# Patient Record
Sex: MALE | Race: WHITE | NOT HISPANIC OR LATINO | Employment: OTHER | URBAN - METROPOLITAN AREA
[De-identification: names, ages, dates, MRNs, and addresses within clinical notes are randomized per-mention and may not be internally consistent; named-entity substitution may affect disease eponyms.]

---

## 2017-05-08 ENCOUNTER — TRANSCRIBE ORDERS (OUTPATIENT)
Dept: ADMINISTRATIVE | Facility: HOSPITAL | Age: 72
End: 2017-05-08

## 2017-05-08 ENCOUNTER — APPOINTMENT (OUTPATIENT)
Dept: LAB | Facility: HOSPITAL | Age: 72
End: 2017-05-08
Payer: MEDICARE

## 2017-05-08 DIAGNOSIS — R73.09 OTHER ABNORMAL GLUCOSE: ICD-10-CM

## 2017-05-08 LAB
EST. AVERAGE GLUCOSE BLD GHB EST-MCNC: 126 MG/DL
HBA1C MFR BLD: 6 % (ref 4.2–6.3)

## 2017-05-08 PROCEDURE — 83036 HEMOGLOBIN GLYCOSYLATED A1C: CPT

## 2017-05-08 PROCEDURE — 36415 COLL VENOUS BLD VENIPUNCTURE: CPT

## 2017-05-09 ENCOUNTER — GENERIC CONVERSION - ENCOUNTER (OUTPATIENT)
Dept: OTHER | Facility: OTHER | Age: 72
End: 2017-05-09

## 2017-05-09 LAB
LEFT EYE DIABETIC RETINOPATHY: NORMAL
RIGHT EYE DIABETIC RETINOPATHY: NORMAL

## 2017-05-19 ENCOUNTER — ALLSCRIPTS OFFICE VISIT (OUTPATIENT)
Dept: OTHER | Facility: OTHER | Age: 72
End: 2017-05-19

## 2017-06-16 ENCOUNTER — GENERIC CONVERSION - ENCOUNTER (OUTPATIENT)
Dept: OTHER | Facility: OTHER | Age: 72
End: 2017-06-16

## 2017-07-03 ENCOUNTER — ALLSCRIPTS OFFICE VISIT (OUTPATIENT)
Dept: OTHER | Facility: OTHER | Age: 72
End: 2017-07-03

## 2017-08-12 DIAGNOSIS — I10 ESSENTIAL (PRIMARY) HYPERTENSION: ICD-10-CM

## 2017-08-12 DIAGNOSIS — E78.5 HYPERLIPIDEMIA: ICD-10-CM

## 2017-08-12 DIAGNOSIS — E55.9 VITAMIN D DEFICIENCY: ICD-10-CM

## 2017-08-12 DIAGNOSIS — R73.09 OTHER ABNORMAL GLUCOSE: ICD-10-CM

## 2017-08-12 DIAGNOSIS — Z12.5 ENCOUNTER FOR SCREENING FOR MALIGNANT NEOPLASM OF PROSTATE: ICD-10-CM

## 2017-11-20 ENCOUNTER — TRANSCRIBE ORDERS (OUTPATIENT)
Dept: ADMINISTRATIVE | Facility: HOSPITAL | Age: 72
End: 2017-11-20

## 2017-11-20 ENCOUNTER — APPOINTMENT (OUTPATIENT)
Dept: LAB | Facility: HOSPITAL | Age: 72
End: 2017-11-20
Payer: MEDICARE

## 2017-11-20 DIAGNOSIS — E78.5 HYPERLIPIDEMIA: ICD-10-CM

## 2017-11-20 DIAGNOSIS — R73.09 OTHER ABNORMAL GLUCOSE: ICD-10-CM

## 2017-11-20 DIAGNOSIS — Z12.5 ENCOUNTER FOR SCREENING FOR MALIGNANT NEOPLASM OF PROSTATE: ICD-10-CM

## 2017-11-20 DIAGNOSIS — I10 ESSENTIAL (PRIMARY) HYPERTENSION: ICD-10-CM

## 2017-11-20 DIAGNOSIS — E55.9 VITAMIN D DEFICIENCY: ICD-10-CM

## 2017-11-20 LAB
25(OH)D3 SERPL-MCNC: 33 NG/ML (ref 30–100)
ALBUMIN SERPL BCP-MCNC: 3.9 G/DL (ref 3.5–5)
ALP SERPL-CCNC: 59 U/L (ref 46–116)
ALT SERPL W P-5'-P-CCNC: 43 U/L (ref 12–78)
ANION GAP SERPL CALCULATED.3IONS-SCNC: 10 MMOL/L (ref 4–13)
AST SERPL W P-5'-P-CCNC: 22 U/L (ref 5–45)
BASOPHILS # BLD AUTO: 0.02 THOUSANDS/ΜL (ref 0–0.1)
BASOPHILS NFR BLD AUTO: 0 % (ref 0–1)
BILIRUB SERPL-MCNC: 0.6 MG/DL (ref 0.2–1)
BILIRUB UR QL STRIP: NEGATIVE
BUN SERPL-MCNC: 20 MG/DL (ref 5–25)
CALCIUM SERPL-MCNC: 9.3 MG/DL (ref 8.3–10.1)
CHLORIDE SERPL-SCNC: 103 MMOL/L (ref 100–108)
CHOLEST SERPL-MCNC: 164 MG/DL (ref 50–200)
CLARITY UR: CLEAR
CO2 SERPL-SCNC: 27 MMOL/L (ref 21–32)
COLOR UR: YELLOW
CREAT SERPL-MCNC: 1.35 MG/DL (ref 0.6–1.3)
CREAT UR-MCNC: 152 MG/DL
EOSINOPHIL # BLD AUTO: 0.15 THOUSAND/ΜL (ref 0–0.61)
EOSINOPHIL NFR BLD AUTO: 2 % (ref 0–6)
ERYTHROCYTE [DISTWIDTH] IN BLOOD BY AUTOMATED COUNT: 12.9 % (ref 11.6–15.1)
EST. AVERAGE GLUCOSE BLD GHB EST-MCNC: 120 MG/DL
GFR SERPL CREATININE-BSD FRML MDRD: 52 ML/MIN/1.73SQ M
GLUCOSE P FAST SERPL-MCNC: 130 MG/DL (ref 65–99)
GLUCOSE UR STRIP-MCNC: NEGATIVE MG/DL
HBA1C MFR BLD: 5.8 % (ref 4.2–6.3)
HCT VFR BLD AUTO: 40.7 % (ref 36.5–49.3)
HDLC SERPL-MCNC: 36 MG/DL (ref 40–60)
HGB BLD-MCNC: 13.8 G/DL (ref 12–17)
HGB UR QL STRIP.AUTO: NEGATIVE
KETONES UR STRIP-MCNC: NEGATIVE MG/DL
LDLC SERPL CALC-MCNC: 89 MG/DL (ref 0–100)
LEUKOCYTE ESTERASE UR QL STRIP: NEGATIVE
LYMPHOCYTES # BLD AUTO: 1.98 THOUSANDS/ΜL (ref 0.6–4.47)
LYMPHOCYTES NFR BLD AUTO: 29 % (ref 14–44)
MCH RBC QN AUTO: 31.6 PG (ref 26.8–34.3)
MCHC RBC AUTO-ENTMCNC: 33.9 G/DL (ref 31.4–37.4)
MCV RBC AUTO: 93 FL (ref 82–98)
MICROALBUMIN UR-MCNC: 6.3 MG/L (ref 0–20)
MICROALBUMIN/CREAT 24H UR: 4 MG/G CREATININE (ref 0–30)
MONOCYTES # BLD AUTO: 0.89 THOUSAND/ΜL (ref 0.17–1.22)
MONOCYTES NFR BLD AUTO: 13 % (ref 4–12)
NEUTROPHILS # BLD AUTO: 3.83 THOUSANDS/ΜL (ref 1.85–7.62)
NEUTS SEG NFR BLD AUTO: 56 % (ref 43–75)
NITRITE UR QL STRIP: NEGATIVE
PH UR STRIP.AUTO: 5.5 [PH] (ref 4.5–8)
PLATELET # BLD AUTO: 278 THOUSANDS/UL (ref 149–390)
PMV BLD AUTO: 9.8 FL (ref 8.9–12.7)
POTASSIUM SERPL-SCNC: 5.2 MMOL/L (ref 3.5–5.3)
PROT SERPL-MCNC: 7.5 G/DL (ref 6.4–8.2)
PROT UR STRIP-MCNC: NEGATIVE MG/DL
PSA SERPL-MCNC: 0.6 NG/ML (ref 0–4)
RBC # BLD AUTO: 4.37 MILLION/UL (ref 3.88–5.62)
SODIUM SERPL-SCNC: 140 MMOL/L (ref 136–145)
SP GR UR STRIP.AUTO: 1.02 (ref 1–1.03)
TRIGL SERPL-MCNC: 196 MG/DL
TSH SERPL DL<=0.05 MIU/L-ACNC: 1.43 UIU/ML (ref 0.36–3.74)
UROBILINOGEN UR QL STRIP.AUTO: 0.2 E.U./DL
WBC # BLD AUTO: 6.87 THOUSAND/UL (ref 4.31–10.16)

## 2017-11-20 PROCEDURE — 82306 VITAMIN D 25 HYDROXY: CPT

## 2017-11-20 PROCEDURE — 85025 COMPLETE CBC W/AUTO DIFF WBC: CPT

## 2017-11-20 PROCEDURE — 81003 URINALYSIS AUTO W/O SCOPE: CPT

## 2017-11-20 PROCEDURE — G0103 PSA SCREENING: HCPCS

## 2017-11-20 PROCEDURE — 83036 HEMOGLOBIN GLYCOSYLATED A1C: CPT

## 2017-11-20 PROCEDURE — 80061 LIPID PANEL: CPT

## 2017-11-20 PROCEDURE — 36415 COLL VENOUS BLD VENIPUNCTURE: CPT

## 2017-11-20 PROCEDURE — 84443 ASSAY THYROID STIM HORMONE: CPT

## 2017-11-20 PROCEDURE — 82043 UR ALBUMIN QUANTITATIVE: CPT

## 2017-11-20 PROCEDURE — 82570 ASSAY OF URINE CREATININE: CPT

## 2017-11-20 PROCEDURE — 80053 COMPREHEN METABOLIC PANEL: CPT

## 2017-11-27 ENCOUNTER — ALLSCRIPTS OFFICE VISIT (OUTPATIENT)
Dept: OTHER | Facility: OTHER | Age: 72
End: 2017-11-27

## 2017-11-27 DIAGNOSIS — I77.811 ABDOMINAL AORTIC ECTASIA (HCC): ICD-10-CM

## 2017-11-28 NOTE — PROGRESS NOTES
Assessment    1  Encounter for preventive health examination (V70 0) (Z00 00)   2  Elevated hemoglobin A1c (790 29) (R73 09)   3  Creatinine elevation (790 99) (R79 89)   4  Ectatic abdominal aorta (447 72) (I77 811)   5  Esophageal reflux (530 81) (K21 9)   6  Hyperlipidemia (272 4) (E78 5)   7  Hypertension (401 9) (I10)   8  Vitamin D deficiency (268 9) (E55 9)    Plan  Creatinine elevation    · (1) COMPREHENSIVE METABOLIC PANEL; Status:Active; Requested for:01Apr2018;   Ectatic abdominal aorta    · US ABDOMINAL AORTA; Status:Hold For - Scheduling; Requested for:27Nov2017;   Elevated hemoglobin A1c    · (1) HEMOGLOBIN A1C; Status:Active; Requested for:01Apr2018; Health Maintenance    · Call [phone] if: You have any warning signs of skin cancer ; Status:Complete;  Done: 14XPB9781   · Seek Immediate Medical Attention if: You experience a new kind of chest pain (angina) orpressure ; Status:Complete;   Done: 63MUE1043   · Eat a low fat and low cholesterol diet ; Status:Complete;   Done: 16TCS1446   · The plan of care for falls is detailed in the plan and/or discussion section of today's note  ;Status:Complete;   Done: 12FXP4770   · There are many exercise options for seniors ; Status:Complete;   Done: 74FTA9349   · There ways to avoid falling ; Status:Complete;   Done: 57ABF9377   · These are things you can do to prevent falls in and around the home ; Status:Complete;  Done: 71NJU7448   · We recommend that you follow the Mediterranean diet ; Status:Complete;   Done:27Nov2017  Insomnia    · TraZODone HCl - 100 MG Oral Tablet;  Take one-half tablet by  mouth to 1 tabletby mouth  as needed for sleep  Elevated creatinine This is stable around 1 3-1 2 Patient is on lisinopril and we will continue the same   Elevated A1 This has improved to 5 8 from 6 0 Always seems to be around the same We will check that again in 6 months  Ectatic aorta It has been 1 year since we have checked this so we will do another ultrasound of his aorta  Trigger finger/tendinitis of his thumb Patient will follow-up with Dr Luisa Mansfield  Soft vaginal reflux Patient is using Zantac/ranitidine and basically is working  For breakthrough as he uses Prilosec   Peripheral neuropathy This is still tolerable without medications  Patient still wants to hold on gabapentin or amitriptyline  Hyperlipidemia Cholesterol values excellent with simvastatin only  Hypertension Blood pressure is good with lisinopril HCTZ    Check in 6 months with A1c microalbumin PSA vitamin D and screening blood work AWV that day  Next colonoscopy will be July 2018 Patient's last EGD was June 2013 Recheck sooner if needed   Chief Complaint  pt  presents in the office today due to a 6 month recheck for his insomina, lipids, and HTN  due - 07/2018due 0 05/2018      History of Present Illness  Patient is here for 6 month recheck  last visit he saw Dr Yari Pepe was splinted his hand for his trigger finger thumb  Has not really helped so he is going backconstant patient is a little bit better and he has cut down on his T from 10 cups a day to about 3 or 4is here today to go over all his blood worknotices that the he ranitidine is helping somewhat with his reflux but not as good is a an episode all   Sometimes he needs to substitute in Prilosec      Review of Systems   Constitutional No fever chills no fatigue no weight loss no weight gain  Mental status No anxiety or depression and no sleep disturbances no changes in personality or emotional problems no suicidal or homicidal ideations  Eyes No eye pain no red eyes no visual disturbances no discharge no eye itch  ENT No earache no hearing loss nasal discharge no sore throat no hoarseness no postnasal drip   Cardio No chest pain no palpitations no leg edema no claudication no dyspnea on exertion no nocturnal dyspnea  Respiratory No shortness of breath or wheeze no cough no orthopnea no dyspnea on exertion no hemoptysis no sputum production  GI No abdominal pain no nausea no vomiting no diarrhea or constipation no bloody stools no change in bowel habits no change in weight   no dysuria hematuria no pyuria no incontinence no pelvic pain  Musculoskeletal No myalgias arthralgias no joint swelling or stiffness no limb pain or swelling  Skin No rashes or lesions no itchiness and no wounds  Neuro No headache dizziness lightheadedness syncope numbness paresthesias or confusion  Heme No swollen glands no easy bruising  Surgical history; surgical history was reviewed and updated today  Family history; family history was reviewed and updated today  Social history; social history was reviewed and updated today  The medication list was reviewed and updated today      Active Problems  1  Actinic keratosis (702 0) (L57 0)   2  Advance directive in chart (V49 89) (Z78 9)   3  Allergic rhinitis (477 9) (J30 9)   4  Colonoscopy (Fiberoptic) Screening   5  Creatinine elevation (790 99) (R79 89)   6  De Quervain's tenosynovitis, right (727 04) (M65 4)   7  Diverticulosis (562 10) (K57 90)   8  Ectatic abdominal aorta (447 72) (I77 811)   9  Elastosis of skin (701 8) (L98 8)   10  Elevated hemoglobin A1c (790 29) (R73 09)   11  Esophageal reflux (530 81) (K21 9)   12  Hiatal hernia (553 3) (K44 9)   13  Hyperlipidemia (272 4) (E78 5)   14  Hypertension (401 9) (I10)   15  Insomnia (780 52) (G47 00)   16  Long term use of drug (V58 69) (Z79 899)   17  Special screening examination for neoplasm of prostate (V76 44) (Z12 5)   18  Tinnitus, unspecified laterality (388 30) (H93 19)   19  Trigger middle finger of right hand (727 03) (M65 331)   20  Vitamin D deficiency (268 9) (E55 9)    Past Medical History  1  History of Abnormal glucose (790 29) (R73 09)   2  History of Anemia (285 9) (D64 9)   3  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)   4  History of Chondromalacia of patella, 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)   5   History of Ganglion (727 43) (M67 40)   6  History of Glaucoma Screening   7  History of Glucose Intolerance (271 3)   8  History of abdominal pain (V13 89) (Z87 898)   9  History of diverticulitis of colon (V12 79) (Z87 19)   10  History of gastroenteritis (V12 79) (Z87 19)   11  History of Hyperesthesia (782 0) (R20 3)   12  History of Joint pain, knee (719 46) (M25 569)   13  History of Rectal hemorrhage (569 3) (K62 5)    Surgical History  1  History of Adenoidectomy   2  History of Incisional Breast Biopsy   3  History of Neck Surgery   4  History of Tonsillectomy    Family History  Mother    1  Family history of Mother  At Age 80  Father    2  Family history of Father  At Age 80    Social History     · Advance directive in chart (V49 89) (Z78 9)   · Always uses seat belt   · Being A Social Drinker   · Copy of advanced directive obtained (V49 89) (Z78 9)   · Drinks caffeinated tea   · Former smoker (V15 82) (T94 452)   · No drug use    Current Meds   1  Aspirin 81 MG TABS; TAKE 1 TABLET DAILY; Therapy: 34YCD3581 to (Evaluate:04Nsx4328); Last Rx:22Fks1953 Ordered   2  Randy Contour Next Test In [employer]; TEST or diabetes, 250, twice a day; Therapy: 02HDO5941 to (Last BS:53JXK2539)  Requested for: 99IBF1209 Ordered   3  Centrum TABS; TAKE 1 TABLET DAILY; Therapy: (Rendall Face) to Recorded   4  Equate Allergy-Sinus Headache TABS; TAKE 6 TABLET Daily; Therapy: (Rendall Face) to Recorded   5  HydroCHLOROthiazide 25 MG Oral Tablet; Take one-half tablet by  mouth daily; Therapy: 22ZFS8388 to (Evaluate:2018)  Requested for: 98LOB1833; Last Rx:28Ven0828 Ordered   6  Lisinopril 40 MG Oral Tablet; Take 1 tablet by mouth  daily; Therapy: 88NWM6180 to (Evaluate:2018)  Requested for: 41OLG0365; Last Rx:87Jry1000 Ordered   7  Microlet Lancets Miscellaneous; Use to test blood sugar for diabetes, 250, twice a day; Therapy: 61YNC2537 to (Last YO:31MJO6666)  Requested for: 64YIE6272 Ordered   8   RaNITidine HCl - 300 MG Oral Tablet; one tablet twice a day; Therapy: 01EBD2435 to (Last Rx:30Jun2017)  Requested for: 30Jun2017 Ordered   9  Simvastatin 5 MG Oral Tablet; TAKE 1 TABLET BY MOUTH AT  BEDTIME; Therapy: 21Haq0497 to (Evaluate:01Jan2018)  Requested for: 81FNS4436; Last Rx:01Frz8347 Ordered   10  TraZODone HCl - 100 MG Oral Tablet; Take one-half tablet by  mouth to 1 tablet by mouth   as needed for sleep; Therapy: 63Ukj6043 to (Evaluate:14Dec2017)  Requested for: 72YAW7836; Last  Rx:02Gco1909 Ordered   11  Vitamin C TABS; TAKE 1 TABLET DAILY; Therapy: (Omar Woodard) to Recorded   12  Vitamin E TABS; TAKE 1 TABLET DAILY; Therapy: (Recorded:26Mar2013) to Recorded    Allergies  1  Iodine SOLN  2  No Known Environmental Allergies   3  No Known Food Allergies    Vitals  Vital Signs    Recorded: 88UPZ9588 08:58AM   Heart Rate 80   Respiration 14   Systolic 708   Diastolic 72   Height 5 ft 5 in   Weight 181 lb    BMI Calculated 30 12   BSA Calculated 1 9       Physical Exam   Gen   No acute distress well-appearing well-nourished appears stated age  Mental status Good judgment and insight oriented to time person and place, recent and remote memory intact mood and affect normal cooperative and patient is reasonable  HEENT PERRLA 3 mm, EOMI without nystagmus, TMs clear, turbinates open pink no exudate, pharynx benign, tongue midline  Neck  supple no masses trachea midline positive click normal carotid upstrokes with no bruits  Cor Regular rhythm without ectopy or murmur, no S3-S4, normal palpation that is no heave lift or thrill  Vascular No edema, good pedal pulses  Lungs CTA bilaterally in no respiratory distress no wheezes rhonchi or rales, normal to palpation no tactile fremitus  Abdomen Soft, no palpable masses, no hepatosplenomegaly, normal bowel sounds, nontender  Lymphatics No palpable nodes in the neck, supraclavicular area, axilla, or groin   Musculoskeletal No clubbing cyanosis or edema muscle tone normal  Skin no rashes or abnormal appearing lesions  Neuro Normal ambulation, cranial nerves 2-12 grossly intact, higher functioning with reasoning intact  Results/Data  (1) LIPID PANEL, FASTING 20Nov2017 09:08AM Viry Beckham    Order Number: [id number]     Test Name Result Flag Reference   CHOLESTEROL 164 mg/dL     HDL,DIRECT 36 mg/dL L 40-60   Specimen collection should occur prior to Metamizole administration due to the potential for falsley depressed results  LDL CHOLESTEROL CALCULATED 89 mg/dL  0-100     Triglyceride:       Normal <150 mg/dl  Borderline High 150-199 mg/dl  High 200-499 mg/dl  Very High >499 mg/dl   Cholesterol:      Desirable <200 mg/dl   Borderline High 200-239 mg/dl   High >239 mg/dl   HDL Cholesterol:      High>59 mg/dL   Low <41 mg/dL   This screening LDL is a calculated result  It does not have the accuracy of the Direct Measured LDL in the monitoring of patients with hyperlipidemia and/or statin therapy  Direct Measure LDL (OKL086) must be ordered separately in these patients  TRIGLYCERIDES 196 mg/dL H <=150   Specimen collection should occur prior to N-Acetylcysteine or Metamizole administration due to the potential for falsely depressed results  (1) CBC/PLT/DIFF 46ZTR8429 09:08AM Viry Beckham    Order Number: [id number]     Test Name Result Flag Reference   WBC COUNT 6 87 Thousand/uL  4 31-10 16   RBC COUNT 4 37 Million/uL  3 88-5 62   HEMOGLOBIN 13 8 g/dL  12 0-17 0   HEMATOCRIT 40 7 %  36 5-49 3   MCV 93 fL  82-98   MCH 31 6 pg  26 8-34 3   MCHC 33 9 g/dL  31 4-37 4   RDW 12 9 %  11 6-15 1   MPV 9 8 fL  8 9-12 7   PLATELET COUNT 754 Thousands/uL  149-390   NEUTROPHILS RELATIVE PERCENT 56 %  43-75   LYMPHOCYTES RELATIVE PERCENT 29 %  14-44   MONOCYTES RELATIVE PERCENT 13 % H 4-12   EOSINOPHILS RELATIVE PERCENT 2 %  0-6   BASOPHILS RELATIVE PERCENT 0 %  0-1   NEUTROPHILS ABSOLUTE COUNT 3 83 Thousands/? ??L  1 85-7 62   LYMPHOCYTES ABSOLUTE COUNT 1 98 Thousands/? ??L  0 60-4 47   MONOCYTES ABSOLUTE COUNT 0 89 Thousand/? ??L  0 17-1 22   EOSINOPHILS ABSOLUTE COUNT 0 15 Thousand/? ??L  0 00-0 61   BASOPHILS ABSOLUTE COUNT 0 02 Thousands/? ??L  0 00-0 10     (1) COMPREHENSIVE METABOLIC PANEL 94KBA5870 33:51TM Leana Trotter   TW Order Number: [id number]     Test Name Result Flag Reference   SODIUM 140 mmol/L  136-145   POTASSIUM 5 2 mmol/L  3 5-5 3   CHLORIDE 103 mmol/L  100-108   CARBON DIOXIDE 27 mmol/L  21-32   ANION GAP (CALC) 10 mmol/L  4-13   BLOOD UREA NITROGEN 20 mg/dL  5-25   CREATININE 1 35 mg/dL H 0 60-1 30   Standardized to IDMS reference method   CALCIUM 9 3 mg/dL  8 3-10 1   BILI, TOTAL 0 60 mg/dL  0 20-1 00   ALK PHOSPHATAS 59 U/L     ALT (SGPT) 43 U/L  12-78   Specimen collection should occur prior to Sulfasalazine administration due to the potential for falsely depressed results  AST(SGOT) 22 U/L  5-45   Specimen collection should occur prior to Sulfasalazine administration due to the potential for falsely depressed results  ALBUMIN 3 9 g/dL  3 5-5 0   TOTAL PROTEIN 7 5 g/dL  6 4-8 2   eGFR 52 ml/min/1 73sq m       [facility] Disease Education Program recommendations are as follows: GFR calculation is accurate only with a steady state creatinine Chronic Kidney disease less than 60 ml/min/1 73 sq  meters Kidney failure less than 15 ml/min/1 73 sq  meters  GLUCOSE FASTING 130 mg/dL H 65-99   Specimen collection should occur prior to Sulfasalazine administration due to the potential for falsely depressed results  Specimen collection should occur prior to Sulfapyridine administration due to the potential for falsely elevated results  (1) TSH WITH FT4 REFLEX 94FWJ6729 09:08AM Leana Trotter    Order Number: [id number]     Test Name Result Flag Reference   TSH 1 430 uIU/mL  0 358-3 740   Patients undergoing fluorescein dye angiography may retain small amounts of fluorescein in the body for 48-72 hours post procedure  Samples containing fluorescein can produce falsely depressed TSH values  If the patient had this procedure,a specimen should be resubmitted post fluorescein clearance  (1) URINALYSIS (will reflex a microscopy if leukocytes, occult blood, protein or nitrites are not within normal limits) 11DAN2589 09:08AM Al[facility] Rosa Isela    Order Number: [id number]     Test Name Result Flag Reference   COLOR Yellow     CLARITY Clear     SPECIFIC GRAVITY UA 1 025  1 003-1 030   PH UA 5 5  4 5-8 0   LEUKOCYTE ESTERASE UA Negative  Negative   NITRITE UA Negative  Negative   PROTEIN UA Negative mg/dl  Negative   GLUCOSE UA Negative mg/dl  Negative   KETONES UA Negative mg/dl  Negative   UROBILINOGEN UA 0 2 E U /dl  0 2, 1 0 E U /dl   BILIRUBIN UA Negative  Negative   BLOOD UA Negative  Negative     (1) VITAMIN D 25-HYDROXY 93WJD5496 09:08AM [facility]    Order Number: [id number]     Test Name Result Flag Reference   VIT D 25-HYDROX 33 0 ng/mL  30 0-100 0     This assay is a certified procedure of the CDC Vitamin D Standardization Certification Program (VDSCP)   Deficiency <20ng/ml  Insufficiency 20-30ng/ml  Sufficient  ng/ml   *Patients undergoing fluorescein dye angiography may retain small amounts of fluorescein in the body for 48-72 hours post procedure  Samples containing fluorescein can produce falsely elevated Vitamin D values  If the patient had this procedure, a specimen should be resubmitted post fluorescein clearance  (1) PSA (SCREEN) (Dx V76 44 Screen for Prostate Cancer) 20Nov2017 09:08AM [facility]    Order Number: [id number]     Test Name Result Flag Reference   PROSTATE SPECIFIC ANTIGEN 0 6 ng/mL  0 0-4 0   American Urological Association Guidelines define biochemical recurrence of prostate cancer as a detectable or rising PSA value post-radical prostatectomy that is greater than or equal to 0 2 ng/mL with a second confirmatory level of greater than or equal to 0 2 ng/mL  (1) HEMOGLOBIN A1C 35STY8446 09:08AM [facility] [place] Order Number: [id number]     Test Name Result Flag Reference   HEMOGLOBIN A1C 5 8 %  4 2-6 3   EST  AVG  GLUCOSE 120 mg/dl       (1) MICROALBUMIN CREATININE RATIO, RANDOM URINE 20Nov2017 09:08AM [facility] [place] Order Number: [id number]     Test Name Result Flag Reference   MICROALBUMIN/ CREAT R 4 mg/g creatinine  0-30   MICROALBUMIN,URINE 6 3 mg/L  0 0-20 0   CREATININE URINE 152 0 mg/dL         Health Management  Colonoscopy (Fiberoptic) Screening   COLONOSCOPY; every 5 years; Last 96QLD4102; Next Due: 17WGH4764; Active  Hypertension   EKG/ECG- POC; every 1 year; Last 35OKX1590; Next Due: 72IAV8351; Active  Health Maintenance   Medicare Annual Wellness Visit; every 1 year; Last 21YCX6955; Next Due: 83ZWP7941;  Overdue    Signatures   Electronically signed by : Ellyn Johansen DO; Nov 27 2017  9:46AM EST                       (Author)

## 2017-12-01 ENCOUNTER — HOSPITAL ENCOUNTER (OUTPATIENT)
Dept: ULTRASOUND IMAGING | Facility: HOSPITAL | Age: 72
Discharge: HOME | End: 2017-12-01
Payer: MEDICARE

## 2017-12-01 DIAGNOSIS — I77.811 ABDOMINAL AORTIC ECTASIA (HCC): ICD-10-CM

## 2017-12-01 PROCEDURE — 76775 US EXAM ABDO BACK WALL LIM: CPT

## 2018-01-11 NOTE — PROGRESS NOTES
Assessment   1  Encounter for preventive health examination (V70 0) (Z00 00)    Plan  Health Maintenance    · Call [phone] if: You have any warning signs of skin cancer ; Status:Complete;    Done: 71VGH9750   · Seek Immediate Medical Attention if: You experience a new kind of chest pain (angina)  or pressure ; Status:Complete;   Done: 60UAP6182   · Eat a low fat and low cholesterol diet ; Status:Complete;   Done: 67IQL4045   · The plan of care for falls is detailed in the plan and/or discussion section of today's note ;  Status:Complete;   Done: 89NDT1704   · There are many exercise options for seniors ; Status:Complete;   Done: 29KTB0201   · There ways to avoid falling ; Status:Complete;   Done: 20HYK7501   · These are things you can do to prevent falls in and around the home ; Status:Complete;    Done: 40RLR0237   · We recommend that you follow the "Mediterranean diet "; Status:Complete;   Done:  86FMY5499    Patient is here for his annual Medicare recheck review the following is a summary    #1 his colonoscopy is due July 2018  #2 patient will get another ultrasound of his aorta since his last one was ectatic at 2 3 cm      Discussion/Summary    Patient's AAA is negative  He will be informed1        Impression: Subsequent Annual Wellness Visit, with preventive exam as well as age and risk appropriate counseling completed  Cardiovascular screening and counseling: screening is current  Diabetes screening and counseling: screening is current  Colorectal cancer screening and counseling: screening is current  Prostate cancer screening and counseling: screening is current  Osteoporosis screening and counseling: screening not indicated  Abdominal aortic aneurysm screening and counseling: screening is current  HIV screening and counseling: screening not indicated  Hepatitis C Screening: the patient was counseled on Hepatitis C screening  The patient declinesHepatitis C screening   Immunizations: influenza vaccine is up to date this year, influenza vaccination is recommended annually, the lifetime pneumococcal vaccine has been completed, hepatitis B vaccination series is not indicated at this time due to the patient's low risk of scott the disease, Zostavax vaccination up to date and Tdap vaccination up to date  Advance Directive Planning: complete and up to date  Advice and education were given regarding fall risk reduction  (Basement railing) (None needed) Medical Equipment/Suppliers: None Needed  Patient Discussion: plan discussed with the patient, follow-up visit needed in one year  1 Amended By: Falguni Chambers; Dec 01 2017 3:11 PM EST    Chief Complaint  Pt here for an AWV appt  History of Present Illness  Welcome to Medicare and Wellness Visits: The patient is being seen for the subsequent annual wellness visit  Medicare Screening and Risk Factors   Hospitalizations: no previous hospitalizations  Once per lifetime medicare screening tests: ECG ~U(), AAA screening US ~U() and Slightly ectatic  Medicare Screening Tests Risk Questions   Abdominal aortic aneurysm risk assessment: over 72years of age  Osteoporosis risk assessment: none indicated  HIV risk assessment: none indicated  Drug and Alcohol Use: The patient is a former cigarette smoker and Patient stop when he was 28years old  The patient reports occasional alcohol use and drinking 2 drinks per week  Alcohol concern:   The patient has no concerns about alcohol abuse  He has never used illicit drugs  Diet and Physical Activity: Current diet includes well balanced meals and limited junk food  He exercises 2 times per week  Exercise: walking, stretching, strength training 15-60 minutes per day  Mood Disorder and Cognitive Impairment Screening: He denies feeling down, depressed, or hopeless over the past two weeks  He denies feeling little interest or pleasure in doing things over the past two weeks     Cognitive impairment screening: denies difficulty learning/retaining new information, denies difficulty handling complex tasks, denies difficulty with reasoning, denies difficulty with spatial ability and orientation, denies difficulty with language and denies difficulty with behavior  Advance Directives: Advance directives: living will  end of life decisions were reviewed with the patient and I agree with the patient's decisions  Co-Managers and Medical Equipment/Suppliers: See Patient Care Team       Reviewed Updated Tarun Valentine:   Last Medicare Wellness Visit Information was reviewed, patient interviewed and updates made to the record today  Patient Care Team    Care Team Member Role Specialty Office Number   Hansa Ho   [phone]     Active Problems   1  Actinic keratosis (702 0) (L57 0)  2  Advance directive in chart (V49 89) (Z78 9)  3  Allergic rhinitis (477 9) (J30 9)  4  Colonoscopy (Fiberoptic) Screening  5  Creatinine elevation (790 99) (R79 89)  6  De Quervain's tenosynovitis, right (727 04) (M65 4)  7  Diverticulosis (562 10) (K57 90)  8  Ectatic abdominal aorta (447 72) (I77 811)  9  Elastosis of skin (701 8) (L98 8)  10  Elevated hemoglobin A1c (790 29) (R73 09)  11  Encounter for screening for cardiovascular disorders (V81 2) (Z13 6)  12  Encounter for therapeutic drug monitoring (V58 83) (Z51 81)  13  Esophageal reflux (530 81) (K21 9)  14  Hiatal hernia (553 3) (K44 9)  15  Hyperlipidemia (272 4) (E78 5)  16  Hypertension (401 9) (I10)  17  Insomnia (780 52) (G47 00)  18  Long term use of drug (V58 69) (Z79 899)  19  Need for immunization against influenza (V04 81) (Z23)  20  Need for prophylactic vaccination and inoculation against influenza (V04 81) (Z23)  21  Special screening examination for neoplasm of prostate (V76 44) (Z12 5)  22  Tinnitus, unspecified laterality (388 30) (H93 19)  23  Trigger middle finger of right hand (727 03) (M65 331)  24   Vitamin D deficiency (268 9) (E55 9)    Past Medical History    · History of Abnormal glucose (790 29) (R73 09)   · History of Anemia (285 9) (D64 9)   · 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)   · History of Chondromalacia of patella, 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)   · History of Ganglion (727 43) (M67 40)   · History of Glaucoma Screening   · History of Glucose Intolerance (271 3)   · History of abdominal pain (V13 89) (W74 175)   · History of diverticulitis of colon (V12 79) (Z87 19)   · History of gastroenteritis (V12 79) (Z87 19)   · History of Hyperesthesia (782 0) (R20 3)   · History of Joint pain, knee (719 46) (M25 569)   · Need for prophylactic vaccination and inoculation against influenza (V04 81) (Z23)   · History of Rectal hemorrhage (569 3) (K62 5)    Surgical History    · History of Adenoidectomy   · History of Incisional Breast Biopsy   · History of Neck Surgery   · History of Tonsillectomy    Family History  Mother    · Family history of Mother  At Age 80  Father    · Family history of Father  At Age 80    Social History    · Advance directive in chart (V49 89) (Z78 9)   · Always uses seat belt   · Being A Social Drinker   · Copy of advanced directive obtained (V49 89) (Z78 9)   · Drinks caffeinated tea   · Drinks 4-6 cups of hot tea a day   · Former smoker (V15 82) (K03 458)   · quit in 0523-0616   · No drug use    Current Meds  1  Aspirin 81 MG TABS; TAKE 1 TABLET DAILY; Therapy: 26MWK2700 to (Evaluate:49Zka1332); Last Rx:30Ivz7565 Ordered  2  Randy Contour Next Test In [employer]; TEST or diabetes, 250, twice a day; Therapy: 71JZR8045 to (Last TV:88BER5922)  Requested for: 59JVY5401 Ordered  3  Centrum TABS; TAKE 1 TABLET DAILY; Therapy: (Nori Livingston) to Recorded  4  Equate Allergy-Sinus Headache TABS; TAKE 6 TABLET Daily; Therapy: (Nori Livingston) to Recorded  5  HydroCHLOROthiazide 25 MG Oral Tablet; Take one-half tablet by  mouth daily;    Therapy: 22SRY3882 to (Evaluate:01Jan2018)  Requested for: 34MIQ7391; Last   Rx:05Hqk2498 Ordered  6  Lisinopril 40 MG Oral Tablet; Take 1 tablet by mouth  daily; Therapy: 03WXI4851 to (Evaluate:01Jan2018)  Requested for: 73AFE4215; Last   Rx:17Lta6722 Ordered  7  Loratadine 10 MG Oral Tablet; TAKE 1 TABLET DAILY AS NEEDED; Therapy: (Recorded:40Zkd3378) to Recorded  8  Microlet Lancets Miscellaneous; Use to test blood sugar for diabetes, 250, twice a day; Therapy: 58VQW5533 to (Last MCGEE:94OMN2171)  Requested for: 95EHV1756 Ordered  9  RaNITidine HCl - 300 MG Oral Tablet; one tablet twice a day; Therapy: 93WNB0742 to (Last Rx:30Jun2017)  Requested for: 30Jun2017 Ordered  10  Simvastatin 5 MG Oral Tablet; TAKE 1 TABLET BY MOUTH AT  BEDTIME; Therapy: 53Trg4070 to (Evaluate:01Jan2018)  Requested for: 52PJS1566; Last    Rx:72Lnz5657 Ordered  11  TraZODone HCl - 100 MG Oral Tablet; Take one-half tablet by  mouth to 1 tablet by    mouth  as needed for sleep; Therapy: 16Utm3934 to (Evaluate:03Gtu3402)  Requested for: 14SWL9974; Last    Rx:56Hjv9576 Ordered  12  Vitamin C TABS; TAKE 1 TABLET DAILY; Therapy: (Yana Jasso) to Recorded  13  Vitamin E TABS; TAKE 1 TABLET DAILY; Therapy: (Recorded:26Mar2013) to Recorded    Allergies   1  Iodine SOLN   2  No Known Environmental Allergies  3  No Known Food Allergies    Immunizations   ** Printed in Appendix #1 below  Vitals  Signs    Heart Rate: 80  Respiration: 14  Systolic: 220  Diastolic: 72  Height: 5 ft 5 in  Weight: 181 lb   BMI Calculated: 30 12  BSA Calculated: 1 9    Results/Data   ABDOMINAL AORTA1 [id number] 07:52AM1 [facility] TW Order Number: [id number]   Performing Comments: This is a 1 year recheck of patient's ectatic aorta   - Patient Instructions: To schedule this appointment, please contact Central Scheduling at [phone]       Test Name Result Flag Reference   [address] (Report)1     ABDOMINAL AORTIC ULTRASOUND     INDICATION: Evaluate for aortic aneurysm  COMPARISON: 2016     FINDINGS:      Ultrasound of the abdominal aorta was performed in longitudinal and transverse planes with a curvilinear transducer  Proximal aorta: 2 7 x 2 5 cm   Mid aorta:    Not well seen due to overlying bowel gas  Distal aorta:  1 7 x 1 7 cm   Right common iliac origin: 1 2 x 1 1 cm   Left common iliac origin: 1 2 x 0 9 cm     No periaortic collections or adenopathy detected  IMPRESSION:     No evidence for abdominal aortic aneurysm  Workstation performed: [id number]     Signed by:   Jose Chan MD   17         1  Amended By: Pamela Cabrera; Dec 01 2017 3:11 PM EST Health Management  Colonoscopy (Fiberoptic) Screening   COLONOSCOPY; every 5 years; Last 15NRJ3756; Next Due: 82QRA5429; Active  Hypertension   EKG/ECG- POC; every 1 year; Last 45IYU1374; Next Due: 34PXI6936; Active  Health Maintenance   Medicare Annual Wellness Visit; every 1 year; Last 79YZY1426; Next Due: 25AQJ1690;   Overdue    Signatures   Electronically signed by : Rick Delgado DO; Dec  1 2017  3:11PM EST                       (Author)    Appendix #1     Patient: Stephanie Hubbard ; : 1945; MRN: [id number]      1 2 3 4 5 6    DTP/DTaP  28-Aug-2006  (60y)         HIB  28-Aug-2006  (60y)         Influenza  2011  (65y) 21-Sep-2012  (66y) 06-Sep-2013  (67y) 15-Sep-2014  (40K) 28-Sep-2015  (69y) Oct 2016  (70y)    Lyme  2001  (55y)         PCV  2016  (70y)         PPSV  2007  (62y) 2012  (96W)        Polio  28-Aug-2006  (60y)         Tdap  2003  (57y)         Zoster  27-Sep-2012  (66y)

## 2018-01-12 VITALS
RESPIRATION RATE: 16 BRPM | BODY MASS INDEX: 31 KG/M2 | HEART RATE: 84 BPM | DIASTOLIC BLOOD PRESSURE: 68 MMHG | HEIGHT: 65 IN | SYSTOLIC BLOOD PRESSURE: 122 MMHG | WEIGHT: 186.1 LBS

## 2018-01-13 VITALS
HEIGHT: 65 IN | SYSTOLIC BLOOD PRESSURE: 110 MMHG | BODY MASS INDEX: 30.16 KG/M2 | WEIGHT: 181 LBS | DIASTOLIC BLOOD PRESSURE: 72 MMHG | RESPIRATION RATE: 14 BRPM | HEART RATE: 80 BPM

## 2018-01-13 VITALS
WEIGHT: 186.5 LBS | DIASTOLIC BLOOD PRESSURE: 61 MMHG | HEIGHT: 65 IN | HEART RATE: 82 BPM | SYSTOLIC BLOOD PRESSURE: 94 MMHG | BODY MASS INDEX: 31.07 KG/M2

## 2018-01-13 NOTE — MISCELLANEOUS
Message   Recorded as Task   Date: 06/15/2017 12:06 PM, Created By: Yandel Hoskins   Task Name: Medical Complaint Callback   Assigned To: Nitesh Sommer   Regarding Patient: Андрей Fitzpatrick, Status: Active   CommentHollace Rias - 15 Salty 2017 12:06 PM     TASK CREATED  Byard called and said that he is now up to 2 pills of the Famotidine every day and it still isn't helping with his acid reflux  Wants to know what to do  [phone] and uses Subhash Pin - 15 Salty 2017 5:32 PM     TASK REPLIED TO: Previously Assigned To Nitesh Sommer  New medication called Zantac/ranitidine 300 mg twice a day as been called to his local pharmacy  Have him try this in place of the famotidine and see how he does  He should give it at least 2 weeks   Tatiana Mcdowell - 16 Jun 2017 7:33 AM     TASK REPLIED TO: Previously Assigned To Tatiana Mcdowell  That's great, I'll call him but I had written that he wanted the RX to go to [facility] for 90 days even though it's a new RX  [id number] - 16 Jun 2017 10:29 AM     TASK REPLIED TO: Previously Assigned To Nitesh Sommer  No problem, I sent it to [facility]  The only problem is, if it doesn't work he will have a ton of medicine laying around   Yandel Hoskins - 16 Jun 2017 12:15 PM     TASK REPLIED TO: Previously Assigned To [facility] the info          Signatures   Electronically signed by : Aftab Rice DO; Jun 16 2017 12:22PM EST                       (Author)

## 2018-02-07 DIAGNOSIS — K20.90 ESOPHAGITIS: Primary | ICD-10-CM

## 2018-02-07 PROBLEM — M65.4 DE QUERVAIN'S TENOSYNOVITIS, RIGHT: Status: ACTIVE | Noted: 2017-07-03

## 2018-02-07 PROBLEM — M65.331 TRIGGER MIDDLE FINGER OF RIGHT HAND: Status: ACTIVE | Noted: 2017-05-19

## 2018-02-07 RX ORDER — LANCETS
EACH MISCELLANEOUS
COMMUNITY
Start: 2014-08-04 | End: 2018-07-10 | Stop reason: SDUPTHER

## 2018-02-07 RX ORDER — RIBOFLAVIN (VITAMIN B2) 100 MG
1 TABLET ORAL DAILY
COMMUNITY

## 2018-02-07 RX ORDER — TRAZODONE HYDROCHLORIDE 100 MG/1
TABLET ORAL
COMMUNITY
Start: 2017-11-26 | End: 2019-01-03 | Stop reason: SDUPTHER

## 2018-02-07 RX ORDER — LISINOPRIL 40 MG/1
1 TABLET ORAL DAILY
COMMUNITY
Start: 2016-12-19 | End: 2018-06-19 | Stop reason: SDUPTHER

## 2018-02-07 RX ORDER — RANITIDINE 300 MG/1
1 TABLET ORAL 2 TIMES DAILY
COMMUNITY
Start: 2017-06-20 | End: 2018-05-29 | Stop reason: ALTCHOICE

## 2018-02-07 RX ORDER — HYDROCHLOROTHIAZIDE 25 MG/1
0.5 TABLET ORAL DAILY
COMMUNITY
Start: 2016-12-19 | End: 2018-06-19 | Stop reason: SDUPTHER

## 2018-02-07 RX ORDER — ASPIRIN 81 MG/1
1 TABLET, CHEWABLE ORAL DAILY
COMMUNITY
Start: 2015-08-18

## 2018-02-07 RX ORDER — SIMVASTATIN 5 MG
1 TABLET ORAL
COMMUNITY
Start: 2014-09-15 | End: 2018-06-19 | Stop reason: SDUPTHER

## 2018-02-07 RX ORDER — MULTIVITAMIN/IRON/FOLIC ACID 18MG-0.4MG
1 TABLET ORAL DAILY
COMMUNITY

## 2018-02-07 RX ORDER — CYANOCOBALAMIN (VITAMIN B-12) 500 MCG
1 LOZENGE ORAL DAILY
COMMUNITY
End: 2019-06-11 | Stop reason: ALTCHOICE

## 2018-02-07 RX ORDER — OMEPRAZOLE 40 MG/1
CAPSULE, DELAYED RELEASE ORAL
Qty: 90 CAPSULE | Refills: 3 | Status: SHIPPED | OUTPATIENT
Start: 2018-02-07 | End: 2019-01-03 | Stop reason: SDUPTHER

## 2018-04-01 DIAGNOSIS — R73.09 OTHER ABNORMAL GLUCOSE: ICD-10-CM

## 2018-04-01 DIAGNOSIS — R79.89 OTHER SPECIFIED ABNORMAL FINDINGS OF BLOOD CHEMISTRY: ICD-10-CM

## 2018-05-16 ENCOUNTER — OFFICE VISIT (OUTPATIENT)
Dept: URGENT CARE | Facility: CLINIC | Age: 73
End: 2018-05-16
Payer: MEDICARE

## 2018-05-16 VITALS
OXYGEN SATURATION: 96 % | SYSTOLIC BLOOD PRESSURE: 114 MMHG | WEIGHT: 188 LBS | BODY MASS INDEX: 31.32 KG/M2 | DIASTOLIC BLOOD PRESSURE: 72 MMHG | RESPIRATION RATE: 16 BRPM | HEIGHT: 65 IN | HEART RATE: 75 BPM | TEMPERATURE: 97.8 F

## 2018-05-16 DIAGNOSIS — H61.23 BILATERAL IMPACTED CERUMEN: Primary | ICD-10-CM

## 2018-05-16 PROCEDURE — 99213 OFFICE O/P EST LOW 20 MIN: CPT | Performed by: PHYSICIAN ASSISTANT

## 2018-05-16 PROCEDURE — G0463 HOSPITAL OUTPT CLINIC VISIT: HCPCS | Performed by: PHYSICIAN ASSISTANT

## 2018-05-16 NOTE — PATIENT INSTRUCTIONS
Ear lavage attempted in both ears- successful in the right ear- unable to move cerumen in the left ear     Follow up with ENT for cerumen removal in the right ear  Take OTC ibuprofen for discomfort

## 2018-05-16 NOTE — PROGRESS NOTES
NAME: Deloris Angeles is a 67 y o  male  : 1945    MRN: [id number]      Assessment and Plan   Bilateral impacted cerumen [H61 23]  1  Bilateral impacted cerumen  Ear cerumen removal       Ear lavage performed in clinic  Successful removal of cerumen in right ear  unsuccessful removal from left ear after several attempts  Patient Instructions   Patient Instructions   Ear lavage attempted in both ears- successful in the right ear- unable to move cerumen in the left ear  Follow up with ENT for cerumen removal in the right ear  Take OTC ibuprofen for discomfort      Proceed to ER if symptoms worsen  History of Present Illness     Patient presents complaining of ear fullness and pain in both ears  He states he got new hearing aids which go into the canal and when they were placed his audiologist said he had a lot of wax  He denies fevers, chills, congestion, sore throat or cough  He reports decreased hearing in both ears but L>R  He attempted to wash out his ears but had no success  Review of Systems   Review of Systems   Constitutional: Negative for chills and fever  HENT: Positive for ear pain  Respiratory: Negative for cough            Current Medications       Current Outpatient Prescriptions:     Ascorbic Acid (VITAMIN C) 100 MG tablet, Take 1 tablet by mouth daily, Disp: , Rfl:     aspirin 81 mg chewable tablet, Chew 1 tablet daily, Disp: , Rfl:     glucose blood (WESLY CONTOUR NEXT TEST) test strip, by In Vitro route, Disp: , Rfl:     hydrochlorothiazide (HYDRODIURIL) 25 mg tablet, Take 0 5 tablets by mouth daily, Disp: , Rfl:     lisinopril (ZESTRIL) 40 mg tablet, Take 1 tablet by mouth daily, Disp: , Rfl:     MICROLET LANCETS MISC, by Does not apply route, Disp: , Rfl:     multivitamin-minerals (CENTRUM ADULTS) tablet, Take 1 tablet by mouth daily, Disp: , Rfl:     omeprazole (PriLOSEC) 40 MG capsule, TAKE 1 CAPSULE BY MOUTH  DAILY, Disp: 90 capsule, Rfl: 3    ranitidine (ZANTAC) 300 MG tablet, Take 1 tablet by mouth 2 (two) times a day, Disp: , Rfl:     simvastatin (ZOCOR) 5 MG tablet, Take 1 tablet by mouth, Disp: , Rfl:     traZODone (DESYREL) 100 mg tablet, , Disp: , Rfl:     Vitamin E 400 units TABS, Take 1 tablet by mouth daily, Disp: , Rfl:     Diphenhydramine-PSE-APAP 12 5- MG TABS, Take 6 tablets by mouth daily, Disp: , Rfl:     Current Allergies     Allergies as of 05/16/2018 - Reviewed 05/16/2018   Allergen Reaction Noted    Iodine  04/09/2012              Past Medical History:   Diagnosis Date    Abnormal glucose     last assessed 63Lui7363    Anemia     last assessed 07Sjk2611    Benign neoplasm of descending colon 12/01/2009    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    unspecified laterality    Diverticulitis of colon 02/22/2011    Ganglion     last assessed 32CUI6381    Gastroenteritis     last assessed 28RQL4165    Glucose intolerance     last assessed 65Gjg6167    Hyperesthesia 11/30/2010    Rectal hemorrhage 06/01/2010       Past Surgical History:   Procedure Laterality Date    ADENOIDECTOMY  01/01/1950 1950    INCISIONAL BREAST BIOPSY Left 01/01/1980    L breast did bx 1980    NECK SURGERY  01/01/1950    neck gland removed as child [address]   01/01/1950 1950       Family History   Problem Relation Age of Onset    Alzheimer's disease Mother     Heart attack Father          Medications have been verified  The following portions of the patient's history were reviewed and updated as appropriate: allergies, current medications, past family history, past medical history, past social history, past surgical history and problem list     Objective   /72   Pulse 75   Temp 97 8 °F (36 6 °C)   Resp 16   Ht 5' 5" (1 651 m)   Wt 85 3 kg (188 lb)   SpO2 96%   BMI 31 28 kg/m²      Physical Exam     Physical Exam   Constitutional: He appears well-developed and well-nourished  No distress     HENT:   TMs not able to be visualized due to cerumen impaction b/l  Canals without erythema       Right TM after cerumen removal- TM clear without bulging, erythema or perforation

## 2018-05-21 ENCOUNTER — APPOINTMENT (OUTPATIENT)
Dept: LAB | Facility: HOSPITAL | Age: 73
End: 2018-05-21
Payer: MEDICARE

## 2018-05-21 DIAGNOSIS — R79.89 OTHER SPECIFIED ABNORMAL FINDINGS OF BLOOD CHEMISTRY: ICD-10-CM

## 2018-05-21 DIAGNOSIS — R73.09 OTHER ABNORMAL GLUCOSE: ICD-10-CM

## 2018-05-21 LAB
ALBUMIN SERPL BCP-MCNC: 3.8 G/DL (ref 3.5–5)
ALP SERPL-CCNC: 66 U/L (ref 46–116)
ALT SERPL W P-5'-P-CCNC: 30 U/L (ref 12–78)
ANION GAP SERPL CALCULATED.3IONS-SCNC: 8 MMOL/L (ref 4–13)
AST SERPL W P-5'-P-CCNC: 21 U/L (ref 5–45)
BILIRUB SERPL-MCNC: 0.5 MG/DL (ref 0.2–1)
BUN SERPL-MCNC: 23 MG/DL (ref 5–25)
CALCIUM SERPL-MCNC: 9.1 MG/DL (ref 8.3–10.1)
CHLORIDE SERPL-SCNC: 104 MMOL/L (ref 100–108)
CO2 SERPL-SCNC: 26 MMOL/L (ref 21–32)
CREAT SERPL-MCNC: 1.22 MG/DL (ref 0.6–1.3)
EST. AVERAGE GLUCOSE BLD GHB EST-MCNC: 126 MG/DL
GFR SERPL CREATININE-BSD FRML MDRD: 59 ML/MIN/1.73SQ M
GLUCOSE SERPL-MCNC: 156 MG/DL (ref 65–140)
HBA1C MFR BLD: 6 % (ref 4.2–6.3)
POTASSIUM SERPL-SCNC: 3.9 MMOL/L (ref 3.5–5.3)
PROT SERPL-MCNC: 7.5 G/DL (ref 6.4–8.2)
SODIUM SERPL-SCNC: 138 MMOL/L (ref 136–145)

## 2018-05-21 PROCEDURE — 80053 COMPREHEN METABOLIC PANEL: CPT

## 2018-05-21 PROCEDURE — 36415 COLL VENOUS BLD VENIPUNCTURE: CPT

## 2018-05-21 PROCEDURE — 83036 HEMOGLOBIN GLYCOSYLATED A1C: CPT

## 2018-05-24 NOTE — PROGRESS NOTES
ASSESSMENT/PLAN:    Elevated creatinine   Patient is on lisinopril and we will continue the same  Creatinine is stable at 1 22 usually between 1 2 and 1 35    Elevated A1   A1c 6 0 from 5 8 and stable  Always seems to be around the same   We will check that again in 6 months      Ectatic aorta  This year's CAT scan says he has no aortic aneurysm so we will stop following this  Esophageal reflux   Patient is using Zantac/ranitidine and basically is working  For breakthrough as he uses Prilosec       Peripheral neuropathy   This is still tolerable without medications  Patient still wants to hold on gabapentin or amitriptyline      Hyperlipidemia   Cholesterol values excellent with simvastatin only      Hypertension   Blood pressure is good with lisinopril HCTZ          Check in 6 months with A1c microalbumin PSA vitamin D and screening blood work   AWV that day    Next colonoscopy will be July 2018 Patient's last EGD was June 2013    Order given for colonoscopy   Recheck sooner if needed      Health Maintenance   Topic Date Due    Hepatitis C Screening  1945    SLP PLAN OF CARE  1945    GLAUCOMA SCREENING 67+ YR  02/17/2016    DTaP,Tdap,and Td Vaccines (3 - Td) 08/28/2016    Depression Screening PHQ-9  05/19/2018    COLONOSCOPY  07/16/2018    INFLUENZA VACCINE  09/01/2018    Annual Wellnes Visit (AWV)  11/27/2018    Fall Risk  05/29/2019    ABDOMINAL AORTIC ANEURYSM (AAA) SCREEN  Completed    PNEUMOCOCCAL POLYSACCHARIDE VACCINE AGE 72 AND OVER  Completed         Problem List as of 5/29/2018 Reviewed: 5/16/2018 11:46 AM by Oliver Allan PA-C    Actinic keratosis    Allergic rhinitis    Colon cancer screening    Creatinine elevation    De Quervain's tenosynovitis, right    Diverticulosis    Ectatic abdominal aorta (HCC)    Elastosis of skin    Elevated hemoglobin A1c    Esophageal reflux    Hiatal hernia    Hyperlipidemia    Hypertension    Insomnia    Tinnitus    Trigger middle finger of right hand    Vitamin D deficiency            Subjective:   Chief Complaint   Patient presents with    Follow-up     HTN, elevated A1C, reflux, lipids and Vit D def     Patient is here for 6 month recheck  His only complaint is sometimes he has pain in his legs when he goes to bed but does not keep him from falling asleep  Sometimes he is sore because he has worked too much and sometimes because he has not done anything at all  Overall he feels well and is compliant with his medications  Since last visit he had his ultrasound of his aorta done, he had blood work done as well         patient ID: Elsie Ann is a 67 y o  male      Past Medical History:   Diagnosis Date    Abnormal glucose     last assessed [id number]    Anemia     last assessed [id number]    Benign neoplasm of descending colon 12/01/2009    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    unspecified laterality    Diverticulitis of colon 02/22/2011    Ganglion     last assessed [id number]    Gastroenteritis     last assessed [id number]    Glucose intolerance     last assessed [id number]    Hyperesthesia 11/30/2010    Rectal hemorrhage 06/01/2010     Past Surgical History:   Procedure Laterality Date    ADENOIDECTOMY  01/01/1950 1950    INCISIONAL BREAST BIOPSY Left 01/01/1980    L breast did bx 1980    NECK SURGERY  01/01/1950    neck gland removed as child [address]   01/01/1950 1950     Family History   Problem Relation Age of Onset    Alzheimer's disease Mother     Heart attack Father      Social History   Substance Use Topics    Smoking status: Former Smoker     Quit date: 1980    Smokeless tobacco: Never Used      Comment: quit in [id number]    Alcohol use Yes      Comment: social     History   Smoking Status    Former Smoker    Quit date: 1980   Smokeless Tobacco    Never Used     Comment: quit in [id number]        MED LIST WAS REVIEWED AND UPDATED       ROS    Constitutional  No fever chills no fatigue no weight loss no weight gain    Mental status  No anxiety or depression and no sleep disturbances no changes in personality or emotional problems no suicidal or homicidal ideations    Eyes  No eye pain no red eyes no visual disturbances no discharge no eye itch    ENT  No earache no hearing loss nasal discharge no sore throat no hoarseness no postnasal drip     Cardio  No chest pain no palpitations no leg edema no claudication no dyspnea on exertion no nocturnal dyspnea    Respiratory  No shortness of breath or wheeze no cough no orthopnea no dyspnea on exertion no hemoptysis no sputum production    GI  No abdominal pain no nausea no vomiting no diarrhea or constipation no bloody stools no change in bowel habits no change in weight      no dysuria hematuria no pyuria no incontinence no pelvic pain    Musculoskeletal  No myalgias arthralgias no joint swelling or stiffness no limb pain or swelling    Skin  No rashes or lesions no itchiness and no wounds    Neuro  No headache dizziness lightheadedness syncope numbness paresthesias or confusion    Heme  No swollen glands no easy bruising          Objective:      VITALS:  Wt Readings from Last 3 Encounters:   05/29/18 83 3 kg (183 lb 11 2 oz)   05/16/18 85 3 kg (188 lb)   11/27/17 82 1 kg (181 lb)     BP Readings from Last 3 Encounters:   05/29/18 122/78   05/16/18 114/72   11/27/17 110/72     Pulse Readings from Last 3 Encounters:   05/29/18 72   05/16/18 75   11/27/17 80     Body mass index is 30 34 kg/m²      Laboratory Results:   Lab Results   Component Value Date    WBC 6 87 11/20/2017    WBC 8 12 08/11/2016    WBC 7 24 08/10/2015    HGB 13 8 11/20/2017    HGB 13 4 08/11/2016    HGB 14 0 08/10/2015    HCT 40 7 11/20/2017    HCT 39 5 08/11/2016    HCT 40 2 08/10/2015    MCV 93 11/20/2017    MCV 92 08/11/2016    MCV 91 08/10/2015     11/20/2017     08/11/2016     08/10/2015     Lab Results   Component Value Date     05/21/2018     11/20/2017    NA 142 11/07/2016    K 3 9 05/21/2018    K 5 2 11/20/2017    K 4 3 11/07/2016     05/21/2018     11/20/2017     11/07/2016    CO2 26 05/21/2018    CO2 27 11/20/2017    CO2 29 11/07/2016    BUN 23 05/21/2018    BUN 20 11/20/2017    BUN 19 11/07/2016     Lab Results   Component Value Date    GLUCOSE 156 (H) 05/21/2018    ALT 30 05/21/2018    AST 21 05/21/2018    ALKPHOS 66 05/21/2018    EGFR 59 05/21/2018    CALCIUM 9 1 05/21/2018     No results found for: TSH    Lipid Profile:   Lab Results   Component Value Date    CHOL 164 11/20/2017    CHOL 136 08/11/2016    CHOL 125 08/10/2015     Lab Results   Component Value Date    HDL 36 (L) 11/20/2017    HDL 34 (L) 08/11/2016    HDL 36 08/10/2015     Lab Results   Component Value Date    LDLCALC 89 11/20/2017    LDLCALC 60 08/11/2016    LDLCALC 64 08/10/2015     Lab Results   Component Value Date    TRIG 196 (H) 11/20/2017    TRIG 211 (H) 08/11/2016    TRIG 127 08/10/2015       Diabetic labs (if applicable)  Lab Results   Component Value Date    HGBA1C 6 0 05/21/2018    HGBA1C 5 8 11/20/2017    HGBA1C 6 0 05/08/2017     Lab Results   Component Value Date    GLUF 130 (H) 11/20/2017    LDLCALC 89 11/20/2017    CREATININE 1 22 05/21/2018          Physical Exam  Constitutional  Appears healthy, Looks well, Appearance consistent with age    Mental Status  Alert, Oriented, Cooperative, Memory function normal , clean, and reasonable    Neck  No neck mass, No thyromegaly, Good carotid upstrokes bilaterally, trachea midline positive click    Respiratory  Breath sounds normal, No rales, No rhonchi, No wheezing, normal palpation    Cardiac   Regular rhythm without ectopy or murmur no S3-S4, no heave lift or thrill to palpation    Vascular  No leg edema, No pedal edema    Muscular skeletal  No clubbing cyanosis , muscle tone normal    Skin  No appreciable rashes or abnormal appearing lesions

## 2018-05-29 ENCOUNTER — OFFICE VISIT (OUTPATIENT)
Dept: FAMILY MEDICINE CLINIC | Facility: CLINIC | Age: 73
End: 2018-05-29
Payer: MEDICARE

## 2018-05-29 VITALS
SYSTOLIC BLOOD PRESSURE: 122 MMHG | HEIGHT: 65 IN | DIASTOLIC BLOOD PRESSURE: 78 MMHG | BODY MASS INDEX: 30.6 KG/M2 | RESPIRATION RATE: 16 BRPM | HEART RATE: 72 BPM | WEIGHT: 183.7 LBS

## 2018-05-29 DIAGNOSIS — R79.89 CREATININE ELEVATION: ICD-10-CM

## 2018-05-29 DIAGNOSIS — E55.9 VITAMIN D DEFICIENCY: ICD-10-CM

## 2018-05-29 DIAGNOSIS — K21.9 GASTROESOPHAGEAL REFLUX DISEASE, ESOPHAGITIS PRESENCE NOT SPECIFIED: ICD-10-CM

## 2018-05-29 DIAGNOSIS — J30.9 ALLERGIC RHINITIS, UNSPECIFIED SEASONALITY, UNSPECIFIED TRIGGER: ICD-10-CM

## 2018-05-29 DIAGNOSIS — I10 HYPERTENSION, UNSPECIFIED TYPE: Primary | ICD-10-CM

## 2018-05-29 DIAGNOSIS — R73.09 ELEVATED HEMOGLOBIN A1C: ICD-10-CM

## 2018-05-29 DIAGNOSIS — E78.2 MIXED HYPERLIPIDEMIA: ICD-10-CM

## 2018-05-29 DIAGNOSIS — Z12.11 COLON CANCER SCREENING: ICD-10-CM

## 2018-05-29 DIAGNOSIS — Z12.11 ENCOUNTER FOR SCREENING COLONOSCOPY: ICD-10-CM

## 2018-05-29 DIAGNOSIS — I77.811 ECTATIC ABDOMINAL AORTA (HCC): ICD-10-CM

## 2018-05-29 DIAGNOSIS — G47.00 INSOMNIA, UNSPECIFIED TYPE: ICD-10-CM

## 2018-05-29 DIAGNOSIS — Z12.5 PROSTATE CANCER SCREENING: ICD-10-CM

## 2018-05-29 PROCEDURE — 93000 ELECTROCARDIOGRAM COMPLETE: CPT | Performed by: FAMILY MEDICINE

## 2018-05-29 PROCEDURE — 99214 OFFICE O/P EST MOD 30 MIN: CPT | Performed by: FAMILY MEDICINE

## 2018-05-29 NOTE — PATIENT INSTRUCTIONS
Today I saw you for your recheck in the following is her list of things to do:    1  He will get a colonoscopy around June so call GI today and make that arrangement  Phone numbers on the paper I gave you  2  Make sure he get blood work about a week before next office visit  3    All your medications are the same

## 2018-06-19 DIAGNOSIS — I10 ESSENTIAL HYPERTENSION: Primary | ICD-10-CM

## 2018-06-19 DIAGNOSIS — E78.2 MIXED HYPERLIPIDEMIA: ICD-10-CM

## 2018-06-19 RX ORDER — LISINOPRIL 40 MG/1
TABLET ORAL DAILY
Qty: 90 TABLET | Refills: 1 | Status: SHIPPED | OUTPATIENT
Start: 2018-06-19 | End: 2019-01-03 | Stop reason: SDUPTHER

## 2018-06-19 RX ORDER — HYDROCHLOROTHIAZIDE 25 MG/1
TABLET ORAL
Qty: 45 TABLET | Refills: 1 | Status: SHIPPED | OUTPATIENT
Start: 2018-06-19 | End: 2019-01-03 | Stop reason: SDUPTHER

## 2018-06-19 RX ORDER — SIMVASTATIN 5 MG
TABLET ORAL
Qty: 90 TABLET | Refills: 1 | Status: SHIPPED | OUTPATIENT
Start: 2018-06-19 | End: 2019-01-03 | Stop reason: SDUPTHER

## 2018-07-10 ENCOUNTER — TELEPHONE (OUTPATIENT)
Dept: FAMILY MEDICINE CLINIC | Facility: CLINIC | Age: 73
End: 2018-07-10

## 2018-07-10 DIAGNOSIS — R73.9 HYPERGLYCEMIA: Primary | ICD-10-CM

## 2018-07-10 RX ORDER — LANCETS
EACH MISCELLANEOUS
Qty: 100 EACH | Refills: 3 | Status: SHIPPED | OUTPATIENT
Start: 2018-07-10 | End: 2019-09-20 | Stop reason: SDUPTHER

## 2018-07-24 NOTE — TELEPHONE ENCOUNTER
Patient called  He still cannot get his Lancets  I called his Pharmacy  The [facility] is faxing over a DWO form to be filled out (Medicare form)  I will keep a look out for it on [employer]

## 2018-09-21 ENCOUNTER — CLINICAL SUPPORT (OUTPATIENT)
Dept: FAMILY MEDICINE CLINIC | Facility: CLINIC | Age: 73
End: 2018-09-21
Payer: MEDICARE

## 2018-09-21 VITALS — WEIGHT: 183.6 LBS | BODY MASS INDEX: 30.32 KG/M2

## 2018-09-21 DIAGNOSIS — Z23 NEED FOR PROPHYLACTIC VACCINATION AND INOCULATION AGAINST INFLUENZA: Primary | ICD-10-CM

## 2018-09-21 PROCEDURE — 90662 IIV NO PRSV INCREASED AG IM: CPT

## 2018-09-21 PROCEDURE — G0008 ADMIN INFLUENZA VIRUS VAC: HCPCS

## 2018-11-19 ENCOUNTER — TRANSCRIBE ORDERS (OUTPATIENT)
Dept: ADMINISTRATIVE | Facility: HOSPITAL | Age: 73
End: 2018-11-19

## 2018-11-19 ENCOUNTER — APPOINTMENT (OUTPATIENT)
Dept: LAB | Facility: HOSPITAL | Age: 73
End: 2018-11-19
Payer: MEDICARE

## 2018-11-19 DIAGNOSIS — E55.9 VITAMIN D DEFICIENCY: ICD-10-CM

## 2018-11-19 DIAGNOSIS — R73.09 ELEVATED HEMOGLOBIN A1C: ICD-10-CM

## 2018-11-19 DIAGNOSIS — E78.2 MIXED HYPERLIPIDEMIA: Primary | ICD-10-CM

## 2018-11-19 DIAGNOSIS — Z12.5 PROSTATE CANCER SCREENING: ICD-10-CM

## 2018-11-19 DIAGNOSIS — I10 HYPERTENSION, UNSPECIFIED TYPE: ICD-10-CM

## 2018-11-19 DIAGNOSIS — E78.2 MIXED HYPERLIPIDEMIA: ICD-10-CM

## 2018-11-19 LAB
25(OH)D3 SERPL-MCNC: 36 NG/ML (ref 30–100)
ALBUMIN SERPL BCP-MCNC: 3.9 G/DL (ref 3.5–5)
ALP SERPL-CCNC: 62 U/L (ref 46–116)
ALT SERPL W P-5'-P-CCNC: 33 U/L (ref 12–78)
ANION GAP SERPL CALCULATED.3IONS-SCNC: 11 MMOL/L (ref 4–13)
AST SERPL W P-5'-P-CCNC: 19 U/L (ref 5–45)
BACTERIA UR QL AUTO: ABNORMAL /HPF
BASOPHILS # BLD AUTO: 0.04 THOUSANDS/ΜL (ref 0–0.1)
BASOPHILS NFR BLD AUTO: 1 % (ref 0–1)
BILIRUB SERPL-MCNC: 0.5 MG/DL (ref 0.2–1)
BILIRUB UR QL STRIP: NEGATIVE
BUN SERPL-MCNC: 20 MG/DL (ref 5–25)
CALCIUM SERPL-MCNC: 9.2 MG/DL (ref 8.3–10.1)
CHLORIDE SERPL-SCNC: 103 MMOL/L (ref 100–108)
CHOLEST SERPL-MCNC: 156 MG/DL (ref 50–200)
CLARITY UR: CLEAR
CO2 SERPL-SCNC: 26 MMOL/L (ref 21–32)
COLOR UR: YELLOW
CREAT SERPL-MCNC: 1.39 MG/DL (ref 0.6–1.3)
CREAT UR-MCNC: 290 MG/DL
EOSINOPHIL # BLD AUTO: 0.28 THOUSAND/ΜL (ref 0–0.61)
EOSINOPHIL NFR BLD AUTO: 4 % (ref 0–6)
ERYTHROCYTE [DISTWIDTH] IN BLOOD BY AUTOMATED COUNT: 12.7 % (ref 11.6–15.1)
GFR SERPL CREATININE-BSD FRML MDRD: 50 ML/MIN/1.73SQ M
GLUCOSE P FAST SERPL-MCNC: 136 MG/DL (ref 65–99)
GLUCOSE UR STRIP-MCNC: NEGATIVE MG/DL
HCT VFR BLD AUTO: 38.8 % (ref 36.5–49.3)
HDLC SERPL-MCNC: 32 MG/DL (ref 40–60)
HGB BLD-MCNC: 12.9 G/DL (ref 12–17)
HGB UR QL STRIP.AUTO: NEGATIVE
IMM GRANULOCYTES # BLD AUTO: 0.01 THOUSAND/UL (ref 0–0.2)
IMM GRANULOCYTES NFR BLD AUTO: 0 % (ref 0–2)
KETONES UR STRIP-MCNC: NEGATIVE MG/DL
LDLC SERPL CALC-MCNC: 84 MG/DL (ref 0–100)
LEUKOCYTE ESTERASE UR QL STRIP: NEGATIVE
LYMPHOCYTES # BLD AUTO: 1.82 THOUSANDS/ΜL (ref 0.6–4.47)
LYMPHOCYTES NFR BLD AUTO: 26 % (ref 14–44)
MCH RBC QN AUTO: 31.4 PG (ref 26.8–34.3)
MCHC RBC AUTO-ENTMCNC: 33.2 G/DL (ref 31.4–37.4)
MCV RBC AUTO: 94 FL (ref 82–98)
MICROALBUMIN UR-MCNC: 22 MG/L (ref 0–20)
MICROALBUMIN/CREAT 24H UR: 8 MG/G CREATININE (ref 0–30)
MONOCYTES # BLD AUTO: 0.85 THOUSAND/ΜL (ref 0.17–1.22)
MONOCYTES NFR BLD AUTO: 12 % (ref 4–12)
MUCOUS THREADS UR QL AUTO: ABNORMAL
NEUTROPHILS # BLD AUTO: 3.91 THOUSANDS/ΜL (ref 1.85–7.62)
NEUTS SEG NFR BLD AUTO: 57 % (ref 43–75)
NITRITE UR QL STRIP: NEGATIVE
NON-SQ EPI CELLS URNS QL MICRO: ABNORMAL /HPF
NONHDLC SERPL-MCNC: 124 MG/DL
NRBC BLD AUTO-RTO: 0 /100 WBCS
PH UR STRIP.AUTO: 5.5 [PH] (ref 4.5–8)
PLATELET # BLD AUTO: 264 THOUSANDS/UL (ref 149–390)
PMV BLD AUTO: 10.4 FL (ref 8.9–12.7)
POTASSIUM SERPL-SCNC: 4.2 MMOL/L (ref 3.5–5.3)
PROT SERPL-MCNC: 7.6 G/DL (ref 6.4–8.2)
PROT UR STRIP-MCNC: NEGATIVE MG/DL
PSA SERPL-MCNC: 0.7 NG/ML (ref 0–4)
RBC # BLD AUTO: 4.11 MILLION/UL (ref 3.88–5.62)
RBC #/AREA URNS AUTO: ABNORMAL /HPF
SODIUM SERPL-SCNC: 140 MMOL/L (ref 136–145)
SP GR UR STRIP.AUTO: >=1.03 (ref 1–1.03)
TRIGL SERPL-MCNC: 202 MG/DL
TSH SERPL DL<=0.05 MIU/L-ACNC: 1.22 UIU/ML (ref 0.36–3.74)
UROBILINOGEN UR QL STRIP.AUTO: 0.2 E.U./DL
WBC # BLD AUTO: 6.91 THOUSAND/UL (ref 4.31–10.16)
WBC #/AREA URNS AUTO: ABNORMAL /HPF

## 2018-11-19 PROCEDURE — G0103 PSA SCREENING: HCPCS

## 2018-11-19 PROCEDURE — 81001 URINALYSIS AUTO W/SCOPE: CPT | Performed by: FAMILY MEDICINE

## 2018-11-19 PROCEDURE — 80053 COMPREHEN METABOLIC PANEL: CPT

## 2018-11-19 PROCEDURE — 82570 ASSAY OF URINE CREATININE: CPT | Performed by: FAMILY MEDICINE

## 2018-11-19 PROCEDURE — 83036 HEMOGLOBIN GLYCOSYLATED A1C: CPT

## 2018-11-19 PROCEDURE — 84443 ASSAY THYROID STIM HORMONE: CPT

## 2018-11-19 PROCEDURE — 82306 VITAMIN D 25 HYDROXY: CPT

## 2018-11-19 PROCEDURE — 36415 COLL VENOUS BLD VENIPUNCTURE: CPT

## 2018-11-19 PROCEDURE — 85025 COMPLETE CBC W/AUTO DIFF WBC: CPT

## 2018-11-19 PROCEDURE — 82043 UR ALBUMIN QUANTITATIVE: CPT | Performed by: FAMILY MEDICINE

## 2018-11-19 PROCEDURE — 80061 LIPID PANEL: CPT

## 2018-11-20 LAB
EST. AVERAGE GLUCOSE BLD GHB EST-MCNC: 148 MG/DL
HBA1C MFR BLD: 6.8 % (ref 4.2–6.3)

## 2019-01-03 DIAGNOSIS — G47.00 INSOMNIA, UNSPECIFIED TYPE: Primary | ICD-10-CM

## 2019-01-03 DIAGNOSIS — I10 ESSENTIAL HYPERTENSION: ICD-10-CM

## 2019-01-03 DIAGNOSIS — E78.2 MIXED HYPERLIPIDEMIA: ICD-10-CM

## 2019-01-03 DIAGNOSIS — K20.90 ESOPHAGITIS: ICD-10-CM

## 2019-01-03 RX ORDER — TRAZODONE HYDROCHLORIDE 100 MG/1
TABLET ORAL
Qty: 90 TABLET | Refills: 1 | Status: SHIPPED | OUTPATIENT
Start: 2019-01-03 | End: 2019-07-29 | Stop reason: SDUPTHER

## 2019-01-03 RX ORDER — LISINOPRIL 40 MG/1
TABLET ORAL DAILY
Qty: 90 TABLET | Refills: 1 | Status: SHIPPED | OUTPATIENT
Start: 2019-01-03 | End: 2019-07-29 | Stop reason: SDUPTHER

## 2019-01-03 RX ORDER — SIMVASTATIN 5 MG
TABLET ORAL
Qty: 90 TABLET | Refills: 1 | Status: SHIPPED | OUTPATIENT
Start: 2019-01-03 | End: 2019-07-29 | Stop reason: SDUPTHER

## 2019-01-03 RX ORDER — HYDROCHLOROTHIAZIDE 25 MG/1
TABLET ORAL
Qty: 45 TABLET | Refills: 1 | Status: SHIPPED | OUTPATIENT
Start: 2019-01-03 | End: 2019-07-29 | Stop reason: SDUPTHER

## 2019-01-03 RX ORDER — OMEPRAZOLE 40 MG/1
CAPSULE, DELAYED RELEASE ORAL
Qty: 90 CAPSULE | Refills: 1 | Status: SHIPPED | OUTPATIENT
Start: 2019-01-03 | End: 2019-07-29 | Stop reason: SDUPTHER

## 2019-01-29 LAB
LEFT EYE DIABETIC RETINOPATHY: NORMAL
RIGHT EYE DIABETIC RETINOPATHY: NORMAL

## 2019-02-24 NOTE — PROGRESS NOTES
ASSESSMENT/PLAN:    Type 2 diabetes  Patient's diabetes is now diagnosable because his A1c was 6 8 November, were checking an office A1c today  We will go back to holding in on his diet cutting down bread pasta sweets and candies  We will check him in 3 months with A1c prior    Diabetic nephropathy  We are starting to see very early signs of kidney disease as results of his diabetes  For the 1st time he has protein in his urine  He is on lisinopril for this  Hopefully if his A1c comes down this will also improve    Elevated creatinine  Today was 1 39, was 1 22, and prior 1 35  Now we understand this is clearly from the diabetes  Again we will keep this under control to try to stop any further kidney damage     Ectatic aorta  This year's CAT scan says he has no aortic aneurysm so we will stop following this        Esophageal reflux   Patient is using Zantac/ranitidine and basically is working  For breakthrough as he uses omeprazole      Peripheral neuropathy   This is still tolerable without medications     Patient still wants to hold on gabapentin or amitriptyline       Hyperlipidemia   Cholesterol values excellent with simvastatin only       Hypertension   Blood pressure is good with lisinopril HCTZ           Recheck in 3 months  A1c and a urine for protein prior  Recheck sooner if needed         Health Maintenance   Topic Date Due    Hepatitis C Screening  1945    SLP PLAN OF CARE  1945    BMI: Followup Plan  10/08/1963    DTaP,Tdap,and Td Vaccines (3 - Td) 08/28/2016    CRC Screening: Colonoscopy  07/16/2018    Medicare Annual Wellness Visit (AWV)  11/27/2018    Fall Risk  03/01/2020    Depression Screening PHQ  03/01/2020    BMI: Adult  03/01/2020    INFLUENZA VACCINE  Completed    Pneumococcal PPSV23/PCV13 65+ Years / High and Highest Risk  Completed    HEPATITIS B VACCINES  Aged Out         Problem List as of 3/1/2019 Reviewed: 5/29/2018  9:03 AM by DO Lebron Nava keratosis    Allergic rhinitis    Colon cancer screening    Creatinine elevation    De Quervain's tenosynovitis, right    Diverticulosis    Ectatic abdominal aorta (HCC)    Elastosis of skin    Elevated hemoglobin A1c    Esophageal reflux    Hiatal hernia    Hyperlipidemia    Hypertension    Insomnia    Tinnitus    Trigger middle finger of right hand    Vitamin D deficiency            Subjective:   No chief complaint on file  Patient is here for his recheck on his blood pressure as well as his labs  Overall he feels well without any complaints  He is taking his medications as directed        patient ID: Paddy Burleson is a 68 y o  male      Past Medical History:   Diagnosis Date    Abnormal glucose     last assessed 2012    Anemia     last assessed [id number]    Benign neoplasm of descending colon 2009    Chondromalacia of patella 2011    unspecified laterality    Diverticulitis of colon 2011    Ganglion     last assessed [id number]    Gastroenteritis     last assessed [id number]    Glucose intolerance     last assessed [id number]    Hyperesthesia 2010    Rectal hemorrhage 2010     Past Surgical History:   Procedure Laterality Date    ADENOIDECTOMY  1950    INCISIONAL BREAST BIOPSY Left 1980    L breast did bx     NECK SURGERY  1950    neck gland removed as child [address]   1950     Family History   Problem Relation Age of Onset    Alzheimer's disease Mother     Heart attack Father     Alcohol abuse Neg Hx     Substance Abuse Neg Hx      Social History     Tobacco Use    Smoking status: Former Smoker     Last attempt to quit:      Years since quittin 1    Smokeless tobacco: Never Used    Tobacco comment: quit in [id number]   Substance Use Topics    Alcohol use: Yes     Comment: social    Drug use: No     Social History     Tobacco Use   Smoking Status Former Smoker    Last attempt to quit: Laisha Garzon 39 Years since quittin 1   Smokeless Tobacco Never Used   Tobacco Comment    quit in [id number]        MED LIST WAS REVIEWED AND UPDATED       ROS    Constitutional  No fever chills no fatigue no weight loss no weight gain    Mental status  No anxiety or depression and no sleep disturbances no changes in personality or emotional problems no suicidal or homicidal ideations    Eyes  No eye pain no red eyes no visual disturbances no discharge no eye itch    ENT  No earache no hearing loss nasal discharge no sore throat no hoarseness no postnasal drip     Cardio  No chest pain no palpitations no leg edema no claudication no dyspnea on exertion no nocturnal dyspnea    Respiratory  No shortness of breath or wheeze no cough no orthopnea no dyspnea on exertion no hemoptysis no sputum production    GI  No abdominal pain no nausea no vomiting no diarrhea or constipation no bloody stools no change in bowel habits no change in weight      no dysuria hematuria no pyuria no incontinence no pelvic pain    Musculoskeletal  No myalgias arthralgias no joint swelling or stiffness no limb pain or swelling    Skin  No rashes or lesions no itchiness and no wounds    Neuro  No headache dizziness lightheadedness syncope numbness paresthesias or confusion    Heme  No swollen glands no easy bruising          Objective:      VITALS:  Wt Readings from Last 3 Encounters:   19 84 4 kg (186 lb)   18 83 3 kg (183 lb 9 6 oz)   18 83 3 kg (183 lb 11 2 oz)     BP Readings from Last 3 Encounters:   19 116/60   18 122/78   18 114/72     Pulse Readings from Last 3 Encounters:   19 68   18 72   18 75     Body mass index is 30 72 kg/m²      Laboratory Results:   Lab Results   Component Value Date    WBC 6 91 2018    WBC 6 87 2017    WBC 8 12 2016    HGB 12 9 2018    HGB 13 8 2017    HGB 13 4 2016    HCT 38 8 2018    HCT 40 7 2017    HCT 39 5 2016 MCV 94 11/19/2018    MCV 93 11/20/2017    MCV 92 08/11/2016     11/19/2018     11/20/2017     08/11/2016     Lab Results   Component Value Date     08/10/2015     07/02/2014    K 4 2 11/19/2018    K 3 9 05/21/2018    K 5 2 11/20/2017     11/19/2018     05/21/2018     11/20/2017    CO2 26 11/19/2018    CO2 26 05/21/2018    CO2 27 11/20/2017    BUN 20 11/19/2018    BUN 23 05/21/2018    BUN 20 11/20/2017     Lab Results   Component Value Date    GLUCOSE 132 08/10/2015    ALT 33 11/19/2018    AST 19 11/19/2018    ALKPHOS 62 11/19/2018    EGFR 50 11/19/2018    CALCIUM 9 2 11/19/2018     No results found for: TSH    Lipid Profile:   Lab Results   Component Value Date    CHOL 125 08/10/2015    CHOL 152 02/10/2015    CHOL 185 07/02/2014     Lab Results   Component Value Date    HDL 32 (L) 11/19/2018    HDL 36 (L) 11/20/2017    HDL 34 (L) 08/11/2016     Lab Results   Component Value Date    LDLCALC 84 11/19/2018    LDLCALC 89 11/20/2017    LDLCALC 60 08/11/2016     Lab Results   Component Value Date    TRIG 202 (H) 11/19/2018    TRIG 196 (H) 11/20/2017    TRIG 211 (H) 08/11/2016       Diabetic labs (if applicable)  Lab Results   Component Value Date    HGBA1C 6 8 (H) 11/19/2018    HGBA1C 6 0 05/21/2018    HGBA1C 5 8 11/20/2017     Lab Results   Component Value Date    GLUF 136 (H) 11/19/2018    LDLCALC 84 11/19/2018    CREATININE 1 39 (H) 11/19/2018          Physical Exam  Constitutional  Appears healthy, Looks well, Appearance consistent with age    Mental Status  Alert, Oriented, Cooperative, Memory function normal , clean, and reasonable    Neck  No neck mass, No thyromegaly, Good carotid upstrokes bilaterally, trachea midline positive click    Respiratory  Breath sounds normal, No rales, No rhonchi, No wheezing, normal palpation    Cardiac   Regular rhythm without ectopy or murmur no S3-S4, no heave lift or thrill to palpation    Vascular  No leg edema, No pedal edema    Muscular skeletal  No clubbing cyanosis , muscle tone normal    Skin  No appreciable rashes or abnormal appearing lesions

## 2019-03-01 ENCOUNTER — OFFICE VISIT (OUTPATIENT)
Dept: FAMILY MEDICINE CLINIC | Facility: CLINIC | Age: 74
End: 2019-03-01
Payer: MEDICARE

## 2019-03-01 VITALS
WEIGHT: 186 LBS | BODY MASS INDEX: 30.99 KG/M2 | RESPIRATION RATE: 16 BRPM | SYSTOLIC BLOOD PRESSURE: 116 MMHG | DIASTOLIC BLOOD PRESSURE: 60 MMHG | HEART RATE: 68 BPM | HEIGHT: 65 IN

## 2019-03-01 DIAGNOSIS — I10 ESSENTIAL HYPERTENSION: ICD-10-CM

## 2019-03-01 DIAGNOSIS — E78.2 MIXED HYPERLIPIDEMIA: ICD-10-CM

## 2019-03-01 DIAGNOSIS — K21.9 GASTROESOPHAGEAL REFLUX DISEASE WITHOUT ESOPHAGITIS: ICD-10-CM

## 2019-03-01 DIAGNOSIS — I77.811 ECTATIC ABDOMINAL AORTA (HCC): ICD-10-CM

## 2019-03-01 DIAGNOSIS — E11.29 TYPE 2 DIABETES MELLITUS WITH MICROALBUMINURIA, WITHOUT LONG-TERM CURRENT USE OF INSULIN (HCC): ICD-10-CM

## 2019-03-01 DIAGNOSIS — R73.09 ELEVATED HEMOGLOBIN A1C: ICD-10-CM

## 2019-03-01 DIAGNOSIS — R80.9 TYPE 2 DIABETES MELLITUS WITH MICROALBUMINURIA, WITHOUT LONG-TERM CURRENT USE OF INSULIN (HCC): ICD-10-CM

## 2019-03-01 DIAGNOSIS — E55.9 VITAMIN D DEFICIENCY: ICD-10-CM

## 2019-03-01 DIAGNOSIS — Z00.00 MEDICARE ANNUAL WELLNESS VISIT, SUBSEQUENT: Primary | ICD-10-CM

## 2019-03-01 DIAGNOSIS — R79.89 CREATININE ELEVATION: ICD-10-CM

## 2019-03-01 LAB — SL AMB POCT HEMOGLOBIN AIC: 6.2 (ref ?–6.5)

## 2019-03-01 PROCEDURE — 83036 HEMOGLOBIN GLYCOSYLATED A1C: CPT | Performed by: FAMILY MEDICINE

## 2019-03-01 PROCEDURE — G0439 PPPS, SUBSEQ VISIT: HCPCS | Performed by: FAMILY MEDICINE

## 2019-03-01 PROCEDURE — 99214 OFFICE O/P EST MOD 30 MIN: CPT | Performed by: FAMILY MEDICINE

## 2019-03-01 RX ORDER — IBUPROFEN 800 MG
TABLET ORAL
COMMUNITY

## 2019-03-01 NOTE — PATIENT INSTRUCTIONS
Type 2 diabetes  Patient's diabetes is now diagnosable because his A1c was 6 8 November, were checking an office A1c today  We will go back to holding in on his diet cutting down bread pasta sweets and candies  We will check him in 3 months with A1c prior    Diabetic nephropathy  We are starting to see very early signs of kidney disease as results of his diabetes  For the 1st time he has protein in his urine  He is on lisinopril for this  Hopefully if his A1c comes down this will also improve    Elevated creatinine  Today was 1 39, was 1 22, and prior 1 35  Now we understand this is clearly from the diabetes  Again we will keep this under control to try to stop any further kidney damage     Ectatic aorta  This year's CAT scan says he has no aortic aneurysm so we will stop following this        Esophageal reflux   Patient is using Zantac/ranitidine and basically is working  For breakthrough as he uses omeprazole      Peripheral neuropathy   This is still tolerable without medications     Patient still wants to hold on gabapentin or amitriptyline       Hyperlipidemia   Cholesterol values excellent with simvastatin only       Hypertension   Blood pressure is good with lisinopril HCTZ           Recheck in 3 months  A1c and a urine for protein prior  Recheck sooner if needed

## 2019-03-01 NOTE — PROGRESS NOTES
Assessment and Plan:    Patient will go to the pharmacy for Shingrix  Next colonoscopy July 2023    Problem List Items Addressed This Visit     None        Health Maintenance Due   Topic Date Due    Hepatitis C Screening  1945    SLP PLAN OF CARE  1945    BMI: Followup Plan  10/08/1963    DTaP,Tdap,and Td Vaccines (3 - Td) 08/28/2016    CRC Screening: Colonoscopy  07/16/2018    Medicare Annual Wellness Visit (AWV)  11/27/2018         HPI:  Sharon Eaton is a 68 y o  male here for his Subsequent Wellness Visit      Patient Active Problem List   Diagnosis    Actinic keratosis    Allergic rhinitis    Colon cancer screening    Creatinine elevation    De Quervain's tenosynovitis, right    Diverticulosis    Ectatic abdominal aorta (HCC)    Elastosis of skin    Elevated hemoglobin A1c    Esophageal reflux    Hiatal hernia    Hyperlipidemia    Hypertension    Tinnitus    Insomnia    Trigger middle finger of right hand    Vitamin D deficiency     Past Medical History:   Diagnosis Date    Abnormal glucose     last assessed [id number]    Anemia     last assessed [id number]    Benign neoplasm of descending colon 12/01/2009    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    unspecified laterality    Diverticulitis of colon 02/22/2011    Ganglion     last assessed [id number]    Gastroenteritis     last assessed [id number]    Glucose intolerance     last assessed [id number]    Hyperesthesia 11/30/2010    Rectal hemorrhage 06/01/2010     Past Surgical History:   Procedure Laterality Date    ADENOIDECTOMY  01/01/1950 1950    INCISIONAL BREAST BIOPSY Left 01/01/1980    L breast did bx 1980    NECK SURGERY  01/01/1950    neck gland removed as child [id number]  01/01/1950 1950     Family History   Problem Relation Age of Onset    Alzheimer's disease Mother     Heart attack Father      Social History     Tobacco Use   Smoking Status Former Smoker    Last attempt to quit: Lavonne Welsh Years since quittin 1   Smokeless Tobacco Never Used   Tobacco Comment    quit in [id number]     Social History     Substance and Sexual Activity   Alcohol Use Yes    Comment: social      Social History     Substance and Sexual Activity   Drug Use No       Current Outpatient Medications   Medication Sig Dispense Refill    Ascorbic Acid (VITAMIN C) 100 MG tablet Take 1 tablet by mouth daily      aspirin 81 mg chewable tablet Chew 1 tablet daily      glucose blood (WESLY CONTOUR NEXT TEST) test strip by In Vitro route      hydrochlorothiazide (HYDRODIURIL) 25 mg tablet TAKE ONE-HALF TABLET BY  MOUTH DAILY 45 tablet 1    lisinopril (ZESTRIL) 40 mg tablet TAKE 1 TABLET BY MOUTH  DAILY 90 tablet 1    MICROLET LANCETS MISC Test sugars twice a day 100 each 3    multivitamin-minerals (CENTRUM ADULTS) tablet Take 1 tablet by mouth daily      omeprazole (PriLOSEC) 40 MG capsule TAKE 1 CAPSULE BY MOUTH  DAILY 90 capsule 1    simvastatin (ZOCOR) 5 MG tablet TAKE 1 TABLET BY MOUTH AT  BEDTIME 90 tablet 1    traZODone (DESYREL) 100 mg tablet TAKE ONE-HALF TABLET BY  MOUTH TO 1 TABLET BY MOUTH  AS NEEDED FOR SLEEP 90 tablet 1    Vitamin E 400 units TABS Take 1 tablet by mouth daily       No current facility-administered medications for this visit  Allergies   Allergen Reactions    Iodine      Other reaction(s): was told by nurse: heat rash?      Immunization History   Administered Date(s) Administered    DTaP / HiB / IPV 2006    INFLUENZA 2016, 10/01/2016, 2017    Influenza Split High Dose Preservative Free IM 2013, 09/15/2014, 2015, 10/01/2016, 2017    Influenza TIV (IM) 2011, 2012    Influenza, high dose seasonal 0 5 mL 2018    Lyme Disease 2001    Pneumococcal Conjugate 13-Valent 2016    Pneumococcal Polysaccharide PPV23 2007, 2012    Tdap 2003    Zoster 2012       Patient Care Team:  Deedee Yanez DO as PCP - General    Medicare Screening Tests and Risk Assessments:  Gamaliel Corral is here for his Subsequent Wellness visit  Health Risk Assessment:  Patient rates overall health as very good  Patient feels that their physical health rating is Same  Eyesight was rated as Same  Hearing was rated as Same  Patient feels that their emotional and mental health rating is Much better  Pain experienced by patient in the last 7 days has been None  Patient states that he has experienced no weight loss or gain in last 6 months  Emotional/Mental Health:  Patient has been feeling nervous/anxious  PHQ-9 Depression Screening:    Frequency of the following problems over the past two weeks:      1  Little interest or pleasure in doing things: 0 - not at all      2  Feeling down, depressed, or hopeless: 0 - not at all  PHQ-2 Score: 0          Broken Bones/Falls: Fall Risk Assessment:    In the past year, patient has experienced: No history of falling in past year          Bladder/Bowel:  Patient has not leaked urine accidently in the last six months  Patient reports no loss of bowel control  Immunizations:  Patient has had a flu vaccination within the last year  Patient has received a pneumonia shot  Patient has received a shingles shot  Patient has received tetanus/diphtheria shot  Date of tetanus/diphtheria shot: 2/11/2003    Home Safety:  Patient does not have trouble with stairs inside or outside of their home  Patient currently reports that there are no safety hazards present in home, working smoke alarms, working carbon monoxide detectors  Preventative Screenings:   prostate cancer screen performed, colon cancer screen completed, cholesterol screen completed, glaucoma eye exam completed,     Nutrition:  Current diet: Regular and Limited junk food with servings of the following:    Medications:  Patient is currently taking over-the-counter supplements       List of OTC medications includes: Vitamins and minerals  Patient is able to manage medications  Lifestyle Choices:  Patient reports no tobacco use  Patient has smoked or used tobacco in the past   Patient has stopped his tobacco use  Tobacco use quit date: Quit 30+ years ago  Patient reports alcohol use  Alcohol use per week: 1-2  Patient drives a vehicle  Patient wears seat belt  Current level of exercise of physical activity described by patient as: Walking and strengthening  Activities of Daily Living:  Can get out of bed by his or her self, able to dress self, able to make own meals, able to do own shopping, able to bathe self, can do own laundry/housekeeping, can manage own money, pay bills and track expenses    Previous Hospitalizations:  No hospitalization or ED visit in past 12 months        Advanced Directives:  Patient has decided on a power of   Patient has spoken to designated power of   Patient has completed advanced directive  Preventative Screening/Counseling:      Cardiovascular:      General: Screening Current          Diabetes:      General: Screening Current          Colorectal Cancer:      General: Screening Current          Prostate Cancer:      General: Screening Current          Osteoporosis:      General: Screening Not Indicated          AAA:      General: Screening Current          Glaucoma:      General: Screening Not Indicated          HIV:      General: Screening Not Indicated          Hepatitis C:      General: Screening Not Indicated        Advanced Directives:   Patient has living will for healthcare,     Immunizations:      Influenza: Risks & Benefits Discussed      Pneumococcal: Lifetime Vaccine Completed      Shingrix: Risks & Benefits Discussed      Hepatitis B (Low risk patients): Series Not Indicated      Zostavax: Zostavax Vaccine UTD      TD: Risks & Benefits Discussed      TDAP: Risks & Benefits Discussed      Other Preventative Counseling (Non-Medicare):   Fall Prevention and Increase physical activity

## 2019-03-11 ENCOUNTER — OFFICE VISIT (OUTPATIENT)
Dept: FAMILY MEDICINE CLINIC | Facility: CLINIC | Age: 74
End: 2019-03-11
Payer: MEDICARE

## 2019-03-11 VITALS
RESPIRATION RATE: 16 BRPM | HEART RATE: 80 BPM | SYSTOLIC BLOOD PRESSURE: 138 MMHG | DIASTOLIC BLOOD PRESSURE: 80 MMHG | BODY MASS INDEX: 30.8 KG/M2 | WEIGHT: 186.5 LBS

## 2019-03-11 DIAGNOSIS — E11.42 DIABETIC POLYNEUROPATHY ASSOCIATED WITH TYPE 2 DIABETES MELLITUS (HCC): ICD-10-CM

## 2019-03-11 DIAGNOSIS — E11.29 TYPE 2 DIABETES MELLITUS WITH MICROALBUMINURIA, WITHOUT LONG-TERM CURRENT USE OF INSULIN (HCC): Primary | ICD-10-CM

## 2019-03-11 DIAGNOSIS — R80.9 TYPE 2 DIABETES MELLITUS WITH MICROALBUMINURIA, WITHOUT LONG-TERM CURRENT USE OF INSULIN (HCC): Primary | ICD-10-CM

## 2019-03-11 DIAGNOSIS — E11.65 TYPE 2 DIABETES MELLITUS WITH HYPERGLYCEMIA, WITHOUT LONG-TERM CURRENT USE OF INSULIN (HCC): ICD-10-CM

## 2019-03-11 PROBLEM — E11.9 TYPE 2 DIABETES MELLITUS, WITHOUT LONG-TERM CURRENT USE OF INSULIN (HCC): Status: ACTIVE | Noted: 2019-03-11

## 2019-03-11 PROCEDURE — 99213 OFFICE O/P EST LOW 20 MIN: CPT | Performed by: FAMILY MEDICINE

## 2019-03-11 NOTE — PROGRESS NOTES
Assessment/Plan:    Type 2 diabetes without insulin use  Patient currently on diet    Diabetic polyneuropathy  For the meantime patient is holding off on any medication since it is tolerable    Diabetic nephropathy  Just at the beginning stages were patient's creatinine is elevated 1 39 any does have proteinuria  Forms filled appropriately    Recheck as scheduled or as needed    Subjective:   Oma Cota is a 68 y o male  Chief Complaint   Patient presents with    VA paperwork     Patient here with forms from the [place] for diabetic nephropathy and neuropathy and needing to have these done in order to try to get some disability compensation for them  Forms filled what patient and his wife were present questions answered for this forms as a fill them  Past Medical History:   Diagnosis Date    Abnormal glucose     last assessed [id number]    Anemia     last assessed [id number]    Benign neoplasm of descending colon 2009    Chondromalacia of patella 2011    unspecified laterality    Diverticulitis of colon 2011    Ganglion     last assessed [id number]    Gastroenteritis     last assessed [id number]    Glucose intolerance     last assessed [id number]    Hyperesthesia 2010    Rectal hemorrhage 2010     Social History     Tobacco Use    Smoking status: Former Smoker     Last attempt to quit: 1980     Years since quittin 2    Smokeless tobacco: Never Used    Tobacco comment: quit in [id number]   Substance Use Topics    Alcohol use: Yes     Comment: social    Drug use: No     Family History   Problem Relation Age of Onset    Alzheimer's disease Mother     Heart attack Father     Alcohol abuse Neg Hx     Substance Abuse Neg Hx        MEDICATIONS REVIEWED AND UPDATED    Rest Of 10 Point Review Of System Negative    Objective:    Vitals:    19 1502   BP: 138/80   Pulse: 80   Resp: 16     Body mass index is 30 8 kg/m²      Physical Exam   General  Patient in no acute distress, well appearing, well nourished and appears stated age    Mental status  Good judgment and insight, oriented to time person and place, recent and remote memory is intact, mood and affect are normal, cooperative, and patient is reasonable

## 2019-04-10 ENCOUNTER — TELEPHONE (OUTPATIENT)
Dept: GASTROENTEROLOGY | Facility: CLINIC | Age: 74
End: 2019-04-10

## 2019-06-03 ENCOUNTER — APPOINTMENT (OUTPATIENT)
Dept: LAB | Facility: HOSPITAL | Age: 74
End: 2019-06-03
Payer: MEDICARE

## 2019-06-03 DIAGNOSIS — E11.29 TYPE 2 DIABETES MELLITUS WITH MICROALBUMINURIA, WITHOUT LONG-TERM CURRENT USE OF INSULIN (HCC): ICD-10-CM

## 2019-06-03 DIAGNOSIS — R80.9 TYPE 2 DIABETES MELLITUS WITH MICROALBUMINURIA, WITHOUT LONG-TERM CURRENT USE OF INSULIN (HCC): ICD-10-CM

## 2019-06-03 LAB
EST. AVERAGE GLUCOSE BLD GHB EST-MCNC: 134 MG/DL
HBA1C MFR BLD: 6.3 % (ref 4.2–6.3)

## 2019-06-03 PROCEDURE — 83036 HEMOGLOBIN GLYCOSYLATED A1C: CPT

## 2019-06-03 PROCEDURE — 36415 COLL VENOUS BLD VENIPUNCTURE: CPT

## 2019-06-11 ENCOUNTER — OFFICE VISIT (OUTPATIENT)
Dept: FAMILY MEDICINE CLINIC | Facility: CLINIC | Age: 74
End: 2019-06-11
Payer: MEDICARE

## 2019-06-11 VITALS
HEIGHT: 66 IN | HEART RATE: 80 BPM | BODY MASS INDEX: 30.08 KG/M2 | WEIGHT: 187.2 LBS | DIASTOLIC BLOOD PRESSURE: 70 MMHG | RESPIRATION RATE: 16 BRPM | SYSTOLIC BLOOD PRESSURE: 130 MMHG

## 2019-06-11 DIAGNOSIS — E55.9 VITAMIN D DEFICIENCY: ICD-10-CM

## 2019-06-11 DIAGNOSIS — E11.65 TYPE 2 DIABETES MELLITUS WITH HYPERGLYCEMIA, WITHOUT LONG-TERM CURRENT USE OF INSULIN (HCC): ICD-10-CM

## 2019-06-11 DIAGNOSIS — I10 ESSENTIAL HYPERTENSION: ICD-10-CM

## 2019-06-11 DIAGNOSIS — E78.2 MIXED HYPERLIPIDEMIA: ICD-10-CM

## 2019-06-11 DIAGNOSIS — I77.811 ECTATIC ABDOMINAL AORTA (HCC): ICD-10-CM

## 2019-06-11 DIAGNOSIS — R80.9 TYPE 2 DIABETES MELLITUS WITH MICROALBUMINURIA, WITHOUT LONG-TERM CURRENT USE OF INSULIN (HCC): ICD-10-CM

## 2019-06-11 DIAGNOSIS — E11.29 TYPE 2 DIABETES MELLITUS WITH MICROALBUMINURIA, WITHOUT LONG-TERM CURRENT USE OF INSULIN (HCC): ICD-10-CM

## 2019-06-11 DIAGNOSIS — E11.42 DIABETIC POLYNEUROPATHY ASSOCIATED WITH TYPE 2 DIABETES MELLITUS (HCC): Primary | ICD-10-CM

## 2019-06-11 PROCEDURE — 99214 OFFICE O/P EST MOD 30 MIN: CPT | Performed by: FAMILY MEDICINE

## 2019-06-11 RX ORDER — PERPHENAZINE 16 MG
TABLET ORAL
COMMUNITY

## 2019-07-19 ENCOUNTER — SOCIAL WORK (OUTPATIENT)
Dept: BEHAVIORAL/MENTAL HEALTH CLINIC | Facility: CLINIC | Age: 74
End: 2019-07-19
Payer: MEDICARE

## 2019-07-19 DIAGNOSIS — F43.21 GRIEF AT LOSS OF CHILD: ICD-10-CM

## 2019-07-19 DIAGNOSIS — F43.23 ADJUSTMENT DISORDER WITH MIXED ANXIETY AND DEPRESSED MOOD: Primary | ICD-10-CM

## 2019-07-19 DIAGNOSIS — Z63.4 GRIEF AT LOSS OF CHILD: ICD-10-CM

## 2019-07-19 PROCEDURE — 90834 PSYTX W PT 45 MINUTES: CPT | Performed by: SOCIAL WORKER

## 2019-07-19 SDOH — SOCIAL STABILITY - SOCIAL INSECURITY: DISSAPEARANCE AND DEATH OF FAMILY MEMBER: Z63.4

## 2019-07-19 NOTE — PSYCH
Assessment/Plan:      Diagnoses and all orders for this visit:    Adjustment disorder with mixed anxiety and depressed mood    Grief at loss of child        Subjective:     Patient ID: Kayley Wu is a 68 y o  male  HPI     9:40am-10:35am      (D) Oma attended his first psychotherapy session today at the recommendation of Dr Arnold Wilkes Cristiano presents as a 68year old, , , heterosexual, male, reporting recent onset of worsening sadness, anxiety, grief, and depression in relation to the loss of his son  Mervatalberto reports that on 07/04/19 hid son committed suicide by hanging himself in his shed  Oma reports that they did not find out until 07/08/19 after he and his wife received a telephone call from their son's employer who reported that he had not been to work and they were unable to get in touch with him  Mervatalberto reports that at first he went to their son's house and he was searching for him, and then he couldn't find him, and then his wife Kyle Lacey went over to help him  Mervatalberto reports that he broke into their son's shed where he found their son hanging  Mervatalberto reports that he wouldn't let his wife see their son, and reports that she felt okay with this  Oma reports that he and his wife are in shock  Oma describes symptoms of grief and well as feelings of guilt  Oma lives with his wife Kyle Lacey in [place], [place]  Oma reports that he and his wife have been  for the past 47 years  Oma denies having a history of divorce  Oma confirms that he had (2) children, (1) daughter named [de-identified] 46years old and (3) son who passed away Oma who was 46years old  Mervatalberto confirms that he has an active 's licenses and car  Oma denies that he currently has any active mental health providers including psychiatric medication management, therapy, community case management, or peer supports   Mervatalberto denies having a history of any inpatient behavioral health treatment, no inpatient drug or alcohol  Oma reports having a history of outpatient counseling due to marriage issues, roughly 10-11 years and denies remembering where he went  Byalberto denies having a history of any drug or alcohol services  Oma confirms that his PCP is through [facility] and reports that he sees Dr Linda Bailey as a provider in the office  Debra Boateng confirms that he is insured through [employer]  Byalberto confirms that he has active RX coverage  Debra Boateng confirms that he uses [facility] as a local pharmacy and [facility]  Oma confirms that he has the following medical issues: Type 2 Diabetes, "Thanks to agent orange," and neuropathy  Byalberto denies having a history of any seizures  Byalberto denies that he is currently being prescribed any psychiatric medication  Byalberto denies that he has any siblings  Byalberto reports that his natural supports his wife  Oma denies having a family history of any mental illness  Byalberto reports that in regards to drug and alcohol family history, his paternal Uncle struggled with alcoholism  Byalberto denies having ever experienced a history of physical, verbal, emotional, or sexual abuse and denies that he is currently experiencing any forms of abuse  Byalberto denies that he identifies with any particular Hindu or spiritual beliefs  Mervatalberto reports that his highest level of completed education is an associates degree/ certification from [employer]  Byalberto reports that he went to [employer], and reports that he graduated in 1964  Oma reports that 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  Byalberto denies that he has any past or current legal issues  Byalberto denies that he is currently under the supervision of probation or parole  Byalberto denies having a legal POA, legal guardian, mental health advanced directive, rep-payee, and reports having a living will   Byalberto confirms that he legally owns, and reports that they are locked and secured  Oma denies that they are a risk factor for him in anyway  Oma denies that he requires any forms of ambulatory assistance  Oma denies having any past or current issues related to drugs and alcohol  Oma denies having a history of any self-injurious behaviors, or suicide attempts  Oma reports that he is a viet[employer] vet and served for 3 years  This writer provided psychoeducation  Discussed and reviewed various forms of coping skills, grounding techniques, distraction skills, and distress tolerance skills to self-manage symptoms more effectively  Discussed the importance of setting limits and boundaries and prioritizing mental health needs  (P) At this time, Oma does not want to follow up for ongoing psychotherapy services  Oma communicated that he feels that he is dealing with things in his own way  Reviewed that Oma can call the office at anytime and schedule follow up with this writer, if he changes his mind, or if he is interested in another referral for a local therapist, this writer can assist with this  In the interim, Mervatalberto plans to go through the grief process  Oma plans to work on setting healthy limits and boundaries  Oma plans to work on prioritizing his grief at this time  Oma plans to observe, monitor, and track, symptoms, cycles, and stressors to further explore how triggers impact overall symptom presentation  Oma plans to identify, utilize, and implement effective skills including coping skills, grounding techniques, distraction techniques, and distress tolerance skills to self-manage symptoms more effectively  Oma plans to work on increasing effective forms of communication to strengthen interpersonal relationships  Oma plans to outreach for additional support as needed  Review of Systems      Objective:     Physical Exam      (A) Oma presented as alert and oriented x3  Oma presented with good eye contact   Costa speech presented at a normal rate, volume, and rhythm  mOa denies that he experiences any symptoms of an elevated mood, simon, grandiose thinking, or impulsivity  Byalberto does not appear to ne endorsing criteria for simon at this time  Oma denies that he experiences any evident or immediate risk factors for self-harm, SI, or HI  Creed Wilmer presents as forward thinking and was able to identify and discuss various future plans and reasons to live  Oma denies that he ever experiences any perceptual disturbances and this writer did not observe Oma responding to any internal stimuli  Oma denies having any issues with falling asleep, reporting that he averages 5-6 hours of sleep a night  Oma reports that he wakes up throughout and when he wakes up throughout the night, it is easy for him to fall back asleep  Byalberto denies having any appetite changes  Byalberto denies that he is experiencing any increased worrying or nervousness  Byalberto denies having any racing thoughts, and reports having somewhat intrusive thoughts  Oma describes himself as dealing with grief in relation to the loss of his son  Oma's mood presented as sad and slightly depressed, and his affect appeared to be congruent  Byalberto describes himself as having experienced increased anxiety  Byalberto describes himself noticing increased irritability and poor frustration tolerance

## 2019-07-29 DIAGNOSIS — K20.90 ESOPHAGITIS: ICD-10-CM

## 2019-07-29 DIAGNOSIS — I10 ESSENTIAL HYPERTENSION: ICD-10-CM

## 2019-07-29 DIAGNOSIS — E78.2 MIXED HYPERLIPIDEMIA: ICD-10-CM

## 2019-07-29 DIAGNOSIS — G47.00 INSOMNIA, UNSPECIFIED TYPE: ICD-10-CM

## 2019-07-30 RX ORDER — TRAZODONE HYDROCHLORIDE 100 MG/1
TABLET ORAL
Qty: 90 TABLET | Refills: 1 | Status: SHIPPED | OUTPATIENT
Start: 2019-07-30 | End: 2019-09-20 | Stop reason: SDUPTHER

## 2019-07-30 RX ORDER — SIMVASTATIN 5 MG
TABLET ORAL
Qty: 90 TABLET | Refills: 1 | Status: SHIPPED | OUTPATIENT
Start: 2019-07-30 | End: 2019-09-20 | Stop reason: SDUPTHER

## 2019-07-30 RX ORDER — HYDROCHLOROTHIAZIDE 25 MG/1
TABLET ORAL
Qty: 45 TABLET | Refills: 1 | Status: SHIPPED | OUTPATIENT
Start: 2019-07-30 | End: 2019-09-20 | Stop reason: SDUPTHER

## 2019-07-30 RX ORDER — LISINOPRIL 40 MG/1
TABLET ORAL DAILY
Qty: 90 TABLET | Refills: 1 | Status: SHIPPED | OUTPATIENT
Start: 2019-07-30 | End: 2019-09-20 | Stop reason: SDUPTHER

## 2019-07-30 RX ORDER — OMEPRAZOLE 40 MG/1
CAPSULE, DELAYED RELEASE ORAL
Qty: 90 CAPSULE | Refills: 1 | Status: SHIPPED | OUTPATIENT
Start: 2019-07-30 | End: 2019-09-20 | Stop reason: SDUPTHER

## 2019-09-12 ENCOUNTER — APPOINTMENT (OUTPATIENT)
Dept: LAB | Facility: HOSPITAL | Age: 74
End: 2019-09-12
Payer: MEDICARE

## 2019-09-12 DIAGNOSIS — I10 ESSENTIAL HYPERTENSION: ICD-10-CM

## 2019-09-12 DIAGNOSIS — R80.9 TYPE 2 DIABETES MELLITUS WITH MICROALBUMINURIA, WITHOUT LONG-TERM CURRENT USE OF INSULIN (HCC): ICD-10-CM

## 2019-09-12 DIAGNOSIS — E11.29 TYPE 2 DIABETES MELLITUS WITH MICROALBUMINURIA, WITHOUT LONG-TERM CURRENT USE OF INSULIN (HCC): ICD-10-CM

## 2019-09-12 LAB
ANION GAP SERPL CALCULATED.3IONS-SCNC: 12 MMOL/L (ref 4–13)
BUN SERPL-MCNC: 22 MG/DL (ref 5–25)
CALCIUM SERPL-MCNC: 8.7 MG/DL (ref 8.3–10.1)
CHLORIDE SERPL-SCNC: 104 MMOL/L (ref 100–108)
CO2 SERPL-SCNC: 24 MMOL/L (ref 21–32)
CREAT SERPL-MCNC: 1.27 MG/DL (ref 0.6–1.3)
CREAT UR-MCNC: 182 MG/DL
EST. AVERAGE GLUCOSE BLD GHB EST-MCNC: 128 MG/DL
GFR SERPL CREATININE-BSD FRML MDRD: 56 ML/MIN/1.73SQ M
GLUCOSE P FAST SERPL-MCNC: 131 MG/DL (ref 65–99)
HBA1C MFR BLD: 6.1 % (ref 4.2–6.3)
MICROALBUMIN UR-MCNC: 11.1 MG/L (ref 0–20)
MICROALBUMIN/CREAT 24H UR: 6 MG/G CREATININE (ref 0–30)
POTASSIUM SERPL-SCNC: 3.8 MMOL/L (ref 3.5–5.3)
SODIUM SERPL-SCNC: 140 MMOL/L (ref 136–145)

## 2019-09-12 PROCEDURE — 82043 UR ALBUMIN QUANTITATIVE: CPT | Performed by: FAMILY MEDICINE

## 2019-09-12 PROCEDURE — 82570 ASSAY OF URINE CREATININE: CPT | Performed by: FAMILY MEDICINE

## 2019-09-12 PROCEDURE — 83036 HEMOGLOBIN GLYCOSYLATED A1C: CPT

## 2019-09-12 PROCEDURE — 36415 COLL VENOUS BLD VENIPUNCTURE: CPT

## 2019-09-12 PROCEDURE — 80048 BASIC METABOLIC PNL TOTAL CA: CPT

## 2019-09-18 NOTE — PROGRESS NOTES
ASSESSMENT/PLAN:    Type 2 diabetes  Stable and improving 6 1 from 6 3  Lifestyle modifications only no medicines  Recheck A1c in 3 months     Diabetic neuropathy  Excellent with alpha lipoic acid we will continue the same     Diabetic nephropathy  Creatinine steady at 1 27 was 1 39 GFR has ranged between 52 and 56 continue to check      Ectatic aorta  This year's CAT scan says he has no aortic aneurysm so we will stop following this        Esophageal reflux  Only omeprazole works      Hyperlipidemia   Cholesterol values excellent with simvastatin only       Hypertension   Blood pressure is good with lisinopril HCTZ            Recheck in 3 months  Screening labs A1c PSA vitamin-D  Recheck sooner if needed       Health Maintenance   Topic Date Due    Hepatitis C Screening  1945    SLP PLAN OF CARE  1945    BMI: Followup Plan  10/08/1963    HEPATITIS B VACCINES (1 of 3 - Risk 3-dose series) 10/08/1964    INFLUENZA VACCINE  07/01/2019    DM Eye Exam  01/29/2020    Fall Risk  03/01/2020    Medicare Annual Wellness Visit (AWV)  03/01/2020    HEMOGLOBIN A1C  03/12/2020    Diabetic Foot Exam  06/11/2020    Depression Screening PHQ  07/19/2020    URINE MICROALBUMIN  09/12/2020    BMI: Adult  09/20/2020    CRC Screening: Colonoscopy  07/24/2023    DTaP,Tdap,and Td Vaccines (4 - Td) 07/17/2029    Pneumococcal Vaccine: 65+ Years  Completed    Pneumococcal Vaccine: Pediatrics (0 to 5 Years) and At-Risk Patients (6 to 59 Years)  Aged Out         Problem List as of 9/20/2019 Reviewed: 6/11/2019 10:44 AM by Newton Santos, DO    Actinic keratosis    Adjustment disorder with mixed anxiety and depressed mood    Allergic rhinitis    Creatinine elevation    De Quervain's tenosynovitis, right    Diabetic polyneuropathy associated with type 2 diabetes mellitus ([facility] [address] )    Diverticulosis    Elastosis of skin    Esophageal reflux    Essential hypertension    Grief at loss of child    Hiatal hernia    Insomnia Mixed hyperlipidemia    Tinnitus    Trigger middle finger of right hand    Type 2 diabetes mellitus with microalbuminuria, without long-term current use of insulin (HCC)    Type 2 diabetes mellitus, without long-term current use of insulin (HCC)    Vitamin D deficiency            Subjective:   Chief Complaint   Patient presents with    Diabetes     Patient is here to recheck on his diabetes  He has no complaints other than needing refills  Been taking his medications as prescribed      patient ID: Peace Dorsey is a 68 y o  male      Past Medical History:   Diagnosis Date    Abnormal glucose     last assessed [id number]    Anemia     last assessed [id number]    Benign neoplasm of descending colon 2009    Chondromalacia of patella 2011    unspecified laterality    Diverticulitis of colon 2011    Ganglion     last assessed [id number]    Gastroenteritis     last assessed [id number]    Glucose intolerance     last assessed [id number]    Hyperesthesia 2010    Rectal hemorrhage 2010     Past Surgical History:   Procedure Laterality Date    ADENOIDECTOMY  1950    INCISIONAL BREAST BIOPSY Left 1980    L breast did bx     NECK SURGERY  1950    neck gland removed as child [address]  1950     Family History   Problem Relation Age of Onset    Alzheimer's disease Mother     Heart attack Father     Alcohol abuse Neg Hx     Substance Abuse Neg Hx      Social History     Tobacco Use    Smoking status: Former Smoker     Last attempt to quit:      Years since quittin 7    Smokeless tobacco: Never Used    Tobacco comment: quit in [id number]   Substance Use Topics    Alcohol use: Yes     Comment: social    Drug use: No     Social History     Tobacco Use   Smoking Status Former Smoker    Last attempt to quit: Mahsa Jeffery Years since quittin 7   Smokeless Tobacco Never Used   Tobacco Comment    quit in [id number]        MED LIST WAS REVIEWED AND UPDATED       ROS    Constitutional  No fever chills no fatigue no weight loss no weight gain    Mental status  No anxiety or depression and no sleep disturbances no changes in personality or emotional problems no suicidal or homicidal ideations    Eyes  No eye pain no red eyes no visual disturbances no discharge no eye itch    ENT  No earache no hearing loss nasal discharge no sore throat no hoarseness no postnasal drip     Cardio  No chest pain no palpitations no leg edema no claudication no dyspnea on exertion no nocturnal dyspnea    Respiratory  No shortness of breath or wheeze no cough no orthopnea no dyspnea on exertion no hemoptysis no sputum production    GI  No abdominal pain no nausea no vomiting no diarrhea or constipation no bloody stools no change in bowel habits no change in weight      no dysuria hematuria no pyuria no incontinence no pelvic pain    Musculoskeletal  No myalgias arthralgias no joint swelling or stiffness no limb pain or swelling    Skin  No rashes or lesions no itchiness and no wounds    Neuro  No headache dizziness lightheadedness syncope numbness paresthesias or confusion    Heme  No swollen glands no easy bruising            Objective:      VITALS:  Wt Readings from Last 3 Encounters:   09/20/19 81 5 kg (179 lb 11 2 oz)   06/11/19 84 9 kg (187 lb 3 2 oz)   03/11/19 84 6 kg (186 lb 8 oz)     BP Readings from Last 3 Encounters:   09/20/19 122/60   06/11/19 130/70   03/11/19 138/80     Pulse Readings from Last 3 Encounters:   09/20/19 84   06/11/19 80   03/11/19 80     Body mass index is 29 45 kg/m²      Laboratory Results:   Lab Results   Component Value Date    WBC 6 91 11/19/2018    WBC 6 87 11/20/2017    WBC 8 12 08/11/2016    HGB 12 9 11/19/2018    HGB 13 8 11/20/2017    HGB 13 4 08/11/2016    HCT 38 8 11/19/2018    HCT 40 7 11/20/2017    HCT 39 5 08/11/2016    MCV 94 11/19/2018    MCV 93 11/20/2017    MCV 92 08/11/2016     11/19/2018     11/20/2017     08/11/2016     Lab Results   Component Value Date     08/10/2015     07/02/2014    K 3 8 09/12/2019    K 4 2 11/19/2018    K 3 9 05/21/2018     09/12/2019     11/19/2018     05/21/2018    CO2 24 09/12/2019    CO2 26 11/19/2018    CO2 26 05/21/2018    BUN 22 09/12/2019    BUN 20 11/19/2018    BUN 23 05/21/2018     Lab Results   Component Value Date    GLUCOSE 132 08/10/2015    ALT 33 11/19/2018    AST 19 11/19/2018    ALKPHOS 62 11/19/2018    EGFR 56 09/12/2019    CALCIUM 8 7 09/12/2019     No results found for: ZBQANT5      Lipid Profile:   Lab Results   Component Value Date    CHOL 125 08/10/2015    CHOL 152 02/10/2015    CHOL 185 07/02/2014     Lab Results   Component Value Date    HDL 32 (L) 11/19/2018    HDL 36 (L) 11/20/2017    HDL 34 (L) 08/11/2016     Lab Results   Component Value Date    LDLCALC 84 11/19/2018    LDLCALC 89 11/20/2017    LDLCALC 60 08/11/2016     Lab Results   Component Value Date    TRIG 202 (H) 11/19/2018    TRIG 196 (H) 11/20/2017    TRIG 211 (H) 08/11/2016       Diabetic labs (if applicable)  Lab Results   Component Value Date    HGBA1C 6 1 09/12/2019    HGBA1C 6 3 06/03/2019    HGBA1C 6 2 03/01/2019     Lab Results   Component Value Date    GLUF 131 (H) 09/12/2019    LDLCALC 84 11/19/2018    CREATININE 1 27 09/12/2019          Physical Exam  Constitutional  Appears healthy, Looks well, Appearance consistent with age    Mental Status  Alert, Oriented, Cooperative, Memory function normal , clean, and reasonable    Neck  No neck mass, No thyromegaly, Good carotid upstrokes bilaterally, trachea midline positive click    Respiratory  Breath sounds normal, No rales, No rhonchi, No wheezing, normal palpation    Cardiac   Regular rhythm without ectopy or murmur no S3-S4, no heave lift or thrill to palpation    Vascular  No leg edema, No pedal edema    Muscular skeletal  No clubbing cyanosis , muscle tone normal    Skin  No appreciable rashes or abnormal appearing lesions

## 2019-09-20 ENCOUNTER — OFFICE VISIT (OUTPATIENT)
Dept: FAMILY MEDICINE CLINIC | Facility: CLINIC | Age: 74
End: 2019-09-20
Payer: MEDICARE

## 2019-09-20 VITALS
WEIGHT: 179.7 LBS | DIASTOLIC BLOOD PRESSURE: 60 MMHG | BODY MASS INDEX: 28.88 KG/M2 | RESPIRATION RATE: 16 BRPM | HEIGHT: 66 IN | SYSTOLIC BLOOD PRESSURE: 122 MMHG | HEART RATE: 84 BPM

## 2019-09-20 DIAGNOSIS — E55.9 VITAMIN D DEFICIENCY: ICD-10-CM

## 2019-09-20 DIAGNOSIS — K20.90 ESOPHAGITIS: ICD-10-CM

## 2019-09-20 DIAGNOSIS — Z12.5 PROSTATE CANCER SCREENING: ICD-10-CM

## 2019-09-20 DIAGNOSIS — E11.42 DIABETIC POLYNEUROPATHY ASSOCIATED WITH TYPE 2 DIABETES MELLITUS (HCC): ICD-10-CM

## 2019-09-20 DIAGNOSIS — R80.9 TYPE 2 DIABETES MELLITUS WITH MICROALBUMINURIA, WITHOUT LONG-TERM CURRENT USE OF INSULIN (HCC): Primary | ICD-10-CM

## 2019-09-20 DIAGNOSIS — E78.2 MIXED HYPERLIPIDEMIA: ICD-10-CM

## 2019-09-20 DIAGNOSIS — G47.00 INSOMNIA, UNSPECIFIED TYPE: ICD-10-CM

## 2019-09-20 DIAGNOSIS — K21.9 GASTROESOPHAGEAL REFLUX DISEASE WITHOUT ESOPHAGITIS: ICD-10-CM

## 2019-09-20 DIAGNOSIS — I10 ESSENTIAL HYPERTENSION: ICD-10-CM

## 2019-09-20 DIAGNOSIS — R73.9 HYPERGLYCEMIA: ICD-10-CM

## 2019-09-20 DIAGNOSIS — Z23 NEED FOR VACCINATION: ICD-10-CM

## 2019-09-20 DIAGNOSIS — E11.29 TYPE 2 DIABETES MELLITUS WITH MICROALBUMINURIA, WITHOUT LONG-TERM CURRENT USE OF INSULIN (HCC): Primary | ICD-10-CM

## 2019-09-20 DIAGNOSIS — R79.89 CREATININE ELEVATION: ICD-10-CM

## 2019-09-20 PROBLEM — E11.9 TYPE 2 DIABETES MELLITUS, WITHOUT LONG-TERM CURRENT USE OF INSULIN (HCC): Status: RESOLVED | Noted: 2019-03-11 | Resolved: 2019-09-20

## 2019-09-20 PROCEDURE — 99214 OFFICE O/P EST MOD 30 MIN: CPT | Performed by: FAMILY MEDICINE

## 2019-09-20 PROCEDURE — G0008 ADMIN INFLUENZA VIRUS VAC: HCPCS

## 2019-09-20 PROCEDURE — 90662 IIV NO PRSV INCREASED AG IM: CPT

## 2019-09-20 PROCEDURE — 93000 ELECTROCARDIOGRAM COMPLETE: CPT | Performed by: FAMILY MEDICINE

## 2019-09-20 RX ORDER — TRAZODONE HYDROCHLORIDE 100 MG/1
TABLET ORAL
Qty: 90 TABLET | Refills: 1 | Status: SHIPPED | OUTPATIENT
Start: 2019-09-20 | End: 2020-04-09

## 2019-09-20 RX ORDER — LISINOPRIL 40 MG/1
40 TABLET ORAL DAILY
Qty: 90 TABLET | Refills: 1 | Status: SHIPPED | OUTPATIENT
Start: 2019-09-20 | End: 2020-04-09

## 2019-09-20 RX ORDER — LANCETS
EACH MISCELLANEOUS
Qty: 100 EACH | Refills: 3 | Status: SHIPPED | OUTPATIENT
Start: 2019-09-20

## 2019-09-20 RX ORDER — SIMVASTATIN 5 MG
5 TABLET ORAL
Qty: 90 TABLET | Refills: 1 | Status: SHIPPED | OUTPATIENT
Start: 2019-09-20 | End: 2020-04-09

## 2019-09-20 RX ORDER — OMEPRAZOLE 40 MG/1
40 CAPSULE, DELAYED RELEASE ORAL DAILY
Qty: 90 CAPSULE | Refills: 1 | Status: SHIPPED | OUTPATIENT
Start: 2019-09-20 | End: 2020-04-09

## 2019-09-20 RX ORDER — HYDROCHLOROTHIAZIDE 25 MG/1
37.5 TABLET ORAL DAILY
Qty: 135 TABLET | Refills: 1 | Status: SHIPPED | OUTPATIENT
Start: 2019-09-20 | End: 2020-04-09

## 2019-09-20 NOTE — PROGRESS NOTES
BMI Counseling: Body mass index is 29 45 kg/m²  The BMI is above normal  Nutrition recommendations include reducing portion sizes

## 2019-09-20 NOTE — PATIENT INSTRUCTIONS
1  Keep all the meds the same    See you back in 3 months with fasting blood work and urine 1 week prior

## 2019-12-10 ENCOUNTER — APPOINTMENT (OUTPATIENT)
Dept: LAB | Facility: HOSPITAL | Age: 74
End: 2019-12-10
Payer: MEDICARE

## 2019-12-10 DIAGNOSIS — R80.9 TYPE 2 DIABETES MELLITUS WITH MICROALBUMINURIA, WITHOUT LONG-TERM CURRENT USE OF INSULIN (HCC): ICD-10-CM

## 2019-12-10 DIAGNOSIS — Z12.5 PROSTATE CANCER SCREENING: ICD-10-CM

## 2019-12-10 DIAGNOSIS — E78.2 MIXED HYPERLIPIDEMIA: ICD-10-CM

## 2019-12-10 DIAGNOSIS — I10 ESSENTIAL HYPERTENSION: ICD-10-CM

## 2019-12-10 DIAGNOSIS — E55.9 VITAMIN D DEFICIENCY: ICD-10-CM

## 2019-12-10 DIAGNOSIS — E11.29 TYPE 2 DIABETES MELLITUS WITH MICROALBUMINURIA, WITHOUT LONG-TERM CURRENT USE OF INSULIN (HCC): ICD-10-CM

## 2019-12-10 LAB
25(OH)D3 SERPL-MCNC: 40.6 NG/ML (ref 30–100)
ALBUMIN SERPL BCP-MCNC: 3.7 G/DL (ref 3.5–5)
ALP SERPL-CCNC: 71 U/L (ref 46–116)
ALT SERPL W P-5'-P-CCNC: 33 U/L (ref 12–78)
ANION GAP SERPL CALCULATED.3IONS-SCNC: 4 MMOL/L (ref 4–13)
AST SERPL W P-5'-P-CCNC: 18 U/L (ref 5–45)
BASOPHILS # BLD AUTO: 0.05 THOUSANDS/ΜL (ref 0–0.1)
BASOPHILS NFR BLD AUTO: 1 % (ref 0–1)
BILIRUB SERPL-MCNC: 0.4 MG/DL (ref 0.2–1)
BILIRUB UR QL STRIP: NEGATIVE
BUN SERPL-MCNC: 18 MG/DL (ref 5–25)
CALCIUM SERPL-MCNC: 8.8 MG/DL (ref 8.3–10.1)
CHLORIDE SERPL-SCNC: 109 MMOL/L (ref 100–108)
CHOLEST SERPL-MCNC: 152 MG/DL (ref 50–200)
CLARITY UR: CLEAR
CO2 SERPL-SCNC: 26 MMOL/L (ref 21–32)
COLOR UR: YELLOW
CREAT SERPL-MCNC: 1.12 MG/DL (ref 0.6–1.3)
CREAT UR-MCNC: 198 MG/DL
EOSINOPHIL # BLD AUTO: 0.23 THOUSAND/ΜL (ref 0–0.61)
EOSINOPHIL NFR BLD AUTO: 4 % (ref 0–6)
ERYTHROCYTE [DISTWIDTH] IN BLOOD BY AUTOMATED COUNT: 13.4 % (ref 11.6–15.1)
EST. AVERAGE GLUCOSE BLD GHB EST-MCNC: 140 MG/DL
GFR SERPL CREATININE-BSD FRML MDRD: 64 ML/MIN/1.73SQ M
GLUCOSE P FAST SERPL-MCNC: 124 MG/DL (ref 65–99)
GLUCOSE UR STRIP-MCNC: NEGATIVE MG/DL
HBA1C MFR BLD: 6.5 % (ref 4.2–6.3)
HCT VFR BLD AUTO: 36.8 % (ref 36.5–49.3)
HDLC SERPL-MCNC: 29 MG/DL
HGB BLD-MCNC: 11.7 G/DL (ref 12–17)
HGB UR QL STRIP.AUTO: NEGATIVE
IMM GRANULOCYTES # BLD AUTO: 0.01 THOUSAND/UL (ref 0–0.2)
IMM GRANULOCYTES NFR BLD AUTO: 0 % (ref 0–2)
KETONES UR STRIP-MCNC: NEGATIVE MG/DL
LDLC SERPL CALC-MCNC: 72 MG/DL (ref 0–100)
LEUKOCYTE ESTERASE UR QL STRIP: NEGATIVE
LYMPHOCYTES # BLD AUTO: 1.92 THOUSANDS/ΜL (ref 0.6–4.47)
LYMPHOCYTES NFR BLD AUTO: 30 % (ref 14–44)
MCH RBC QN AUTO: 28.4 PG (ref 26.8–34.3)
MCHC RBC AUTO-ENTMCNC: 31.8 G/DL (ref 31.4–37.4)
MCV RBC AUTO: 89 FL (ref 82–98)
MICROALBUMIN UR-MCNC: 10.4 MG/L (ref 0–20)
MICROALBUMIN/CREAT 24H UR: 5 MG/G CREATININE (ref 0–30)
MONOCYTES # BLD AUTO: 0.93 THOUSAND/ΜL (ref 0.17–1.22)
MONOCYTES NFR BLD AUTO: 14 % (ref 4–12)
NEUTROPHILS # BLD AUTO: 3.33 THOUSANDS/ΜL (ref 1.85–7.62)
NEUTS SEG NFR BLD AUTO: 51 % (ref 43–75)
NITRITE UR QL STRIP: NEGATIVE
NONHDLC SERPL-MCNC: 123 MG/DL
NRBC BLD AUTO-RTO: 0 /100 WBCS
PH UR STRIP.AUTO: 5.5 [PH]
PLATELET # BLD AUTO: 298 THOUSANDS/UL (ref 149–390)
PMV BLD AUTO: 10.8 FL (ref 8.9–12.7)
POTASSIUM SERPL-SCNC: 3.8 MMOL/L (ref 3.5–5.3)
PROT SERPL-MCNC: 7.2 G/DL (ref 6.4–8.2)
PROT UR STRIP-MCNC: NEGATIVE MG/DL
PSA SERPL-MCNC: 0.8 NG/ML (ref 0–4)
RBC # BLD AUTO: 4.12 MILLION/UL (ref 3.88–5.62)
SODIUM SERPL-SCNC: 139 MMOL/L (ref 136–145)
SP GR UR STRIP.AUTO: >=1.03 (ref 1–1.03)
TRIGL SERPL-MCNC: 253 MG/DL
TSH SERPL DL<=0.05 MIU/L-ACNC: 1.68 UIU/ML (ref 0.36–3.74)
UROBILINOGEN UR QL STRIP.AUTO: 0.2 E.U./DL
WBC # BLD AUTO: 6.47 THOUSAND/UL (ref 4.31–10.16)

## 2019-12-10 PROCEDURE — 85025 COMPLETE CBC W/AUTO DIFF WBC: CPT

## 2019-12-10 PROCEDURE — 83036 HEMOGLOBIN GLYCOSYLATED A1C: CPT

## 2019-12-10 PROCEDURE — 82043 UR ALBUMIN QUANTITATIVE: CPT

## 2019-12-10 PROCEDURE — 82570 ASSAY OF URINE CREATININE: CPT

## 2019-12-10 PROCEDURE — 80061 LIPID PANEL: CPT

## 2019-12-10 PROCEDURE — 82306 VITAMIN D 25 HYDROXY: CPT

## 2019-12-10 PROCEDURE — 81003 URINALYSIS AUTO W/O SCOPE: CPT

## 2019-12-10 PROCEDURE — 36415 COLL VENOUS BLD VENIPUNCTURE: CPT

## 2019-12-10 PROCEDURE — 84443 ASSAY THYROID STIM HORMONE: CPT

## 2019-12-10 PROCEDURE — 80053 COMPREHEN METABOLIC PANEL: CPT

## 2019-12-10 PROCEDURE — G0103 PSA SCREENING: HCPCS

## 2019-12-19 ENCOUNTER — OFFICE VISIT (OUTPATIENT)
Dept: FAMILY MEDICINE CLINIC | Facility: CLINIC | Age: 74
End: 2019-12-19
Payer: MEDICARE

## 2019-12-19 VITALS
HEART RATE: 84 BPM | BODY MASS INDEX: 29.09 KG/M2 | RESPIRATION RATE: 16 BRPM | DIASTOLIC BLOOD PRESSURE: 60 MMHG | SYSTOLIC BLOOD PRESSURE: 120 MMHG | HEIGHT: 66 IN | WEIGHT: 181 LBS

## 2019-12-19 DIAGNOSIS — R80.9 TYPE 2 DIABETES MELLITUS WITH MICROALBUMINURIA, WITHOUT LONG-TERM CURRENT USE OF INSULIN (HCC): Primary | ICD-10-CM

## 2019-12-19 DIAGNOSIS — E78.2 MIXED HYPERLIPIDEMIA: ICD-10-CM

## 2019-12-19 DIAGNOSIS — K21.9 GASTROESOPHAGEAL REFLUX DISEASE WITHOUT ESOPHAGITIS: ICD-10-CM

## 2019-12-19 DIAGNOSIS — E11.29 TYPE 2 DIABETES MELLITUS WITH MICROALBUMINURIA, WITHOUT LONG-TERM CURRENT USE OF INSULIN (HCC): Primary | ICD-10-CM

## 2019-12-19 DIAGNOSIS — E55.9 VITAMIN D DEFICIENCY: ICD-10-CM

## 2019-12-19 DIAGNOSIS — E11.42 DIABETIC POLYNEUROPATHY ASSOCIATED WITH TYPE 2 DIABETES MELLITUS (HCC): ICD-10-CM

## 2019-12-19 DIAGNOSIS — I10 ESSENTIAL HYPERTENSION: ICD-10-CM

## 2019-12-19 DIAGNOSIS — R79.89 CREATININE ELEVATION: ICD-10-CM

## 2019-12-19 PROCEDURE — 99214 OFFICE O/P EST MOD 30 MIN: CPT | Performed by: FAMILY MEDICINE

## 2019-12-19 NOTE — PROGRESS NOTES
ASSESSMENT/PLAN:    Type 2 diabetes  Elevated 6 5 from 6 1  Patient will continue to watch his diet and continue to exercise and we will recheck this in 3 months     Diabetic neuropathy  Excellent with alpha lipoic acid we will continue the same    Diabetic nephropathy  Creatinine is excellent today at 1 12    Was 1 27, 1 39 GFR has ranged between 52 and 56 continue to check      Ectatic aorta  This year's CAT scan says he has no aortic aneurysm so we will stop following this        Esophageal reflux  Only omeprazole works      Hyperlipidemia   Cholesterol values excellent with simvastatin only       Hypertension   Blood pressure is good with lisinopril HCTZ            Recheck in 3 months  A [place] that day  Screening labs A1c   Recheck sooner if needed          Health Maintenance   Topic Date Due    Hepatitis C Screening  1945    SLP PLAN OF CARE  1945    Hepatitis B Vaccine (1 of 3 - Risk 3-dose series) 10/08/1964    DM Eye Exam  01/29/2020    Fall Risk  03/01/2020    Medicare Annual Wellness Visit (AWV)  03/01/2020    HEMOGLOBIN A1C  06/10/2020    Diabetic Foot Exam  06/11/2020    Depression Screening PHQ  07/19/2020    BMI: Followup Plan  09/20/2020    URINE MICROALBUMIN  12/10/2020    BMI: Adult  12/19/2020    CRC Screening: Colonoscopy  07/24/2023    DTaP,Tdap,and Td Vaccines (4 - Td) 07/17/2029    Influenza Vaccine  Completed    Pneumococcal Vaccine: 65+ Years  Completed    Pneumococcal Vaccine: Pediatrics (0 to 5 Years) and At-Risk Patients (6 to 59 Years)  Aged Out    HIB Vaccine  Aged Out    IPV Vaccine  Aged Out    Hepatitis A Vaccine  Aged Out    Meningococcal ACWY Vaccine  Aged Out    HPV Vaccine  Aged Out         Problem List as of 12/19/2019 Reviewed: 12/19/2019 10:00 AM by Lennie Montemayor,     Actinic keratosis    Adjustment disorder with mixed anxiety and depressed mood    Allergic rhinitis    Creatinine elevation    De Quervain's tenosynovitis, right    Diabetic polyneuropathy associated with type 2 diabetes mellitus (HCC)    Diverticulosis    Elastosis of skin    Essential hypertension    Gastroesophageal reflux disease without esophagitis    Grief at loss of child    Hiatal hernia    Insomnia    Mixed hyperlipidemia    Tinnitus    Trigger middle finger of right hand    Type 2 diabetes mellitus with microalbuminuria, without long-term current use of insulin (HCC)    Vitamin D deficiency            Subjective:   Chief Complaint   Patient presents with    Diabetes     Patient is here for his 3 month diabetes check  He has been keeping himself active by helping his daughter low move  He did give blood this past November  patient ID: Mina Gutierrez is a 76 y o  male      Past Medical History:   Diagnosis Date    Abnormal glucose     last assessed 2012    Anemia     last assessed [id number]    Benign neoplasm of descending colon 2009    Chondromalacia of patella 2011    unspecified laterality    Diverticulitis of colon 2011    Ganglion     last assessed [id number]    Gastroenteritis     last assessed [id number]    Glucose intolerance     last assessed [id number]    Hyperesthesia 2010    Rectal hemorrhage 2010     Past Surgical History:   Procedure Laterality Date    ADENOIDECTOMY  1950    INCISIONAL BREAST BIOPSY Left 1980    L breast did bx     NECK SURGERY  1950    neck gland removed as child [address]   1950     Family History   Problem Relation Age of Onset    Alzheimer's disease Mother     Heart attack Father     Alcohol abuse Neg Hx     Substance Abuse Neg Hx      Social History     Tobacco Use    Smoking status: Former Smoker     Last attempt to quit: 1980     Years since quittin 9    Smokeless tobacco: Never Used    Tobacco comment: quit in [id number]   Substance Use Topics    Alcohol use: Yes     Comment: social    Drug use: No     Social History Tobacco Use   Smoking Status Former Smoker    Last attempt to quit: Laisha Garzon 39 Years since quittin 9   Smokeless Tobacco Never Used   Tobacco Comment    quit in [id number]        MED LIST WAS REVIEWED AND UPDATED       ROS    Constitutional  No fever chills no fatigue no weight loss no weight gain    Mental status  No anxiety or depression and no sleep disturbances no changes in personality or emotional problems no suicidal or homicidal ideations    Eyes  No eye pain no red eyes no visual disturbances no discharge no eye itch    ENT  No earache no hearing loss nasal discharge no sore throat no hoarseness no postnasal drip     Cardio  No chest pain no palpitations no leg edema no claudication no dyspnea on exertion no nocturnal dyspnea    Respiratory  No shortness of breath or wheeze no cough no orthopnea no dyspnea on exertion no hemoptysis no sputum production    GI  No abdominal pain no nausea no vomiting no diarrhea or constipation no bloody stools no change in bowel habits no change in weight      no dysuria hematuria no pyuria no incontinence no pelvic pain    Musculoskeletal  No myalgias arthralgias no joint swelling or stiffness no limb pain or swelling    Skin  No rashes or lesions no itchiness and no wounds    Neuro  No headache dizziness lightheadedness syncope numbness paresthesias or confusion    Heme  No swollen glands no easy bruising            Objective:      VITALS:  Wt Readings from Last 3 Encounters:   19 82 1 kg (181 lb)   19 81 5 kg (179 lb 11 2 oz)   19 84 9 kg (187 lb 3 2 oz)     BP Readings from Last 3 Encounters:   19 120/60   19 122/60   19 130/70     Pulse Readings from Last 3 Encounters:   19 84   19 84   19 80     Body mass index is 29 66 kg/m²  Laboratory Results:    All pertinent labs and studies were reviewed with patient  during this office visit  including the following:    Lab Results   Component Value Date    WBC 6 47 12/10/2019    WBC 6 91 11/19/2018    WBC 6 87 11/20/2017    HGB 11 7 (L) 12/10/2019    HGB 12 9 11/19/2018    HGB 13 8 11/20/2017    HCT 36 8 12/10/2019    HCT 38 8 11/19/2018    HCT 40 7 11/20/2017    MCV 89 12/10/2019    MCV 94 11/19/2018    MCV 93 11/20/2017     12/10/2019     11/19/2018     11/20/2017     Lab Results   Component Value Date     08/10/2015     07/02/2014    K 3 8 12/10/2019    K 3 8 09/12/2019    K 4 2 11/19/2018     (H) 12/10/2019     09/12/2019     11/19/2018    CO2 26 12/10/2019    CO2 24 09/12/2019    CO2 26 11/19/2018    BUN 18 12/10/2019    BUN 22 09/12/2019    BUN 20 11/19/2018     Lab Results   Component Value Date    GLUCOSE 132 08/10/2015    ALT 33 12/10/2019    AST 18 12/10/2019    ALKPHOS 71 12/10/2019    EGFR 64 12/10/2019    CALCIUM 8 8 12/10/2019     No results found for: TSHRFT4        Lipid Profile:   Lab Results   Component Value Date    CHOL 125 08/10/2015    CHOL 152 02/10/2015    CHOL 185 07/02/2014     Lab Results   Component Value Date    HDL 29 (L) 12/10/2019    HDL 32 (L) 11/19/2018    HDL 36 (L) 11/20/2017     Lab Results   Component Value Date    LDLCALC 72 12/10/2019    LDLCALC 84 11/19/2018    LDLCALC 89 11/20/2017     Lab Results   Component Value Date    TRIG 253 (H) 12/10/2019    TRIG 202 (H) 11/19/2018    TRIG 196 (H) 11/20/2017       Diabetic labs (if applicable)  Lab Results   Component Value Date    HGBA1C 6 5 (H) 12/10/2019    HGBA1C 6 1 09/12/2019    HGBA1C 6 3 06/03/2019     Lab Results   Component Value Date    GLUF 124 (H) 12/10/2019    LDLCALC 72 12/10/2019    CREATININE 1 12 12/10/2019          Physical Exam      Gen    No acute distress well-appearing well-nourished appears stated age    Mental status  Good judgment and insight oriented to time person and place, recent and remote memory intact mood and affect normal cooperative and patient is reasonable    HEENT  PERRLA 3 mm, EOMI without nystagmus, TMs clear, turbinates open pink no exudate, pharynx benign, tongue midline    Neck   supple no masses trachea midline positive click normal carotid upstrokes with no bruits    Cor  Regular rhythm without ectopy or murmur, no S3-S4, normal palpation that is no heave lift or thrill    Vascular  No edema, good pedal pulses    Lungs  CTA bilaterally in no respiratory distress no wheezes rhonchi or rales, normal to palpation no tactile fremitus    Abdomen  Soft, no palpable masses, no hepatosplenomegaly, normal bowel sounds, nontender    Lymphatics  No palpable nodes in the neck, supraclavicular area, axilla, or groin     Musculoskeletal  No clubbing cyanosis or edema muscle tone normal    Skin  no rashes or abnormal appearing lesions    Neuro  Normal ambulation, cranial nerves 2-12 grossly intact, higher functioning with reasoning intact

## 2020-01-07 LAB
LEFT EYE DIABETIC RETINOPATHY: NORMAL
RIGHT EYE DIABETIC RETINOPATHY: NORMAL

## 2020-04-09 DIAGNOSIS — G47.00 INSOMNIA, UNSPECIFIED TYPE: ICD-10-CM

## 2020-04-09 DIAGNOSIS — E78.2 MIXED HYPERLIPIDEMIA: ICD-10-CM

## 2020-04-09 DIAGNOSIS — K20.90 ESOPHAGITIS: ICD-10-CM

## 2020-04-09 DIAGNOSIS — I10 ESSENTIAL HYPERTENSION: ICD-10-CM

## 2020-04-09 RX ORDER — LISINOPRIL 40 MG/1
TABLET ORAL
Qty: 90 TABLET | Refills: 1 | Status: SHIPPED | OUTPATIENT
Start: 2020-04-09 | End: 2020-04-23 | Stop reason: SDUPTHER

## 2020-04-09 RX ORDER — SIMVASTATIN 5 MG
TABLET ORAL
Qty: 90 TABLET | Refills: 1 | Status: SHIPPED | OUTPATIENT
Start: 2020-04-09 | End: 2020-04-23 | Stop reason: SDUPTHER

## 2020-04-09 RX ORDER — OMEPRAZOLE 40 MG/1
CAPSULE, DELAYED RELEASE ORAL
Qty: 90 CAPSULE | Refills: 1 | Status: SHIPPED | OUTPATIENT
Start: 2020-04-09 | End: 2020-04-23 | Stop reason: SDUPTHER

## 2020-04-09 RX ORDER — TRAZODONE HYDROCHLORIDE 100 MG/1
TABLET ORAL
Qty: 90 TABLET | Refills: 1 | Status: SHIPPED | OUTPATIENT
Start: 2020-04-09 | End: 2020-04-23 | Stop reason: SDUPTHER

## 2020-04-09 RX ORDER — HYDROCHLOROTHIAZIDE 25 MG/1
TABLET ORAL
Qty: 135 TABLET | Refills: 1 | Status: SHIPPED | OUTPATIENT
Start: 2020-04-09 | End: 2020-04-23 | Stop reason: SDUPTHER

## 2020-04-13 ENCOUNTER — APPOINTMENT (OUTPATIENT)
Dept: LAB | Facility: HOSPITAL | Age: 75
End: 2020-04-13
Payer: MEDICARE

## 2020-04-13 DIAGNOSIS — E11.29 TYPE 2 DIABETES MELLITUS WITH MICROALBUMINURIA, WITHOUT LONG-TERM CURRENT USE OF INSULIN (HCC): ICD-10-CM

## 2020-04-13 DIAGNOSIS — R80.9 TYPE 2 DIABETES MELLITUS WITH MICROALBUMINURIA, WITHOUT LONG-TERM CURRENT USE OF INSULIN (HCC): ICD-10-CM

## 2020-04-13 LAB
EST. AVERAGE GLUCOSE BLD GHB EST-MCNC: 131 MG/DL
HBA1C MFR BLD: 6.2 %

## 2020-04-13 PROCEDURE — 36415 COLL VENOUS BLD VENIPUNCTURE: CPT

## 2020-04-13 PROCEDURE — 83036 HEMOGLOBIN GLYCOSYLATED A1C: CPT

## 2020-04-22 PROBLEM — Z63.4 GRIEF AT LOSS OF CHILD: Status: RESOLVED | Noted: 2019-07-19 | Resolved: 2020-04-22

## 2020-04-22 PROBLEM — F43.21 GRIEF AT LOSS OF CHILD: Status: RESOLVED | Noted: 2019-07-19 | Resolved: 2020-04-22

## 2020-04-22 PROBLEM — F43.23 ADJUSTMENT DISORDER WITH MIXED ANXIETY AND DEPRESSED MOOD: Status: RESOLVED | Noted: 2019-07-19 | Resolved: 2020-04-22

## 2020-04-23 ENCOUNTER — TELEMEDICINE (OUTPATIENT)
Dept: FAMILY MEDICINE CLINIC | Facility: CLINIC | Age: 75
End: 2020-04-23
Payer: MEDICARE

## 2020-04-23 VITALS
SYSTOLIC BLOOD PRESSURE: 125 MMHG | WEIGHT: 182 LBS | HEART RATE: 104 BPM | TEMPERATURE: 97.4 F | DIASTOLIC BLOOD PRESSURE: 77 MMHG | BODY MASS INDEX: 29.83 KG/M2

## 2020-04-23 DIAGNOSIS — R80.9 TYPE 2 DIABETES MELLITUS WITH MICROALBUMINURIA, WITHOUT LONG-TERM CURRENT USE OF INSULIN (HCC): ICD-10-CM

## 2020-04-23 DIAGNOSIS — G47.00 INSOMNIA, UNSPECIFIED TYPE: ICD-10-CM

## 2020-04-23 DIAGNOSIS — K20.90 ESOPHAGITIS: ICD-10-CM

## 2020-04-23 DIAGNOSIS — E78.2 MIXED HYPERLIPIDEMIA: ICD-10-CM

## 2020-04-23 DIAGNOSIS — I10 ESSENTIAL HYPERTENSION: ICD-10-CM

## 2020-04-23 DIAGNOSIS — D50.9 IRON DEFICIENCY ANEMIA, UNSPECIFIED IRON DEFICIENCY ANEMIA TYPE: ICD-10-CM

## 2020-04-23 DIAGNOSIS — E55.9 VITAMIN D DEFICIENCY: ICD-10-CM

## 2020-04-23 DIAGNOSIS — R79.89 CREATININE ELEVATION: ICD-10-CM

## 2020-04-23 DIAGNOSIS — E11.29 TYPE 2 DIABETES MELLITUS WITH MICROALBUMINURIA, WITHOUT LONG-TERM CURRENT USE OF INSULIN (HCC): ICD-10-CM

## 2020-04-23 DIAGNOSIS — Z00.00 MEDICARE ANNUAL WELLNESS VISIT, SUBSEQUENT: Primary | ICD-10-CM

## 2020-04-23 DIAGNOSIS — E11.42 DIABETIC POLYNEUROPATHY ASSOCIATED WITH TYPE 2 DIABETES MELLITUS (HCC): ICD-10-CM

## 2020-04-23 DIAGNOSIS — F51.02 ADJUSTMENT INSOMNIA: ICD-10-CM

## 2020-04-23 DIAGNOSIS — K21.9 GASTROESOPHAGEAL REFLUX DISEASE WITHOUT ESOPHAGITIS: ICD-10-CM

## 2020-04-23 PROBLEM — R69 OTHER ILL-DEFINED AND UNKNOWN CAUSES OF MORBIDITY AND MORTALITY: Status: ACTIVE | Noted: 2020-04-23

## 2020-04-23 PROBLEM — H40.1190 PRIMARY OPEN-ANGLE GLAUCOMA: Status: ACTIVE | Noted: 2020-04-23

## 2020-04-23 PROBLEM — H91.90 HEARING LOSS: Status: ACTIVE | Noted: 2020-04-23

## 2020-04-23 PROBLEM — H40.1131 PRIMARY OPEN ANGLE GLAUCOMA (POAG) OF BOTH EYES, MILD STAGE: Status: ACTIVE | Noted: 2020-04-23

## 2020-04-23 PROBLEM — K57.30 DIVERTICULOSIS OF COLON: Status: ACTIVE | Noted: 2020-04-23

## 2020-04-23 PROCEDURE — 3078F DIAST BP <80 MM HG: CPT | Performed by: FAMILY MEDICINE

## 2020-04-23 PROCEDURE — 3066F NEPHROPATHY DOC TX: CPT | Performed by: FAMILY MEDICINE

## 2020-04-23 PROCEDURE — 1170F FXNL STATUS ASSESSED: CPT | Performed by: FAMILY MEDICINE

## 2020-04-23 PROCEDURE — 4010F ACE/ARB THERAPY RXD/TAKEN: CPT | Performed by: FAMILY MEDICINE

## 2020-04-23 PROCEDURE — 1125F AMNT PAIN NOTED PAIN PRSNT: CPT | Performed by: FAMILY MEDICINE

## 2020-04-23 PROCEDURE — 99214 OFFICE O/P EST MOD 30 MIN: CPT | Performed by: FAMILY MEDICINE

## 2020-04-23 PROCEDURE — 3074F SYST BP LT 130 MM HG: CPT | Performed by: FAMILY MEDICINE

## 2020-04-23 PROCEDURE — 4040F PNEUMOC VAC/ADMIN/RCVD: CPT | Performed by: FAMILY MEDICINE

## 2020-04-23 PROCEDURE — 3044F HG A1C LEVEL LT 7.0%: CPT | Performed by: FAMILY MEDICINE

## 2020-04-23 PROCEDURE — 2022F DILAT RTA XM EVC RTNOPTHY: CPT | Performed by: FAMILY MEDICINE

## 2020-04-23 PROCEDURE — 1160F RVW MEDS BY RX/DR IN RCRD: CPT | Performed by: FAMILY MEDICINE

## 2020-04-23 PROCEDURE — 1036F TOBACCO NON-USER: CPT | Performed by: FAMILY MEDICINE

## 2020-04-23 RX ORDER — LISINOPRIL 40 MG/1
40 TABLET ORAL DAILY
Qty: 90 TABLET | Refills: 1 | Status: SHIPPED | OUTPATIENT
Start: 2020-04-23 | End: 2020-09-20

## 2020-04-23 RX ORDER — HYDROCHLOROTHIAZIDE 25 MG/1
12.5 TABLET ORAL DAILY
Qty: 90 TABLET | Refills: 1 | Status: SHIPPED | OUTPATIENT
Start: 2020-04-23 | End: 2020-09-20

## 2020-04-23 RX ORDER — TRAZODONE HYDROCHLORIDE 100 MG/1
TABLET ORAL
Qty: 90 TABLET | Refills: 3 | Status: SHIPPED | OUTPATIENT
Start: 2020-04-23 | End: 2020-09-20

## 2020-04-23 RX ORDER — SIMVASTATIN 5 MG
5 TABLET ORAL
Qty: 90 TABLET | Refills: 3 | Status: SHIPPED | OUTPATIENT
Start: 2020-04-23 | End: 2020-09-20

## 2020-04-23 RX ORDER — OMEPRAZOLE 40 MG/1
40 CAPSULE, DELAYED RELEASE ORAL DAILY
Qty: 90 CAPSULE | Refills: 3 | Status: SHIPPED | OUTPATIENT
Start: 2020-04-23 | End: 2020-09-20

## 2020-06-01 ENCOUNTER — APPOINTMENT (OUTPATIENT)
Dept: RADIOLOGY | Facility: CLINIC | Age: 75
End: 2020-06-01
Payer: MEDICARE

## 2020-06-01 ENCOUNTER — OFFICE VISIT (OUTPATIENT)
Dept: FAMILY MEDICINE CLINIC | Facility: CLINIC | Age: 75
End: 2020-06-01
Payer: MEDICARE

## 2020-06-01 VITALS
HEIGHT: 65 IN | BODY MASS INDEX: 31.06 KG/M2 | WEIGHT: 186.4 LBS | TEMPERATURE: 98 F | SYSTOLIC BLOOD PRESSURE: 130 MMHG | HEART RATE: 66 BPM | DIASTOLIC BLOOD PRESSURE: 68 MMHG | RESPIRATION RATE: 15 BRPM

## 2020-06-01 DIAGNOSIS — G89.29 CHRONIC PAIN OF RIGHT KNEE: ICD-10-CM

## 2020-06-01 DIAGNOSIS — M25.561 CHRONIC PAIN OF RIGHT KNEE: Primary | ICD-10-CM

## 2020-06-01 DIAGNOSIS — E11.29 TYPE 2 DIABETES MELLITUS WITH MICROALBUMINURIA, WITHOUT LONG-TERM CURRENT USE OF INSULIN (HCC): ICD-10-CM

## 2020-06-01 DIAGNOSIS — M25.561 CHRONIC PAIN OF RIGHT KNEE: ICD-10-CM

## 2020-06-01 DIAGNOSIS — R80.9 TYPE 2 DIABETES MELLITUS WITH MICROALBUMINURIA, WITHOUT LONG-TERM CURRENT USE OF INSULIN (HCC): ICD-10-CM

## 2020-06-01 DIAGNOSIS — G89.29 CHRONIC PAIN OF RIGHT KNEE: Primary | ICD-10-CM

## 2020-06-01 PROCEDURE — 3078F DIAST BP <80 MM HG: CPT | Performed by: FAMILY MEDICINE

## 2020-06-01 PROCEDURE — 3066F NEPHROPATHY DOC TX: CPT | Performed by: FAMILY MEDICINE

## 2020-06-01 PROCEDURE — 2022F DILAT RTA XM EVC RTNOPTHY: CPT | Performed by: FAMILY MEDICINE

## 2020-06-01 PROCEDURE — 1036F TOBACCO NON-USER: CPT | Performed by: FAMILY MEDICINE

## 2020-06-01 PROCEDURE — 99214 OFFICE O/P EST MOD 30 MIN: CPT | Performed by: FAMILY MEDICINE

## 2020-06-01 PROCEDURE — 1160F RVW MEDS BY RX/DR IN RCRD: CPT | Performed by: FAMILY MEDICINE

## 2020-06-01 PROCEDURE — 3044F HG A1C LEVEL LT 7.0%: CPT | Performed by: FAMILY MEDICINE

## 2020-06-01 PROCEDURE — 73562 X-RAY EXAM OF KNEE 3: CPT

## 2020-06-01 PROCEDURE — 3075F SYST BP GE 130 - 139MM HG: CPT | Performed by: FAMILY MEDICINE

## 2020-06-01 PROCEDURE — 4040F PNEUMOC VAC/ADMIN/RCVD: CPT | Performed by: FAMILY MEDICINE

## 2020-06-01 PROCEDURE — 3008F BODY MASS INDEX DOCD: CPT | Performed by: FAMILY MEDICINE

## 2020-06-16 ENCOUNTER — OFFICE VISIT (OUTPATIENT)
Dept: OBGYN CLINIC | Facility: HOSPITAL | Age: 75
End: 2020-06-16
Payer: MEDICARE

## 2020-06-16 VITALS
HEIGHT: 65 IN | HEART RATE: 70 BPM | BODY MASS INDEX: 31.16 KG/M2 | SYSTOLIC BLOOD PRESSURE: 136 MMHG | DIASTOLIC BLOOD PRESSURE: 78 MMHG | WEIGHT: 187 LBS

## 2020-06-16 DIAGNOSIS — G89.29 CHRONIC PAIN OF RIGHT KNEE: ICD-10-CM

## 2020-06-16 DIAGNOSIS — M25.561 CHRONIC PAIN OF RIGHT KNEE: ICD-10-CM

## 2020-06-16 DIAGNOSIS — M17.11 PRIMARY OSTEOARTHRITIS OF RIGHT KNEE: Primary | ICD-10-CM

## 2020-06-16 PROCEDURE — 3044F HG A1C LEVEL LT 7.0%: CPT | Performed by: ORTHOPAEDIC SURGERY

## 2020-06-16 PROCEDURE — 1160F RVW MEDS BY RX/DR IN RCRD: CPT | Performed by: ORTHOPAEDIC SURGERY

## 2020-06-16 PROCEDURE — 3066F NEPHROPATHY DOC TX: CPT | Performed by: ORTHOPAEDIC SURGERY

## 2020-06-16 PROCEDURE — 3075F SYST BP GE 130 - 139MM HG: CPT | Performed by: ORTHOPAEDIC SURGERY

## 2020-06-16 PROCEDURE — 99213 OFFICE O/P EST LOW 20 MIN: CPT | Performed by: ORTHOPAEDIC SURGERY

## 2020-06-16 PROCEDURE — 2022F DILAT RTA XM EVC RTNOPTHY: CPT | Performed by: ORTHOPAEDIC SURGERY

## 2020-06-16 PROCEDURE — 4040F PNEUMOC VAC/ADMIN/RCVD: CPT | Performed by: ORTHOPAEDIC SURGERY

## 2020-06-16 PROCEDURE — 20610 DRAIN/INJ JOINT/BURSA W/O US: CPT | Performed by: ORTHOPAEDIC SURGERY

## 2020-06-16 PROCEDURE — 3008F BODY MASS INDEX DOCD: CPT | Performed by: ORTHOPAEDIC SURGERY

## 2020-06-16 PROCEDURE — 3078F DIAST BP <80 MM HG: CPT | Performed by: ORTHOPAEDIC SURGERY

## 2020-06-16 PROCEDURE — 1036F TOBACCO NON-USER: CPT | Performed by: ORTHOPAEDIC SURGERY

## 2020-06-16 RX ORDER — BUPIVACAINE HYDROCHLORIDE 5 MG/ML
2 INJECTION, SOLUTION EPIDURAL; INTRACAUDAL
Status: COMPLETED | OUTPATIENT
Start: 2020-06-16 | End: 2020-06-16

## 2020-06-16 RX ORDER — METHYLPREDNISOLONE ACETATE 40 MG/ML
2 INJECTION, SUSPENSION INTRA-ARTICULAR; INTRALESIONAL; INTRAMUSCULAR; SOFT TISSUE
Status: COMPLETED | OUTPATIENT
Start: 2020-06-16 | End: 2020-06-16

## 2020-06-16 RX ADMIN — METHYLPREDNISOLONE ACETATE 2 ML: 40 INJECTION, SUSPENSION INTRA-ARTICULAR; INTRALESIONAL; INTRAMUSCULAR; SOFT TISSUE at 08:23

## 2020-06-16 RX ADMIN — BUPIVACAINE HYDROCHLORIDE 2 ML: 5 INJECTION, SOLUTION EPIDURAL; INTRACAUDAL at 08:23

## 2020-07-20 ENCOUNTER — APPOINTMENT (OUTPATIENT)
Dept: LAB | Facility: HOSPITAL | Age: 75
End: 2020-07-20
Payer: MEDICARE

## 2020-07-20 DIAGNOSIS — R80.9 TYPE 2 DIABETES MELLITUS WITH MICROALBUMINURIA, WITHOUT LONG-TERM CURRENT USE OF INSULIN (HCC): ICD-10-CM

## 2020-07-20 DIAGNOSIS — E11.29 TYPE 2 DIABETES MELLITUS WITH MICROALBUMINURIA, WITHOUT LONG-TERM CURRENT USE OF INSULIN (HCC): ICD-10-CM

## 2020-07-20 DIAGNOSIS — D50.9 IRON DEFICIENCY ANEMIA, UNSPECIFIED IRON DEFICIENCY ANEMIA TYPE: ICD-10-CM

## 2020-07-20 LAB
ANION GAP SERPL CALCULATED.3IONS-SCNC: 7 MMOL/L (ref 4–13)
BASOPHILS # BLD AUTO: 0.03 THOUSANDS/ΜL (ref 0–0.1)
BASOPHILS NFR BLD AUTO: 1 % (ref 0–1)
BUN SERPL-MCNC: 20 MG/DL (ref 5–25)
CALCIUM SERPL-MCNC: 9 MG/DL (ref 8.3–10.1)
CHLORIDE SERPL-SCNC: 107 MMOL/L (ref 100–108)
CO2 SERPL-SCNC: 26 MMOL/L (ref 21–32)
CREAT SERPL-MCNC: 1.19 MG/DL (ref 0.6–1.3)
EOSINOPHIL # BLD AUTO: 0.2 THOUSAND/ΜL (ref 0–0.61)
EOSINOPHIL NFR BLD AUTO: 3 % (ref 0–6)
ERYTHROCYTE [DISTWIDTH] IN BLOOD BY AUTOMATED COUNT: 15.4 % (ref 11.6–15.1)
EST. AVERAGE GLUCOSE BLD GHB EST-MCNC: 131 MG/DL
FERRITIN SERPL-MCNC: 24 NG/ML (ref 8–388)
GFR SERPL CREATININE-BSD FRML MDRD: 60 ML/MIN/1.73SQ M
GLUCOSE P FAST SERPL-MCNC: 135 MG/DL (ref 65–99)
HBA1C MFR BLD: 6.2 %
HCT VFR BLD AUTO: 38.7 % (ref 36.5–49.3)
HGB BLD-MCNC: 12.6 G/DL (ref 12–17)
IMM GRANULOCYTES # BLD AUTO: 0.02 THOUSAND/UL (ref 0–0.2)
IMM GRANULOCYTES NFR BLD AUTO: 0 % (ref 0–2)
IRON SATN MFR SERPL: 16 %
IRON SERPL-MCNC: 55 UG/DL (ref 65–175)
LYMPHOCYTES # BLD AUTO: 1.55 THOUSANDS/ΜL (ref 0.6–4.47)
LYMPHOCYTES NFR BLD AUTO: 25 % (ref 14–44)
MCH RBC QN AUTO: 30.1 PG (ref 26.8–34.3)
MCHC RBC AUTO-ENTMCNC: 32.6 G/DL (ref 31.4–37.4)
MCV RBC AUTO: 92 FL (ref 82–98)
MONOCYTES # BLD AUTO: 0.86 THOUSAND/ΜL (ref 0.17–1.22)
MONOCYTES NFR BLD AUTO: 14 % (ref 4–12)
NEUTROPHILS # BLD AUTO: 3.65 THOUSANDS/ΜL (ref 1.85–7.62)
NEUTS SEG NFR BLD AUTO: 57 % (ref 43–75)
NRBC BLD AUTO-RTO: 0 /100 WBCS
PLATELET # BLD AUTO: 270 THOUSANDS/UL (ref 149–390)
PMV BLD AUTO: 10.8 FL (ref 8.9–12.7)
POTASSIUM SERPL-SCNC: 4 MMOL/L (ref 3.5–5.3)
RBC # BLD AUTO: 4.19 MILLION/UL (ref 3.88–5.62)
SODIUM SERPL-SCNC: 140 MMOL/L (ref 136–145)
TIBC SERPL-MCNC: 339 UG/DL (ref 250–450)
WBC # BLD AUTO: 6.31 THOUSAND/UL (ref 4.31–10.16)

## 2020-07-20 PROCEDURE — 83036 HEMOGLOBIN GLYCOSYLATED A1C: CPT

## 2020-07-20 PROCEDURE — 83540 ASSAY OF IRON: CPT

## 2020-07-20 PROCEDURE — 85025 COMPLETE CBC W/AUTO DIFF WBC: CPT

## 2020-07-20 PROCEDURE — 36415 COLL VENOUS BLD VENIPUNCTURE: CPT

## 2020-07-20 PROCEDURE — 80048 BASIC METABOLIC PNL TOTAL CA: CPT

## 2020-07-20 PROCEDURE — 82728 ASSAY OF FERRITIN: CPT

## 2020-07-20 PROCEDURE — 83550 IRON BINDING TEST: CPT

## 2020-07-23 NOTE — PROGRESS NOTES
ASSESSMENT/PLAN:    Skin neoplasm/possible squamous cell cancer  Patient sent to Dr Leslye Reyes for removal    Knee pain right  Helped a lot with injection through ortho will follow-up at 3 months    Iron deficiency anemia  Hemoglobin went up to 12 6 from 11 7  Patient's iron is low at 55 as ferritin is on the low side at 24  Patient will try to eat liver once every 1 or 2 weeks and continue deep beef to try to get this to go up  He had a colonoscopy 2018 in this was totally clear  As long as his hemoglobin stays in normal range, he is okay  Must pay attention to when patient is donating blood    Type 2 diabetes  A1c study at 6 2  This is with lifestyle change only    Diabetic nephropathy  Creatinine now normal at 1 19, it had been as high as 1 39 in the past  GFR is normal at 60  We will check this before next visit    Esophageal reflux  Only omeprazole works      Hyperlipidemia   Cholesterol values excellent with simvastatin, continue the same       Hypertension   Blood pressure is good with lisinopril HCTZ, continue the same            Recheck in December  A1c microalbumin in full screening labs  Recheck sooner if needed    Full physical done this visit                Health Maintenance   Topic Date Due    Hepatitis C Screening  1945    SLP PLAN OF CARE  1945    Medicare Annual Wellness Visit (AWV)  03/01/2020    Influenza Vaccine  07/01/2020    HEMOGLOBIN A1C  01/20/2021    BMI: Followup Plan  04/23/2021    Fall Risk  06/01/2021    Depression Screening PHQ  06/01/2021    Diabetic Foot Exam  06/01/2021    BMI: Adult  07/27/2021    DM Eye Exam  01/07/2022    CRC Screening: Colonoscopy  07/24/2023    DTaP,Tdap,and Td Vaccines (5 - Td) 07/17/2029    Pneumococcal Vaccine: 65+ Years  Completed    Pneumococcal Vaccine: Pediatrics (0 to 5 Years) and At-Risk Patients (6 to 59 Years)  Aged Out    HIB Vaccine  Aged Out    Hepatitis B Vaccine  Aged Out    IPV Vaccine  Aged Out    Hepatitis A Vaccine  Aged Out    Meningococcal ACWY Vaccine  Aged Out    HPV Vaccine  Aged Out         Problem List as of 7/27/2020 Reviewed: 6/16/2020  8:39 AM by Zac Carter MD    Actinic keratosis    Adjustment insomnia    Allergic rhinitis    Creatinine elevation    De Quervain's tenosynovitis, right    Diabetic polyneuropathy associated with type 2 diabetes mellitus ([address] )    Diverticulosis of colon    Elastosis of skin    Essential hypertension    Gastroesophageal reflux disease without esophagitis    Hearing loss    Hiatal hernia    Iron deficiency anemia    Mixed hyperlipidemia    Primary open angle glaucoma (POAG) of both eyes, mild stage    Tinnitus    Trigger middle finger of right hand    Type 2 diabetes mellitus with microalbuminuria, without long-term current use of insulin ([facility])    Vitamin D deficiency            Subjective:   Chief Complaint   Patient presents with    Heartburn    Diabetes    Hypertension    Vitamin D Deficiency     Patient is here for recheck  He got blood work done for us especially his iron studies  He try to take some iron pills in his stools became black so he stopped    He does eat meat and a eggs  He did try to give blood and they refused because his iron was low    Patient did see Orthopedics and had his knee injected which helped a lot      patient ID: Twyla Rader is a 76 y o  male      Past Medical History:   Diagnosis Date    Abnormal glucose     last assessed 30Nov2012    Anemia     last assessed [id number]    Benign neoplasm of descending colon 12/01/2009    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    unspecified laterality    Diverticulitis of colon 02/22/2011    Ganglion     last assessed [id number]    Gastroenteritis     last assessed [id number]    Glucose intolerance     last assessed [id number]    Hyperesthesia 11/30/2010    Rectal hemorrhage 06/01/2010     Past Surgical History:   Procedure Laterality Date    ADENOIDECTOMY  01/01/1950    [facility] BREAST BIOPSY Left 1980    L breast did bx     NECK SURGERY  1950    neck gland removed as child [address]   1950     Family History   Problem Relation Age of Onset    Alzheimer's disease Mother     Heart attack Father     Alcohol abuse Neg Hx     Substance Abuse Neg Hx      Social History     Tobacco Use    Smoking status: Former Smoker     Last attempt to quit:      Years since quittin 5    Smokeless tobacco: Never Used    Tobacco comment: quit in [id number]   Substance Use Topics    Alcohol use: Yes     Comment: social    Drug use: No     Social History     Tobacco Use   Smoking Status Former Smoker    Last attempt to quit:     Years since quittin 5   Smokeless Tobacco Never Used   Tobacco Comment    quit in [id number]        MED LIST WAS REVIEWED AND UPDATED       ROS  As per HPI  Rest of 12 point review of systems negative     Objective:      VITALS:  Wt Readings from Last 3 Encounters:   20 84 1 kg (185 lb 8 oz)   20 84 8 kg (187 lb)   20 84 6 kg (186 lb 6 4 oz)     BP Readings from Last 3 Encounters:   20 126/74   20 136/78   20 130/68     Pulse Readings from Last 3 Encounters:   20 68   20 70   20 66     Body mass index is 30 63 kg/m²  Laboratory Results:    All pertinent labs and studies were reviewed with patient  during this office visit  including the following:    Lab Results   Component Value Date    WBC 6 31 2020    WBC 6 47 12/10/2019    WBC 6 91 2018    HGB 12 6 2020    HGB 11 7 (L) 12/10/2019    HGB 12 9 2018    HCT 38 7 2020    HCT 36 8 12/10/2019    HCT 38 8 2018    MCV 92 2020    MCV 89 12/10/2019    MCV 94 2018     2020     12/10/2019     2018     Lab Results   Component Value Date     08/10/2015     2014    K 4 0 2020    K 3 8 12/10/2019    K 3 8 2019     07/20/2020     (H) 12/10/2019     09/12/2019    CO2 26 07/20/2020    CO2 26 12/10/2019    CO2 24 09/12/2019    BUN 20 07/20/2020    BUN 18 12/10/2019    BUN 22 09/12/2019     Lab Results   Component Value Date    GLUCOSE 132 08/10/2015    ALT 33 12/10/2019    AST 18 12/10/2019    ALKPHOS 71 12/10/2019    EGFR 60 07/20/2020    CALCIUM 9 0 07/20/2020     No results found for: TSHRFT4        Lipid Profile:   Lab Results   Component Value Date    CHOL 125 08/10/2015    CHOL 152 02/10/2015    CHOL 185 07/02/2014     Lab Results   Component Value Date    HDL 29 (L) 12/10/2019    HDL 32 (L) 11/19/2018    HDL 36 (L) 11/20/2017     Lab Results   Component Value Date    LDLCALC 72 12/10/2019    LDLCALC 84 11/19/2018    LDLCALC 89 11/20/2017     Lab Results   Component Value Date    TRIG 253 (H) 12/10/2019    TRIG 202 (H) 11/19/2018    TRIG 196 (H) 11/20/2017       Diabetic labs (if applicable)  Lab Results   Component Value Date    HGBA1C 6 2 (H) 07/20/2020    HGBA1C 6 2 (H) 04/13/2020    HGBA1C 6 5 (H) 12/10/2019     Lab Results   Component Value Date    GLUF 135 (H) 07/20/2020    LDLCALC 72 12/10/2019    CREATININE 1 19 07/20/2020          Physical Exam    Gen    No acute distress well-appearing well-nourished appears stated age    Mental status  Good judgment and insight oriented to time person and place, recent and remote memory intact mood and affect normal cooperative and patient is reasonable    HEENT  PERRLA 3 mm, EOMI without nystagmus, normocephalic atraumatic without facial weakness      Neck   supple no masses trachea midline positive click normal carotid upstrokes with no bruits    Cor  Regular rhythm without ectopy or murmur, no S3-S4, normal palpation that is no heave lift or thrill    Vascular  No edema, good pedal pulses    Lungs  CTA bilaterally in no respiratory distress no wheezes rhonchi or rales, normal to palpation no tactile fremitus    Abdomen  Soft, no palpable masses, no hepatosplenomegaly, normal bowel sounds, nontender    Lymphatics  No palpable nodes in the neck, supraclavicular area, axilla, or groin     Musculoskeletal  No clubbing cyanosis or edema muscle tone normal    Skin  Left upper arm is a lesion that is stellate with the main body of it being 3 or 4 mm hyperkeratotic on a red base  There are 2 extension sec 6:00 a m  And 9:00 a m  From this based    Neuro  Normal ambulation, cranial nerves 2-12 grossly intact, higher functioning with reasoning intact

## 2020-07-27 ENCOUNTER — OFFICE VISIT (OUTPATIENT)
Dept: FAMILY MEDICINE CLINIC | Facility: CLINIC | Age: 75
End: 2020-07-27
Payer: MEDICARE

## 2020-07-27 VITALS
RESPIRATION RATE: 15 BRPM | WEIGHT: 185.5 LBS | HEIGHT: 65 IN | TEMPERATURE: 97.9 F | DIASTOLIC BLOOD PRESSURE: 74 MMHG | BODY MASS INDEX: 30.91 KG/M2 | SYSTOLIC BLOOD PRESSURE: 126 MMHG | HEART RATE: 68 BPM

## 2020-07-27 DIAGNOSIS — J30.9 ALLERGIC RHINITIS, UNSPECIFIED SEASONALITY, UNSPECIFIED TRIGGER: ICD-10-CM

## 2020-07-27 DIAGNOSIS — F51.02 ADJUSTMENT INSOMNIA: ICD-10-CM

## 2020-07-27 DIAGNOSIS — E11.42 DIABETIC POLYNEUROPATHY ASSOCIATED WITH TYPE 2 DIABETES MELLITUS (HCC): ICD-10-CM

## 2020-07-27 DIAGNOSIS — E11.29 TYPE 2 DIABETES MELLITUS WITH MICROALBUMINURIA, WITHOUT LONG-TERM CURRENT USE OF INSULIN (HCC): ICD-10-CM

## 2020-07-27 DIAGNOSIS — I10 ESSENTIAL HYPERTENSION: ICD-10-CM

## 2020-07-27 DIAGNOSIS — Z12.5 PROSTATE CANCER SCREENING: ICD-10-CM

## 2020-07-27 DIAGNOSIS — E55.9 VITAMIN D DEFICIENCY: ICD-10-CM

## 2020-07-27 DIAGNOSIS — K21.9 GASTROESOPHAGEAL REFLUX DISEASE WITHOUT ESOPHAGITIS: ICD-10-CM

## 2020-07-27 DIAGNOSIS — M65.331 TRIGGER MIDDLE FINGER OF RIGHT HAND: ICD-10-CM

## 2020-07-27 DIAGNOSIS — E78.2 MIXED HYPERLIPIDEMIA: ICD-10-CM

## 2020-07-27 DIAGNOSIS — R79.89 CREATININE ELEVATION: ICD-10-CM

## 2020-07-27 DIAGNOSIS — D49.2 SKIN NEOPLASM: ICD-10-CM

## 2020-07-27 DIAGNOSIS — D50.9 IRON DEFICIENCY ANEMIA, UNSPECIFIED IRON DEFICIENCY ANEMIA TYPE: ICD-10-CM

## 2020-07-27 DIAGNOSIS — R80.9 TYPE 2 DIABETES MELLITUS WITH MICROALBUMINURIA, WITHOUT LONG-TERM CURRENT USE OF INSULIN (HCC): ICD-10-CM

## 2020-07-27 DIAGNOSIS — Z00.00 MEDICARE ANNUAL WELLNESS VISIT, SUBSEQUENT: Primary | ICD-10-CM

## 2020-07-27 PROCEDURE — 4040F PNEUMOC VAC/ADMIN/RCVD: CPT | Performed by: FAMILY MEDICINE

## 2020-07-27 PROCEDURE — 1125F AMNT PAIN NOTED PAIN PRSNT: CPT | Performed by: FAMILY MEDICINE

## 2020-07-27 PROCEDURE — 2022F DILAT RTA XM EVC RTNOPTHY: CPT | Performed by: FAMILY MEDICINE

## 2020-07-27 PROCEDURE — 3044F HG A1C LEVEL LT 7.0%: CPT | Performed by: FAMILY MEDICINE

## 2020-07-27 PROCEDURE — 3078F DIAST BP <80 MM HG: CPT | Performed by: FAMILY MEDICINE

## 2020-07-27 PROCEDURE — 99214 OFFICE O/P EST MOD 30 MIN: CPT | Performed by: FAMILY MEDICINE

## 2020-07-27 PROCEDURE — 1160F RVW MEDS BY RX/DR IN RCRD: CPT | Performed by: FAMILY MEDICINE

## 2020-07-27 PROCEDURE — 1170F FXNL STATUS ASSESSED: CPT | Performed by: FAMILY MEDICINE

## 2020-07-27 PROCEDURE — 3074F SYST BP LT 130 MM HG: CPT | Performed by: FAMILY MEDICINE

## 2020-07-27 PROCEDURE — 3066F NEPHROPATHY DOC TX: CPT | Performed by: FAMILY MEDICINE

## 2020-07-27 PROCEDURE — G0439 PPPS, SUBSEQ VISIT: HCPCS | Performed by: FAMILY MEDICINE

## 2020-07-27 PROCEDURE — 1123F ACP DISCUSS/DSCN MKR DOCD: CPT | Performed by: FAMILY MEDICINE

## 2020-07-27 PROCEDURE — 1036F TOBACCO NON-USER: CPT | Performed by: FAMILY MEDICINE

## 2020-07-27 PROCEDURE — 3008F BODY MASS INDEX DOCD: CPT | Performed by: FAMILY MEDICINE

## 2020-07-27 NOTE — PROGRESS NOTES
Assessment and Plan:   Everything is up-to-date  Problem List Items Addressed This Visit        Digestive    Gastroesophageal reflux disease without esophagitis       Endocrine    Type 2 diabetes mellitus with microalbuminuria, without long-term current use of insulin ([address] ) - Primary    Diabetic polyneuropathy associated with type 2 diabetes mellitus (HCC)       Respiratory    Allergic rhinitis       Cardiovascular and Mediastinum    Essential hypertension       Musculoskeletal and Integument    Trigger middle finger of right hand       Other    Creatinine elevation    Mixed hyperlipidemia    Adjustment insomnia    Vitamin D deficiency    Iron deficiency anemia           Preventive health issues were discussed with patient, and age appropriate screening tests were ordered as noted in patient's After Visit Summary  Personalized health advice and appropriate referrals for health education or preventive services given if needed, as noted in patient's After Visit Summary       History of Present Illness:     Patient presents for Medicare Annual Wellness visit    Patient Care Team:  Claudio Veliz DO as PCP - General     Problem List:     Patient Active Problem List   Diagnosis    Actinic keratosis    Allergic rhinitis    Creatinine elevation    De Quervain's tenosynovitis, right    Elastosis of skin    Gastroesophageal reflux disease without esophagitis    Hiatal hernia    Mixed hyperlipidemia    Essential hypertension    Tinnitus    Adjustment insomnia    Trigger middle finger of right hand    Vitamin D deficiency    Type 2 diabetes mellitus with microalbuminuria, without long-term current use of insulin ([address] )    Diabetic polyneuropathy associated with type 2 diabetes mellitus ([address] )    Primary open angle glaucoma (POAG) of both eyes, mild stage    Diverticulosis of colon    Hearing loss    Iron deficiency anemia      Past Medical and Surgical History:     Past Medical History:   Diagnosis Date    Abnormal glucose     last assessed 2012    Anemia     last assessed [id number]    Benign neoplasm of descending colon 2009    Chondromalacia of patella 2011    unspecified laterality    Diverticulitis of colon 2011    Ganglion     last assessed [id number]    Gastroenteritis     last assessed [id number]    Glucose intolerance     last assessed [id number]    Hyperesthesia 2010    Rectal hemorrhage 2010     Past Surgical History:   Procedure Laterality Date    ADENOIDECTOMY  1950    INCISIONAL BREAST BIOPSY Left 1980    L breast did bx     NECK SURGERY  1950    neck gland removed as child [address]   1950      Family History:     Family History   Problem Relation Age of Onset    Alzheimer's disease Mother     Heart attack Father     Alcohol abuse Neg Hx     Substance Abuse Neg Hx       Social History:        Social History     Socioeconomic History    Marital status: /Civil Union     Spouse name: None    Number of children: None    Years of education: None    Highest education level: None   Occupational History    None   Social Needs    Financial resource strain: None    Food insecurity:     Worry: None     Inability: None    Transportation needs:     Medical: None     Non-medical: None   Tobacco Use    Smoking status: Former Smoker     Last attempt to quit: 1980     Years since quittin 5    Smokeless tobacco: Never Used    Tobacco comment: quit in [id number]   Substance and Sexual Activity    Alcohol use: Yes     Comment: social    Drug use: No    Sexual activity: None   Lifestyle    Physical activity:     Days per week: None     Minutes per session: None    Stress: None   Relationships    Social connections:     Talks on phone: None     Gets together: None     Attends Restoration service: None     Active member of club or organization: None     Attends meetings of clubs or organizations: None     Relationship status: None    Intimate partner violence:     Fear of current or ex partner: None     Emotionally abused: None     Physically abused: None     Forced sexual activity: None   Other Topics Concern    None   Social History Narrative    Active directive in chart    Always uses seatbelt    Drinks caffeinated tea, 4-6 cups hot tea/day      Medications and Allergies:     Current Outpatient Medications   Medication Sig Dispense Refill    Alpha-Lipoic Acid 600 MG CAPS Take by mouth      Ascorbic Acid (VITAMIN C) 100 MG tablet Take 1 tablet by mouth daily      aspirin 81 mg chewable tablet Chew 1 tablet daily      Cholecalciferol (VITAMIN D3) 400 units CAPS Take by mouth      glucose blood (WESLY CONTOUR NEXT TEST) test strip 1 each by Other route as needed (Use once daily) 100 each 3    hydrochlorothiazide (HYDRODIURIL) 25 mg tablet Take 0 5 tablets (12 5 mg total) by mouth daily 90 tablet 1    lisinopril (ZESTRIL) 40 mg tablet Take 1 tablet (40 mg total) by mouth daily 90 tablet 1    MICROLET LANCETS MISC Test sugars twice a day 100 each 3    multivitamin-minerals (CENTRUM ADULTS) tablet Take 1 tablet by mouth daily      omeprazole (PriLOSEC) 40 MG capsule Take 1 capsule (40 mg total) by mouth daily 90 capsule 3    simvastatin (ZOCOR) 5 MG tablet Take 1 tablet (5 mg total) by mouth daily at bedtime 90 tablet 3    traZODone (DESYREL) 100 mg tablet TAKE 1/2 TO 1 TABLET BY  MOUTH AT NIGHT FOR SLEEP 90 tablet 3     No current facility-administered medications for this visit  Allergies   Allergen Reactions    Iodine      Other reaction(s): was told by nurse: heat rash?       Immunizations:     Immunization History   Administered Date(s) Administered    DTaP / HiB / IPV 08/28/2006    INFLUENZA 11/26/2008, 12/01/2008, 10/13/2009, 10/01/2010, 10/01/2013, 10/01/2015, 09/26/2016, 09/26/2016, 09/29/2017    Influenza Split High Dose Preservative Free IM 09/06/2013, 09/15/2014, 09/28/2015, 10/01/2016, 09/29/2017    Influenza TIV (IM) 09/07/2011, 09/21/2012    Influenza, high dose seasonal 0 5 mL 09/21/2018, 09/20/2019    Lyme Disease 06/26/2001    Pneumococcal 11/01/2007    Pneumococcal Conjugate 13-Valent 02/18/2016, 10/01/2016    Pneumococcal Polysaccharide PPV23 10/01/2007, 11/01/2007, 11/30/2012, 04/09/2018    Tdap 02/11/2003, 06/02/2012, 07/17/2019    Zoster 09/27/2012, 10/01/2013    Zoster Vaccine Recombinant 03/14/2019, 07/17/2019      Health Maintenance:         Topic Date Due    Hepatitis C Screening  1945    CRC Screening: Colonoscopy  07/24/2023         Topic Date Due    Influenza Vaccine  07/01/2020      Medicare Health Risk Assessment:     /74 (BP Location: Left arm, Patient Position: Sitting, Cuff Size: Standard)   Pulse 68   Temp 97 9 °F (36 6 °C) (Temporal)   Resp 15   Ht 5' 5 25" (1 657 m)   Wt 84 1 kg (185 lb 8 oz)   BMI 30 63 kg/m²      Oma is here for his Subsequent Wellness visit  Health Risk Assessment:   Patient rates overall health as good  Patient feels that their physical health rating is same  Eyesight was rated as same  Hearing was rated as same  Patient feels that their emotional and mental health rating is same  Pain experienced in the last 7 days has been none  Patient states that he has experienced no weight loss or gain in last 6 months  Fall Risk Screening: In the past year, patient has experienced: no history of falling in past year      Home Safety:  Patient has trouble with stairs inside or outside of their home  Patient has working smoke alarms and has working carbon monoxide detector  Home safety hazards include: none  TROUBLE WITH STEPS IS ONLY SECONDARY TO ARTHRITIS AND KNEE  Nutrition:   Current diet is Regular and Limited junk food  Medications:   Patient is not currently taking any over-the-counter supplements  Patient is able to manage medications       Activities of Daily Living (ADLs)/Instrumental Activities of Daily Living (IADLs):   Walk and transfer into and out of bed and chair?: Yes  Dress and groom yourself?: Yes    Bathe or shower yourself?: Yes    Feed yourself? Yes  Do your laundry/housekeeping?: Yes  Manage your money, pay your bills and track your expenses?: Yes  Make your own meals?: Yes    Do your own shopping?: Yes    Previous Hospitalizations:   Any hospitalizations or ED visits within the last 12 months?: No      Advance Care Planning:   Living will: Yes    Advanced directive: Yes    End of Life Decisions reviewed with patient: Yes      Cognitive Screening:   Provider or family/friend/caregiver concerned regarding cognition?: No    PREVENTIVE SCREENINGS      Cardiovascular Screening:    General: Screening Not Indicated and History Lipid Disorder      Diabetes Screening:     General: Screening Not Indicated and History Diabetes      Colorectal Cancer Screening:     General: Screening Current      Prostate Cancer Screening:    General: Screening Current      Osteoporosis Screening:    General: Screening Not Indicated      Abdominal Aortic Aneurysm (AAA) Screening:    Risk factors include: age between 73-67 yo and tobacco use        General: Screening Current      Lung Cancer Screening:     General: Screening Not Indicated      Hepatitis C Screening:    General: Screening Not Indicated    Other Counseling Topics:   Calcium and vitamin D intake and regular weightbearing exercise         Radha Barron DO

## 2020-07-27 NOTE — PATIENT INSTRUCTIONS
1  Please see Dr Mario Landry for the lesion on her arm to have that removed  2  Please follow-up with Dr Gara Hodgkins for your knee in 3 months    3  Regarding her iron please try to eat liver once every 1-2 weeks and continue eating beef  Please do not give any blood for donation because you will further rule when your iron store at this point              Medicare Preventive Visit Patient Instructions  Thank you for completing your Welcome to Medicare Visit or Medicare Annual Wellness Visit today  Your next wellness visit will be due in one year (7/27/2021)  The screening/preventive services that you may require over the next 5-10 years are detailed below  Some tests may not apply to you based off risk factors and/or age  Screening tests ordered at today's visit but not completed yet may show as past due  Also, please note that scanned in results may not display below  Preventive Screenings:  Service Recommendations Previous Testing/Comments   Colorectal Cancer Screening  · Colonoscopy    · Fecal Occult Blood Test (FOBT)/Fecal Immunochemical Test (FIT)  · Fecal DNA/Cologuard Test  · Flexible Sigmoidoscopy Age: 54-65 years old   Colonoscopy: every 10 years (May be performed more frequently if at higher risk)  OR  FOBT/FIT: every 1 year  OR  Cologuard: every 3 years  OR  Sigmoidoscopy: every 5 years  Screening may be recommended earlier than age 48 if at higher risk for colorectal cancer  Also, an individualized decision between you and your healthcare provider will decide whether screening between the ages of 74-80 would be appropriate   Colonoscopy: 07/24/2018  FOBT/FIT: Not on file  Cologuard: Not on file  Sigmoidoscopy: Not on file    Screening Current     Prostate Cancer Screening Individualized decision between patient and health care provider in men between ages of 53-78   Medicare will cover every 12 months beginning on the day after your 50th birthday PSA: 0 8 ng/mL     Screening Current     Hepatitis C Screening Once for adults born between 1945 and 1965  More frequently in patients at high risk for Hepatitis C Hep C Antibody: Not on file       Diabetes Screening 1-2 times per year if you're at risk for diabetes or have pre-diabetes Fasting glucose: 135 mg/dL   A1C: 6 2 %    Screening Not Indicated  History Diabetes   Cholesterol Screening Once every 5 years if you don't have a lipid disorder  May order more often based on risk factors  Lipid panel: 12/10/2019    Screening Not Indicated  History Lipid Disorder      Other Preventive Screenings Covered by Medicare:  1  Abdominal Aortic Aneurysm (AAA) Screening: covered once if your at risk  You're considered to be at risk if you have a family history of AAA or a male between the age of 73-68 who smoking at least 100 cigarettes in your lifetime  2  Lung Cancer Screening: covers low dose CT scan once per year if you meet all of the following conditions: (1) Age 50-69; (2) No signs or symptoms of lung cancer; (3) Current smoker or have quit smoking within the last 15 years; (4) You have a tobacco smoking history of at least 30 pack years (packs per day x number of years you smoked); (5) You get a written order from a healthcare provider  3  Glaucoma Screening: covered annually if you're considered high risk: (1) You have diabetes OR (2) Family history of glaucoma OR (3)  aged 48 and older OR (3)  American aged 72 and older  3  Osteoporosis Screening: covered every 2 years if you meet one of the following conditions: (1) Have a vertebral abnormality; (2) On glucocorticoid therapy for more than 3 months; (3) Have primary hyperparathyroidism; (4) On osteoporosis medications and need to assess response to drug therapy  5  HIV Screening: covered annually if you're between the age of 12-76  Also covered annually if you are younger than 13 and older than 72 with risk factors for HIV infection   For pregnant patients, it is covered up to 3 times per pregnancy  Immunizations:  Immunization Recommendations   Influenza Vaccine Annual influenza vaccination during flu season is recommended for all persons aged >= 6 months who do not have contraindications   Pneumococcal Vaccine (Prevnar and Pneumovax)  * Prevnar = PCV13  * Pneumovax = PPSV23 Adults 25-60 years old: 1-3 doses may be recommended based on certain risk factors  Adults 72 years old: Prevnar (PCV13) vaccine recommended followed by Pneumovax (PPSV23) vaccine  If already received PPSV23 since turning 65, then PCV13 recommended at least one year after PPSV23 dose  Hepatitis B Vaccine 3 dose series if at intermediate or high risk (ex: diabetes, end stage renal disease, liver disease)   Tetanus (Td) Vaccine - COST NOT COVERED BY MEDICARE PART B Following completion of primary series, a booster dose should be given every 10 years to maintain immunity against tetanus  Td may also be given as tetanus wound prophylaxis  Tdap Vaccine - COST NOT COVERED BY MEDICARE PART B Recommended at least once for all adults  For pregnant patients, recommended with each pregnancy  Shingles Vaccine (Shingrix) - COST NOT COVERED BY MEDICARE PART B  2 shot series recommended in those aged 48 and above     Health Maintenance Due:      Topic Date Due    Hepatitis C Screening  1945    CRC Screening: Colonoscopy  07/24/2023     Immunizations Due:      Topic Date Due    Influenza Vaccine  07/01/2020     Advance Directives   What are advance directives? Advance directives are legal documents that state your wishes and plans for medical care  These plans are made ahead of time in case you lose your ability to make decisions for yourself  Advance directives can apply to any medical decision, such as the treatments you want, and if you want to donate organs  What are the types of advance directives? There are many types of advance directives, and each state has rules about how to use them   You may choose a combination of any of the following:  · Living will: This is a written record of the treatment you want  You can also choose which treatments you do not want, which to limit, and which to stop at a certain time  This includes surgery, medicine, IV fluid, and tube feedings  · Durable power of  for healthcare [place] [facility]): This is a written record that states who you want to make healthcare choices for you when you are unable to make them for yourself  This person, called a proxy, is usually a family member or a friend  You may choose more than 1 proxy  · Do not resuscitate (DNR) order:  A DNR order is used in case your heart stops beating or you stop breathing  It is a request not to have certain forms of treatment, such as CPR  A DNR order may be included in other types of advance directives  · Medical directive: This covers the care that you want if you are in a coma, near death, or unable to make decisions for yourself  You can list the treatments you want for each condition  Treatment may include pain medicine, surgery, blood transfusions, dialysis, IV or tube feedings, and a ventilator (breathing machine)  · Values history: This document has questions about your views, beliefs, and how you feel and think about life  This information can help others choose the care that you would choose  Why are advance directives important? An advance directive helps you control your care  Although spoken wishes may be used, it is better to have your wishes written down  Spoken wishes can be misunderstood, or not followed  Treatments may be given even if you do not want them  An advance directive may make it easier for your family to make difficult choices about your care  Weight Management   Why it is important to manage your weight:  Being overweight increases your risk of health conditions such as heart disease, high blood pressure, type 2 diabetes, and certain types of cancer   It can also increase your risk for osteoarthritis, sleep apnea, and other respiratory problems  Aim for a slow, steady weight loss  Even a small amount of weight loss can lower your risk of health problems  How to lose weight safely:  A safe and healthy way to lose weight is to eat fewer calories and get regular exercise  You can lose up about 1 pound a week by decreasing the number of calories you eat by 500 calories each day  Healthy meal plan for weight management:  A healthy meal plan includes a variety of foods, contains fewer calories, and helps you stay healthy  A healthy meal plan includes the following:  · Eat whole-grain foods more often  A healthy meal plan should contain fiber  Fiber is the part of grains, fruits, and vegetables that is not broken down by your body  Whole-grain foods are healthy and provide extra fiber in your diet  Some examples of whole-grain foods are whole-wheat breads and pastas, oatmeal, brown rice, and bulgur  · Eat a variety of vegetables every day  Include dark, leafy greens such as spinach, kale, tayla greens, and mustard greens  Eat yellow and orange vegetables such as carrots, sweet potatoes, and winter squash  · Eat a variety of fruits every day  Choose fresh or canned fruit (canned in its own juice or light syrup) instead of juice  Fruit juice has very little or no fiber  · Eat low-fat dairy foods  Drink fat-free (skim) milk or 1% milk  Eat fat-free yogurt and low-fat cottage cheese  Try low-fat cheeses such as mozzarella and other reduced-fat cheeses  · Choose meat and other protein foods that are low in fat  Choose beans or other legumes such as split peas or lentils  Choose fish, skinless poultry (chicken or turkey), or lean cuts of red meat (beef or pork)  Before you cook meat or poultry, cut off any visible fat  · Use less fat and oil  Try baking foods instead of frying them  Add less fat, such as margarine, sour cream, regular salad dressing and mayonnaise to foods   Eat fewer high-fat foods  Some examples of high-fat foods include french fries, doughnuts, ice cream, and cakes  · Eat fewer sweets  Limit foods and drinks that are high in sugar  This includes candy, cookies, regular soda, and sweetened drinks  Exercise:  Exercise at least 30 minutes per day on most days of the week  Some examples of exercise include walking, biking, dancing, and swimming  You can also fit in more physical activity by taking the stairs instead of the elevator or parking farther away from stores  Ask your healthcare provider about the best exercise plan for you  © Copyright [employer] 2018 Information is for End User's use only and may not be sold, redistributed or otherwise used for commercial purposes   All illustrations and images included in CareNotes® are the copyrighted property of A D A M , Inc  or [address] [place] [address]

## 2020-08-05 ENCOUNTER — TELEPHONE (OUTPATIENT)
Dept: FAMILY MEDICINE CLINIC | Facility: CLINIC | Age: 75
End: 2020-08-05

## 2020-08-05 NOTE — TELEPHONE ENCOUNTER
I tried to get the "right department" multiple times  I called 3 different numbers and was transferred twice  Phone number for the Rx line [phone] and the ID number is [id number]

## 2020-08-05 NOTE — TELEPHONE ENCOUNTER
Patient called and said his pharmacy said he needs a prior authorization for his glucose blood (nathen contour next test) stripe  [facility] [place]  Only has 2 stripes left

## 2020-08-13 ENCOUNTER — TELEPHONE (OUTPATIENT)
Dept: FAMILY MEDICINE CLINIC | Facility: CLINIC | Age: 75
End: 2020-08-13

## 2020-08-13 NOTE — TELEPHONE ENCOUNTER
Patient is coming by with a form he says needs to be filled out before he can get his test strips  Just wanted to make you aware that he is coming in this afternoon

## 2020-09-20 DIAGNOSIS — I10 ESSENTIAL HYPERTENSION: ICD-10-CM

## 2020-09-20 DIAGNOSIS — G47.00 INSOMNIA, UNSPECIFIED TYPE: ICD-10-CM

## 2020-09-20 DIAGNOSIS — K20.90 ESOPHAGITIS: ICD-10-CM

## 2020-09-20 DIAGNOSIS — E78.2 MIXED HYPERLIPIDEMIA: ICD-10-CM

## 2020-09-20 RX ORDER — OMEPRAZOLE 40 MG/1
CAPSULE, DELAYED RELEASE ORAL
Qty: 90 CAPSULE | Refills: 3 | Status: SHIPPED | OUTPATIENT
Start: 2020-09-20 | End: 2021-08-16

## 2020-09-20 RX ORDER — LISINOPRIL 40 MG/1
TABLET ORAL
Qty: 90 TABLET | Refills: 3 | Status: SHIPPED | OUTPATIENT
Start: 2020-09-20 | End: 2021-08-16

## 2020-09-20 RX ORDER — TRAZODONE HYDROCHLORIDE 100 MG/1
TABLET ORAL
Qty: 90 TABLET | Refills: 3 | Status: SHIPPED | OUTPATIENT
Start: 2020-09-20 | End: 2021-04-22 | Stop reason: DRUGHIGH

## 2020-09-20 RX ORDER — HYDROCHLOROTHIAZIDE 25 MG/1
12.5 TABLET ORAL DAILY
Qty: 45 TABLET | Refills: 3 | Status: SHIPPED | OUTPATIENT
Start: 2020-09-20

## 2020-09-20 RX ORDER — SIMVASTATIN 5 MG
TABLET ORAL
Qty: 90 TABLET | Refills: 3 | Status: SHIPPED | OUTPATIENT
Start: 2020-09-20 | End: 2021-08-16

## 2020-09-22 ENCOUNTER — OFFICE VISIT (OUTPATIENT)
Dept: OBGYN CLINIC | Facility: HOSPITAL | Age: 75
End: 2020-09-22
Payer: MEDICARE

## 2020-09-22 VITALS
SYSTOLIC BLOOD PRESSURE: 122 MMHG | HEART RATE: 73 BPM | HEIGHT: 65 IN | WEIGHT: 186 LBS | BODY MASS INDEX: 30.99 KG/M2 | DIASTOLIC BLOOD PRESSURE: 73 MMHG

## 2020-09-22 DIAGNOSIS — G89.29 CHRONIC PAIN OF RIGHT KNEE: Primary | ICD-10-CM

## 2020-09-22 DIAGNOSIS — M17.11 PRIMARY OSTEOARTHRITIS OF RIGHT KNEE: ICD-10-CM

## 2020-09-22 DIAGNOSIS — M25.561 CHRONIC PAIN OF RIGHT KNEE: Primary | ICD-10-CM

## 2020-09-22 PROCEDURE — 20610 DRAIN/INJ JOINT/BURSA W/O US: CPT | Performed by: ORTHOPAEDIC SURGERY

## 2020-09-22 PROCEDURE — 99213 OFFICE O/P EST LOW 20 MIN: CPT | Performed by: ORTHOPAEDIC SURGERY

## 2020-09-22 RX ORDER — BUPIVACAINE HYDROCHLORIDE 2.5 MG/ML
2 INJECTION, SOLUTION INFILTRATION; PERINEURAL
Status: COMPLETED | OUTPATIENT
Start: 2020-09-22 | End: 2020-09-22

## 2020-09-22 RX ORDER — METHYLPREDNISOLONE ACETATE 40 MG/ML
2 INJECTION, SUSPENSION INTRA-ARTICULAR; INTRALESIONAL; INTRAMUSCULAR; SOFT TISSUE
Status: COMPLETED | OUTPATIENT
Start: 2020-09-22 | End: 2020-09-22

## 2020-09-22 RX ADMIN — METHYLPREDNISOLONE ACETATE 2 ML: 40 INJECTION, SUSPENSION INTRA-ARTICULAR; INTRALESIONAL; INTRAMUSCULAR; SOFT TISSUE at 09:51

## 2020-09-22 RX ADMIN — BUPIVACAINE HYDROCHLORIDE 2 ML: 2.5 INJECTION, SOLUTION INFILTRATION; PERINEURAL at 09:51

## 2020-09-22 NOTE — PROGRESS NOTES
Assessment:   Diagnosis ICD-10-CM Associated Orders   1  Chronic pain of right knee  M25 561     G89 29    2  Primary osteoarthritis of right knee  M17 11        Plan:    - Xrays of th knee were reviewed again with the patient and his wife that shows minimal narrowing in the medial compartment but continue to have the intermittent locking    - With the knee model it was explained that due to the mild narrowing in the medial compartment and with that mechanical locking, there could be a medial meniscal tear  Either way this could be treated with cortisone injections safely every 3-4 months  If there is a time where the mechanical locking becomes more frequent or painful, that an MRI may be warranted to further evaluate  - A CSI was administered to the right knee today with no complications  To do next visit:  Return in about 3 months (around 12/22/2020) for right knee  The above stated was discussed in layman's terms and the patient expressed understanding  All questions were answered to the patient's satisfaction  Scribe Attestation    I,:   Reena Hawthorne am acting as a scribe while in the presence of the attending physician :        I,:   Radha Carter MD personally performed the services described in this documentation    as scribed in my presence :              Subjective:   Qi Moscoso is a 76 y o  male who presents today for a 3 month follow up for his right knee osteoarthritis  He notes that the injection has given him good relief  The pain is not constant and only with certain motions or with weight bearing  He notes that his knee locks up on him at night when he is changing positions  It happens occasionally  He is doing the knee exercises daily         Review of systems negative unless otherwise specified in HPI    Past Medical History:   Diagnosis Date    Abnormal glucose     last assessed 30Nov2012    Anemia     last assessed 04Aug2014    Benign neoplasm of descending colon 2009    Chondromalacia of patella 2011    unspecified laterality    Diverticulitis of colon 2011    Ganglion     last assessed [id number]    Gastroenteritis     last assessed [id number]    Glucose intolerance     last assessed [id number]    Hyperesthesia 2010    Rectal hemorrhage 2010       Past Surgical History:   Procedure Laterality Date    ADENOIDECTOMY  1950    INCISIONAL BREAST BIOPSY Left 1980    L breast did bx     NECK SURGERY  1950    neck gland removed as child [address]  1950       Family History   Problem Relation Age of Onset    Alzheimer's disease Mother     Heart attack Father     Alcohol abuse Neg Hx     Substance Abuse Neg Hx        Social History     Occupational History    Not on file   Tobacco Use    Smoking status: Former Smoker     Last attempt to quit:      Years since quittin 7    Smokeless tobacco: Never Used    Tobacco comment: quit in [id number]   Substance and Sexual Activity    Alcohol use: Yes     Comment: social    Drug use: No    Sexual activity: Not on file         Current Outpatient Medications:     Alpha-Lipoic Acid 600 MG CAPS, Take by mouth, Disp: , Rfl:     Ascorbic Acid (VITAMIN C) 100 MG tablet, Take 1 tablet by mouth daily, Disp: , Rfl:     aspirin 81 mg chewable tablet, Chew 1 tablet daily, Disp: , Rfl:     Cholecalciferol (VITAMIN D3) 400 units CAPS, Take by mouth, Disp: , Rfl:     glucose blood (Randy Contour Next Test) test strip, 1 each by Other route as needed (Use once daily), Disp: 100 each, Rfl: 3    hydrochlorothiazide (HYDRODIURIL) 25 mg tablet, Take 0 5 tablets (12 5 mg total) by mouth daily, Disp: 45 tablet, Rfl: 3    lisinopril (ZESTRIL) 40 mg tablet, TAKE 1 TABLET BY MOUTH  DAILY, Disp: 90 tablet, Rfl: 3    MICROLET LANCETS MISC, Test sugars twice a day, Disp: 100 each, Rfl: 3    multivitamin-minerals (CENTRUM ADULTS) tablet, Take 1 tablet by mouth daily, Disp: , Rfl:     omeprazole (PriLOSEC) 40 MG capsule, TAKE 1 CAPSULE BY MOUTH  DAILY, Disp: 90 capsule, Rfl: 3    simvastatin (ZOCOR) 5 MG tablet, TAKE 1 TABLET BY MOUTH  DAILY AT BEDTIME, Disp: 90 tablet, Rfl: 3    traZODone (DESYREL) 100 mg tablet, TAKE 1/2 TO 1 TABLET BY  MOUTH AT NIGHT FOR SLEEP, Disp: 90 tablet, Rfl: 3    Allergies   Allergen Reactions    Iodine      Other reaction(s): was told by nurse: heat rash? Vitals:    09/22/20 0918   BP: 122/73   Pulse: 73       Objective:                    Right Knee Exam     Tenderness   The patient is experiencing tenderness in the medial joint line  Range of Motion   Extension: 0   Flexion: 120     Tests   Ramo:  Medial - positive   Varus: negative Valgus: negative    Other   Erythema: absent  Sensation: normal  Pulse: present  Swelling: none  Effusion: no effusion present    Comments:  Calf is soft and non tender  Varus deformity          Diagnostics, reviewed and taken today if performed as documented:     The attending physician has personally reviewed the pertinent films in PACS and interpretation is as follows:  None performed today      Procedures, if performed today:    Large joint arthrocentesis: R knee  Date/Time: 9/22/2020 9:51 AM  Consent given by: patient  Site marked: site marked  Timeout: Immediately prior to procedure a time out was called to verify the correct patient, procedure, equipment, support staff and site/side marked as required   Supporting Documentation  Indications: pain   Procedure Details  Location: knee - R knee  Preparation: Patient was prepped and draped in the usual sterile fashion  Needle size: 22 G  Ultrasound guidance: no  Approach: anterolateral  Medications administered: 2 mL bupivacaine 0 25 %; 2 mL methylPREDNISolone acetate 40 mg/mL    Patient tolerance: patient tolerated the procedure well with no immediate complications  Dressing:  Sterile dressing applied          Portions of the record may have been created with voice recognition software  Occasional wrong word or "sound a like" substitutions may have occurred due to the inherent limitations of voice recognition software  Read the chart carefully and recognize, using context, where substitutions have occurred

## 2020-12-01 ENCOUNTER — LAB (OUTPATIENT)
Dept: LAB | Facility: HOSPITAL | Age: 75
End: 2020-12-01
Payer: MEDICARE

## 2020-12-01 DIAGNOSIS — E11.29 TYPE 2 DIABETES MELLITUS WITH MICROALBUMINURIA, WITHOUT LONG-TERM CURRENT USE OF INSULIN (HCC): ICD-10-CM

## 2020-12-01 DIAGNOSIS — Z12.5 PROSTATE CANCER SCREENING: ICD-10-CM

## 2020-12-01 DIAGNOSIS — I10 ESSENTIAL HYPERTENSION: ICD-10-CM

## 2020-12-01 DIAGNOSIS — R80.9 TYPE 2 DIABETES MELLITUS WITH MICROALBUMINURIA, WITHOUT LONG-TERM CURRENT USE OF INSULIN (HCC): ICD-10-CM

## 2020-12-01 DIAGNOSIS — E55.9 VITAMIN D DEFICIENCY: ICD-10-CM

## 2020-12-01 DIAGNOSIS — E78.2 MIXED HYPERLIPIDEMIA: ICD-10-CM

## 2020-12-01 LAB
25(OH)D3 SERPL-MCNC: 30.9 NG/ML (ref 30–100)
ALBUMIN SERPL BCP-MCNC: 4.1 G/DL (ref 3.5–5)
ALP SERPL-CCNC: 69 U/L (ref 46–116)
ALT SERPL W P-5'-P-CCNC: 36 U/L (ref 12–78)
ANION GAP SERPL CALCULATED.3IONS-SCNC: 5 MMOL/L (ref 4–13)
AST SERPL W P-5'-P-CCNC: 19 U/L (ref 5–45)
BASOPHILS # BLD AUTO: 0.05 THOUSANDS/ΜL (ref 0–0.1)
BASOPHILS NFR BLD AUTO: 1 % (ref 0–1)
BILIRUB SERPL-MCNC: 0.59 MG/DL (ref 0.2–1)
BILIRUB UR QL STRIP: NEGATIVE
BUN SERPL-MCNC: 19 MG/DL (ref 5–25)
CALCIUM SERPL-MCNC: 9.5 MG/DL (ref 8.3–10.1)
CHLORIDE SERPL-SCNC: 106 MMOL/L (ref 100–108)
CHOLEST SERPL-MCNC: 158 MG/DL (ref 50–200)
CLARITY UR: CLEAR
CO2 SERPL-SCNC: 27 MMOL/L (ref 21–32)
COLOR UR: YELLOW
CREAT SERPL-MCNC: 1.2 MG/DL (ref 0.6–1.3)
CREAT UR-MCNC: 233 MG/DL
EOSINOPHIL # BLD AUTO: 0.17 THOUSAND/ΜL (ref 0–0.61)
EOSINOPHIL NFR BLD AUTO: 2 % (ref 0–6)
ERYTHROCYTE [DISTWIDTH] IN BLOOD BY AUTOMATED COUNT: 13.5 % (ref 11.6–15.1)
EST. AVERAGE GLUCOSE BLD GHB EST-MCNC: 128 MG/DL
GFR SERPL CREATININE-BSD FRML MDRD: 59 ML/MIN/1.73SQ M
GLUCOSE P FAST SERPL-MCNC: 123 MG/DL (ref 65–99)
GLUCOSE UR STRIP-MCNC: NEGATIVE MG/DL
HBA1C MFR BLD: 6.1 %
HCT VFR BLD AUTO: 40 % (ref 36.5–49.3)
HDLC SERPL-MCNC: 32 MG/DL
HGB BLD-MCNC: 13.4 G/DL (ref 12–17)
HGB UR QL STRIP.AUTO: NEGATIVE
IMM GRANULOCYTES # BLD AUTO: 0.02 THOUSAND/UL (ref 0–0.2)
IMM GRANULOCYTES NFR BLD AUTO: 0 % (ref 0–2)
KETONES UR STRIP-MCNC: NEGATIVE MG/DL
LDLC SERPL CALC-MCNC: 68 MG/DL (ref 0–100)
LEUKOCYTE ESTERASE UR QL STRIP: NEGATIVE
LYMPHOCYTES # BLD AUTO: 1.89 THOUSANDS/ΜL (ref 0.6–4.47)
LYMPHOCYTES NFR BLD AUTO: 25 % (ref 14–44)
MCH RBC QN AUTO: 31.8 PG (ref 26.8–34.3)
MCHC RBC AUTO-ENTMCNC: 33.5 G/DL (ref 31.4–37.4)
MCV RBC AUTO: 95 FL (ref 82–98)
MICROALBUMIN UR-MCNC: 21.6 MG/L (ref 0–20)
MICROALBUMIN/CREAT 24H UR: 9 MG/G CREATININE (ref 0–30)
MONOCYTES # BLD AUTO: 1.07 THOUSAND/ΜL (ref 0.17–1.22)
MONOCYTES NFR BLD AUTO: 14 % (ref 4–12)
NEUTROPHILS # BLD AUTO: 4.44 THOUSANDS/ΜL (ref 1.85–7.62)
NEUTS SEG NFR BLD AUTO: 58 % (ref 43–75)
NITRITE UR QL STRIP: NEGATIVE
NONHDLC SERPL-MCNC: 126 MG/DL
NRBC BLD AUTO-RTO: 0 /100 WBCS
PH UR STRIP.AUTO: 5.5 [PH]
PLATELET # BLD AUTO: 271 THOUSANDS/UL (ref 149–390)
PMV BLD AUTO: 10.4 FL (ref 8.9–12.7)
POTASSIUM SERPL-SCNC: 3.9 MMOL/L (ref 3.5–5.3)
PROT SERPL-MCNC: 7.5 G/DL (ref 6.4–8.2)
PROT UR STRIP-MCNC: NEGATIVE MG/DL
PSA SERPL-MCNC: 1 NG/ML (ref 0–4)
RBC # BLD AUTO: 4.22 MILLION/UL (ref 3.88–5.62)
SODIUM SERPL-SCNC: 138 MMOL/L (ref 136–145)
SP GR UR STRIP.AUTO: 1.03 (ref 1–1.03)
TRIGL SERPL-MCNC: 289 MG/DL
TSH SERPL DL<=0.05 MIU/L-ACNC: 1.23 UIU/ML (ref 0.36–3.74)
UROBILINOGEN UR QL STRIP.AUTO: 0.2 E.U./DL
WBC # BLD AUTO: 7.64 THOUSAND/UL (ref 4.31–10.16)

## 2020-12-01 PROCEDURE — 80053 COMPREHEN METABOLIC PANEL: CPT

## 2020-12-01 PROCEDURE — 83036 HEMOGLOBIN GLYCOSYLATED A1C: CPT

## 2020-12-01 PROCEDURE — 82570 ASSAY OF URINE CREATININE: CPT

## 2020-12-01 PROCEDURE — 85025 COMPLETE CBC W/AUTO DIFF WBC: CPT

## 2020-12-01 PROCEDURE — 84443 ASSAY THYROID STIM HORMONE: CPT

## 2020-12-01 PROCEDURE — G0103 PSA SCREENING: HCPCS

## 2020-12-01 PROCEDURE — 36415 COLL VENOUS BLD VENIPUNCTURE: CPT

## 2020-12-01 PROCEDURE — 82043 UR ALBUMIN QUANTITATIVE: CPT

## 2020-12-01 PROCEDURE — 81003 URINALYSIS AUTO W/O SCOPE: CPT

## 2020-12-01 PROCEDURE — 82306 VITAMIN D 25 HYDROXY: CPT

## 2020-12-01 PROCEDURE — 80061 LIPID PANEL: CPT

## 2020-12-17 ENCOUNTER — TELEMEDICINE (OUTPATIENT)
Dept: FAMILY MEDICINE CLINIC | Facility: CLINIC | Age: 75
End: 2020-12-17
Payer: MEDICARE

## 2020-12-17 VITALS
BODY MASS INDEX: 30.55 KG/M2 | DIASTOLIC BLOOD PRESSURE: 73 MMHG | WEIGHT: 185 LBS | SYSTOLIC BLOOD PRESSURE: 123 MMHG | HEART RATE: 114 BPM | TEMPERATURE: 98.8 F

## 2020-12-17 DIAGNOSIS — E86.1 HYPOTENSION DUE TO HYPOVOLEMIA: ICD-10-CM

## 2020-12-17 DIAGNOSIS — D50.0 IRON DEFICIENCY ANEMIA DUE TO CHRONIC BLOOD LOSS: ICD-10-CM

## 2020-12-17 DIAGNOSIS — R80.9 TYPE 2 DIABETES MELLITUS WITH MICROALBUMINURIA, WITHOUT LONG-TERM CURRENT USE OF INSULIN (HCC): Primary | ICD-10-CM

## 2020-12-17 DIAGNOSIS — K21.9 GASTROESOPHAGEAL REFLUX DISEASE WITHOUT ESOPHAGITIS: ICD-10-CM

## 2020-12-17 DIAGNOSIS — E55.9 VITAMIN D DEFICIENCY: ICD-10-CM

## 2020-12-17 DIAGNOSIS — E78.2 MIXED HYPERLIPIDEMIA: ICD-10-CM

## 2020-12-17 DIAGNOSIS — E11.42 DIABETIC POLYNEUROPATHY ASSOCIATED WITH TYPE 2 DIABETES MELLITUS (HCC): ICD-10-CM

## 2020-12-17 DIAGNOSIS — I95.89 HYPOTENSION DUE TO HYPOVOLEMIA: ICD-10-CM

## 2020-12-17 DIAGNOSIS — N18.2 CHRONIC KIDNEY DISEASE (CKD) STAGE G2/A1, MILDLY DECREASED GLOMERULAR FILTRATION RATE (GFR) BETWEEN 60-89 ML/MIN/1.73 SQUARE METER AND ALBUMINURIA CREATININE RATIO LESS THAN 30 MG/G: ICD-10-CM

## 2020-12-17 DIAGNOSIS — I10 ESSENTIAL HYPERTENSION: ICD-10-CM

## 2020-12-17 DIAGNOSIS — E11.29 TYPE 2 DIABETES MELLITUS WITH MICROALBUMINURIA, WITHOUT LONG-TERM CURRENT USE OF INSULIN (HCC): Primary | ICD-10-CM

## 2020-12-17 PROBLEM — D50.9 IRON DEFICIENCY ANEMIA: Status: RESOLVED | Noted: 2020-04-23 | Resolved: 2020-12-17

## 2020-12-17 PROCEDURE — 99215 OFFICE O/P EST HI 40 MIN: CPT | Performed by: FAMILY MEDICINE

## 2020-12-22 ENCOUNTER — OFFICE VISIT (OUTPATIENT)
Dept: OBGYN CLINIC | Facility: HOSPITAL | Age: 75
End: 2020-12-22
Payer: MEDICARE

## 2020-12-22 VITALS
WEIGHT: 190 LBS | SYSTOLIC BLOOD PRESSURE: 122 MMHG | BODY MASS INDEX: 31.38 KG/M2 | DIASTOLIC BLOOD PRESSURE: 77 MMHG | HEART RATE: 76 BPM

## 2020-12-22 DIAGNOSIS — M17.11 PRIMARY OSTEOARTHRITIS OF RIGHT KNEE: Primary | ICD-10-CM

## 2020-12-22 DIAGNOSIS — G89.29 CHRONIC PAIN OF RIGHT KNEE: ICD-10-CM

## 2020-12-22 DIAGNOSIS — M25.561 CHRONIC PAIN OF RIGHT KNEE: ICD-10-CM

## 2020-12-22 PROCEDURE — 99213 OFFICE O/P EST LOW 20 MIN: CPT | Performed by: ORTHOPAEDIC SURGERY

## 2020-12-22 PROCEDURE — 20610 DRAIN/INJ JOINT/BURSA W/O US: CPT | Performed by: ORTHOPAEDIC SURGERY

## 2020-12-22 RX ORDER — METHYLPREDNISOLONE ACETATE 40 MG/ML
2 INJECTION, SUSPENSION INTRA-ARTICULAR; INTRALESIONAL; INTRAMUSCULAR; SOFT TISSUE
Status: COMPLETED | OUTPATIENT
Start: 2020-12-22 | End: 2020-12-22

## 2020-12-22 RX ORDER — BUPIVACAINE HYDROCHLORIDE 2.5 MG/ML
2 INJECTION, SOLUTION INFILTRATION; PERINEURAL
Status: COMPLETED | OUTPATIENT
Start: 2020-12-22 | End: 2020-12-22

## 2020-12-22 RX ADMIN — BUPIVACAINE HYDROCHLORIDE 2 ML: 2.5 INJECTION, SOLUTION INFILTRATION; PERINEURAL at 09:22

## 2020-12-22 RX ADMIN — METHYLPREDNISOLONE ACETATE 2 ML: 40 INJECTION, SUSPENSION INTRA-ARTICULAR; INTRALESIONAL; INTRAMUSCULAR; SOFT TISSUE at 09:22

## 2021-01-20 DIAGNOSIS — Z23 ENCOUNTER FOR IMMUNIZATION: ICD-10-CM

## 2021-01-22 ENCOUNTER — IMMUNIZATIONS (OUTPATIENT)
Dept: FAMILY MEDICINE CLINIC | Facility: HOSPITAL | Age: 76
End: 2021-01-22

## 2021-01-22 DIAGNOSIS — Z23 ENCOUNTER FOR IMMUNIZATION: Primary | ICD-10-CM

## 2021-01-22 PROCEDURE — 91301 SARS-COV-2 / COVID-19 MRNA VACCINE (MODERNA) 100 MCG: CPT | Performed by: FAMILY MEDICINE

## 2021-01-22 PROCEDURE — 0011A SARS-COV-2 / COVID-19 MRNA VACCINE (MODERNA) 100 MCG: CPT | Performed by: FAMILY MEDICINE

## 2021-01-28 LAB
LEFT EYE DIABETIC RETINOPATHY: NORMAL
RIGHT EYE DIABETIC RETINOPATHY: NORMAL

## 2021-02-17 ENCOUNTER — IMMUNIZATIONS (OUTPATIENT)
Dept: FAMILY MEDICINE CLINIC | Facility: HOSPITAL | Age: 76
End: 2021-02-17

## 2021-02-17 DIAGNOSIS — Z23 ENCOUNTER FOR IMMUNIZATION: Primary | ICD-10-CM

## 2021-02-17 PROCEDURE — 0012A SARS-COV-2 / COVID-19 MRNA VACCINE (MODERNA) 100 MCG: CPT

## 2021-02-17 PROCEDURE — 91301 SARS-COV-2 / COVID-19 MRNA VACCINE (MODERNA) 100 MCG: CPT

## 2021-03-23 ENCOUNTER — OFFICE VISIT (OUTPATIENT)
Dept: OBGYN CLINIC | Facility: HOSPITAL | Age: 76
End: 2021-03-23
Payer: MEDICARE

## 2021-03-23 VITALS
WEIGHT: 192 LBS | HEIGHT: 65 IN | DIASTOLIC BLOOD PRESSURE: 75 MMHG | HEART RATE: 99 BPM | BODY MASS INDEX: 31.99 KG/M2 | SYSTOLIC BLOOD PRESSURE: 137 MMHG

## 2021-03-23 DIAGNOSIS — G89.29 CHRONIC PAIN OF RIGHT KNEE: ICD-10-CM

## 2021-03-23 DIAGNOSIS — M25.561 CHRONIC PAIN OF RIGHT KNEE: ICD-10-CM

## 2021-03-23 DIAGNOSIS — M17.11 PRIMARY OSTEOARTHRITIS OF RIGHT KNEE: Primary | ICD-10-CM

## 2021-03-23 PROCEDURE — 99213 OFFICE O/P EST LOW 20 MIN: CPT | Performed by: ORTHOPAEDIC SURGERY

## 2021-03-23 PROCEDURE — 20610 DRAIN/INJ JOINT/BURSA W/O US: CPT | Performed by: ORTHOPAEDIC SURGERY

## 2021-03-23 RX ORDER — BUPIVACAINE HYDROCHLORIDE 2.5 MG/ML
2 INJECTION, SOLUTION INFILTRATION; PERINEURAL
Status: COMPLETED | OUTPATIENT
Start: 2021-03-23 | End: 2021-03-23

## 2021-03-23 RX ORDER — METHYLPREDNISOLONE ACETATE 40 MG/ML
2 INJECTION, SUSPENSION INTRA-ARTICULAR; INTRALESIONAL; INTRAMUSCULAR; SOFT TISSUE
Status: COMPLETED | OUTPATIENT
Start: 2021-03-23 | End: 2021-03-23

## 2021-03-23 RX ADMIN — METHYLPREDNISOLONE ACETATE 2 ML: 40 INJECTION, SUSPENSION INTRA-ARTICULAR; INTRALESIONAL; INTRAMUSCULAR; SOFT TISSUE at 10:50

## 2021-03-23 RX ADMIN — BUPIVACAINE HYDROCHLORIDE 2 ML: 2.5 INJECTION, SOLUTION INFILTRATION; PERINEURAL at 10:50

## 2021-03-23 NOTE — PROGRESS NOTES
Assessment:   Diagnosis ICD-10-CM Associated Orders   1  Primary osteoarthritis of right knee  M17 11    2  Chronic pain of right knee  M25 561     G89 29        Plan:    * On exam, patient continues to maintain good ROM of the right knee  Most of pain is located anteromedial    * He continues to have 3 months of relief with period CSI and wishes to proceed with one today  Explained that when the cortisone starts to decrease in its efficacy, 8 weeks or so then we can try visco supplementation  * Patient should continue with his tylenol, knee sleeve, activity modification  To do next visit:  Return in about 3 months (around 6/23/2021) for Right knee  The above stated was discussed in layman's terms and the patient expressed understanding  All questions were answered to the patient's satisfaction  Scribe Attestation    I,:  Kaden Corona am acting as a scribe while in the presence of the attending physician :       I,:  Erika Smith MD personally performed the services described in this documentation    as scribed in my presence :             Subjective:   Lyla Jones is a 76 y o  male who presents today for 3 month follow-up for his right knee osteoarthritis  He has treated  With periodic cortisone injections to the right knee as needed  And notes that they do give him 3 months of relief  He notes that most of his pain is located anteromedially  The pain is worse  From a deep seated position  Notes that he gets benefit from Tylenol and knee sleeves  Review of systems negative unless otherwise specified in HPI  Review of Systems   Constitutional: Negative for chills, fever and unexpected weight change  HENT: Negative for hearing loss, nosebleeds and sore throat  Eyes: Negative for pain, redness and visual disturbance  Respiratory: Negative for cough, shortness of breath and wheezing  Cardiovascular: Negative for chest pain, palpitations and leg swelling     Gastrointestinal: Negative for abdominal pain, nausea and vomiting  Endocrine: Negative for polydipsia and polyuria  Genitourinary: Negative for dysuria and hematuria  Skin: Negative for rash and wound  Neurological: Negative for dizziness, light-headedness and headaches  Psychiatric/Behavioral: Negative for decreased concentration, dysphoric mood and suicidal ideas  The patient is not nervous/anxious          Past Medical History:   Diagnosis Date    Abnormal glucose     last assessed [id number]    Anemia     last assessed [id number]    Benign neoplasm of descending colon 2009    Chondromalacia of patella 2011    unspecified laterality    Diverticulitis of colon 2011    Ganglion     last assessed [id number]    Gastroenteritis     last assessed [id number]    Glucose intolerance     last assessed [id number]    Hyperesthesia 2010    Rectal hemorrhage 2010       Past Surgical History:   Procedure Laterality Date    ADENOIDECTOMY  1950    INCISIONAL BREAST BIOPSY Left 1980    L breast did bx     NECK SURGERY  1950    neck gland removed as child [address]  1950       Family History   Problem Relation Age of Onset    Alzheimer's disease Mother     Heart attack Father     Alcohol abuse Neg Hx     Substance Abuse Neg Hx        Social History     Occupational History    Not on file   Tobacco Use    Smoking status: Former Smoker     Quit date:      Years since quittin 2    Smokeless tobacco: Never Used    Tobacco comment: quit in [id number]   Substance and Sexual Activity    Alcohol use: Yes     Comment: social    Drug use: No    Sexual activity: Not on file         Current Outpatient Medications:     Alpha-Lipoic Acid 600 MG CAPS, Take by mouth, Disp: , Rfl:     Ascorbic Acid (VITAMIN C) 100 MG tablet, Take 1 tablet by mouth daily, Disp: , Rfl:     aspirin 81 mg chewable tablet, Chew 1 tablet daily, Disp: , Rfl:    Cholecalciferol (VITAMIN D3) 400 units CAPS, Take by mouth, Disp: , Rfl:     glucose blood (Randy Contour Next Test) test strip, 1 each by Other route as needed (Use once daily), Disp: 100 each, Rfl: 3    hydrochlorothiazide (HYDRODIURIL) 25 mg tablet, Take 0 5 tablets (12 5 mg total) by mouth daily, Disp: 45 tablet, Rfl: 3    lisinopril (ZESTRIL) 40 mg tablet, TAKE 1 TABLET BY MOUTH  DAILY, Disp: 90 tablet, Rfl: 3    MICROLET LANCETS MISC, Test sugars twice a day, Disp: 100 each, Rfl: 3    multivitamin-minerals (CENTRUM ADULTS) tablet, Take 1 tablet by mouth daily, Disp: , Rfl:     omeprazole (PriLOSEC) 40 MG capsule, TAKE 1 CAPSULE BY MOUTH  DAILY, Disp: 90 capsule, Rfl: 3    simvastatin (ZOCOR) 5 MG tablet, TAKE 1 TABLET BY MOUTH  DAILY AT BEDTIME, Disp: 90 tablet, Rfl: 3    traZODone (DESYREL) 100 mg tablet, TAKE 1/2 TO 1 TABLET BY  MOUTH AT NIGHT FOR SLEEP, Disp: 90 tablet, Rfl: 3    Allergies   Allergen Reactions    Iodine      Other reaction(s): was told by nurse: heat rash? Vitals:    03/23/21 0917   BP: 137/75   Pulse: 99       Objective:                    Right Knee Exam     Tenderness   The patient is experiencing tenderness in the medial joint line and lateral joint line  Range of Motion   Extension: 0   Flexion: 110     Tests   Varus: negative Valgus: negative    Other   Erythema: absent  Sensation: normal  Pulse: present  Swelling: none  Effusion: no effusion present    Comments:  Varus deformity noted  Calf is soft and non tender              Diagnostics, reviewed and taken today if performed as documented:    None performed      The attending physician has personally reviewed the pertinent films in PACS and interpretation is as follows:      Procedures, if performed today:    Large joint arthrocentesis: R knee  Universal Protocol:  Consent: Verbal consent obtained    Risks and benefits: risks, benefits and alternatives were discussed  Consent given by: patient  Time out: Immediately prior to procedure a "time out" was called to verify the correct patient, procedure, equipment, support staff and site/side marked as required  Timeout called at: 3/23/2021 10:49 AM   Patient understanding: patient states understanding of the procedure being performed  Site marked: the operative site was marked  Patient identity confirmed: verbally with patient    Supporting Documentation  Indications: pain   Procedure Details  Location: knee - R knee  Preparation: Patient was prepped and draped in the usual sterile fashion  Needle size: 22 G  Ultrasound guidance: no  Approach: anterolateral  Medications administered: 2 mL bupivacaine 0 25 %; 2 mL methylPREDNISolone acetate 40 mg/mL    Patient tolerance: patient tolerated the procedure well with no immediate complications  Dressing:  Sterile dressing applied          Portions of the record may have been created with voice recognition software  Occasional wrong word or "sound a like" substitutions may have occurred due to the inherent limitations of voice recognition software  Read the chart carefully and recognize, using context, where substitutions have occurred

## 2021-04-18 NOTE — PROGRESS NOTES
ASSESSMENT/PLAN:    Stye  Moist heat pressure until it goes away   Showed him the lump under his eyelid with a mirror so he knows were to watch    Type 2 diabetes  A1c slightly up 6 3 from 6 1  Ask patient to cut down on bread pasta and sweets  We will check this again in 6 months    Osteoarthritis of the knee  Injections are helping will continue to have those as needed through Ortho      Diabetic nephropathy  Seems to have resolved as GFR is greater than 60  Microalbumin also improved 16 from 21  We will continue to check that every 6 months  Continue lisinopril     Esophageal reflux  Only omeprazole works      Hyperlipidemia   Cholesterol values excellent with simvastatin, continue the same       Hypertension   Blood pressure is good with lisinopril HCTZ, continue the same       Insomnia  Increase from 100-150 mg at night and see if this helps him sleep more than 4 hours at a time         Recheck in 6 mo  A1c   Recheck sooner if needed       BMI Counseling: Body mass index is 30 46 kg/m²  The BMI is above normal  Nutrition recommendations include decreasing portion sizes and encouraging healthy choices of fruits and vegetables  Exercise recommendations include exercising 3-5 times per week                Health Maintenance   Topic Date Due    Hepatitis C Screening  Never done    SLP PLAN OF CARE  Never done    BMI: Followup Plan  04/23/2021    Diabetic Foot Exam  06/01/2021    Medicare Annual Wellness Visit (AWV)  07/27/2021    HEMOGLOBIN A1C  10/19/2021    Fall Risk  04/22/2022    Depression Screening PHQ  04/22/2022    BMI: Adult  04/22/2022    DM Eye Exam  01/28/2023    Colonoscopy Surveillance  07/24/2023    Colorectal Cancer Screening  07/24/2028    DTaP,Tdap,and Td Vaccines (5 - Td) 07/17/2029    Pneumococcal Vaccine: 65+ Years  Completed    Influenza Vaccine  Completed    COVID-19 Vaccine  Completed    HIB Vaccine  Aged Out    Hepatitis B Vaccine  Aged Out    IPV Vaccine  Aged C/ [place] Hepatitis A Vaccine  Aged Out    Meningococcal ACWY Vaccine  Aged Out    HPV Vaccine  Aged Out         Problem List as of 4/22/2021 Reviewed: 3/23/2021 11:06 AM by Kris Adam MD    Actinic keratosis    Adjustment insomnia    Allergic rhinitis    Creatinine elevation    De Quervain's tenosynovitis, right    Diabetic polyneuropathy associated with type 2 diabetes mellitus ([address] )    Diverticulosis of colon    Elastosis of skin    Essential hypertension    Gastroesophageal reflux disease without esophagitis    Hearing loss    Hiatal hernia    Mixed hyperlipidemia    Primary open angle glaucoma (POAG) of both eyes, mild stage    Tinnitus    Trigger middle finger of right hand    Type 2 diabetes mellitus with microalbuminuria, without long-term current use of insulin (HCC)    Vitamin D deficiency            Subjective:   Chief Complaint   Patient presents with    Heartburn    Diabetes    Hypertension     Patient here for his diabetic recheck  Since last visit he saw Orthopedic and had another injection  The seems to help his knee for quite a while    He also had his A1c and urine for microalbumin done    Overall he feels well without any issues  He is compliant with his meds    Patient did see Dr Re Aguilar and had this skin lesion removed  Was benign    Did have a problem this week with his left eye I   Had a bump on the lower eye lid and squeeze and had a lot of discharge    he is asking to have this  examined      patient ID: Manuel Lal is a 76 y o  male      Past Medical History:   Diagnosis Date    Abnormal glucose     last assessed [id number]    Anemia     last assessed [id number]    Benign neoplasm of descending colon 12/01/2009    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    unspecified laterality    Diverticulitis of colon 02/22/2011    Ganglion     last assessed [id number]    Gastroenteritis     last assessed [id number]    Glucose intolerance     last assessed [id number]    Hyperesthesia 11/30/2010    Rectal hemorrhage 2010     Past Surgical History:   Procedure Laterality Date    ADENOIDECTOMY  1950    INCISIONAL BREAST BIOPSY Left 1980    L breast did bx     NECK SURGERY  1950    neck gland removed as child [address]  1950     Family History   Problem Relation Age of Onset    Alzheimer's disease Mother     Heart attack Father     Alcohol abuse Neg Hx     Substance Abuse Neg Hx      Social History     Tobacco Use    Smoking status: Former Smoker     Quit date:      Years since quittin 3    Smokeless tobacco: Never Used    Tobacco comment: quit in [id number]   Substance Use Topics    Alcohol use: Yes     Comment: social    Drug use: No     Social History     Tobacco Use   Smoking Status Former Smoker    Quit date:     Years since quittin 3   Smokeless Tobacco Never Used   Tobacco Comment    quit in [id number]        MED LIST WAS REVIEWED AND UPDATED       ROS  As per HPI  Rest of 12 point review of systems negative     Objective:      VITALS:  Wt Readings from Last 3 Encounters:   21 84 3 kg (185 lb 14 4 oz)   21 87 1 kg (192 lb)   20 86 2 kg (190 lb)     BP Readings from Last 3 Encounters:   21 124/80   21 137/75   20 122/77     Pulse Readings from Last 3 Encounters:   21 64   21 99   20 76     Body mass index is 30 46 kg/m²  Laboratory Results:    All pertinent labs and studies were reviewed with patient during this office visit  with highlights of the results contained in this notes ASSESSMENT AND PLAN section       Physical Exam  Constitutional  Appears healthy, Looks well, Appearance consistent with age    Mental Status  Alert, Oriented, Cooperative, Memory function normal , clean, and reasonable    HEENT   Normocephalic atraumatic no facial weakness   Left lower eyelid temporal side has a 3 mm nodule that is pointed    Neck  No neck mass, No thyromegaly, Good carotid upstrokes bilaterally, trachea midline positive click    Respiratory  Breath sounds normal, No rales, No rhonchi, No wheezing, normal palpation    Cardiac   Regular rhythm without ectopy or murmur no S3-S4, no heave lift or thrill to palpation    Vascular  No leg edema, No pedal edema    Muscular skeletal  No clubbing cyanosis , muscle tone normal    Skin  No appreciable rashes or abnormal appearing lesions    Patient's shoes and socks removed  Right Foot/Ankle   Right Foot Inspection  Skin Exam: skin normal and skin intact no dry skin, no warmth, no callus, no erythema, no maceration, no abnormal color, no pre-ulcer, no ulcer and no callus                            Sensory       Monofilament testing: diminished  Vascular    The right DP pulse is 2+  The right PT pulse is 2+  Left Foot/Ankle  Left Foot Inspection  Skin Exam: skin normal and skin intactno dry skin, no warmth, no erythema, no maceration, normal color, no pre-ulcer, no ulcer and no callus                                         Sensory       Monofilament: diminished  Vascular    The left DP pulse is 2+  The left PT pulse is 2+  Assign Risk Category:  No deformity present; Loss of protective sensation;  No weak pulses       Risk: 1

## 2021-04-19 ENCOUNTER — LAB (OUTPATIENT)
Dept: LAB | Facility: HOSPITAL | Age: 76
End: 2021-04-19
Payer: MEDICARE

## 2021-04-19 DIAGNOSIS — N18.2 CHRONIC KIDNEY DISEASE (CKD) STAGE G2/A1, MILDLY DECREASED GLOMERULAR FILTRATION RATE (GFR) BETWEEN 60-89 ML/MIN/1.73 SQUARE METER AND ALBUMINURIA CREATININE RATIO LESS THAN 30 MG/G: ICD-10-CM

## 2021-04-19 DIAGNOSIS — R80.9 TYPE 2 DIABETES MELLITUS WITH MICROALBUMINURIA, WITHOUT LONG-TERM CURRENT USE OF INSULIN (HCC): ICD-10-CM

## 2021-04-19 DIAGNOSIS — E11.29 TYPE 2 DIABETES MELLITUS WITH MICROALBUMINURIA, WITHOUT LONG-TERM CURRENT USE OF INSULIN (HCC): ICD-10-CM

## 2021-04-19 LAB
ANION GAP SERPL CALCULATED.3IONS-SCNC: 5 MMOL/L (ref 4–13)
BUN SERPL-MCNC: 16 MG/DL (ref 5–25)
CALCIUM SERPL-MCNC: 9.1 MG/DL (ref 8.3–10.1)
CHLORIDE SERPL-SCNC: 109 MMOL/L (ref 100–108)
CO2 SERPL-SCNC: 28 MMOL/L (ref 21–32)
CREAT SERPL-MCNC: 1.14 MG/DL (ref 0.6–1.3)
CREAT UR-MCNC: 145 MG/DL
EST. AVERAGE GLUCOSE BLD GHB EST-MCNC: 134 MG/DL
GFR SERPL CREATININE-BSD FRML MDRD: 63 ML/MIN/1.73SQ M
GLUCOSE P FAST SERPL-MCNC: 126 MG/DL (ref 65–99)
HBA1C MFR BLD: 6.3 %
MICROALBUMIN UR-MCNC: 16 MG/L (ref 0–20)
MICROALBUMIN/CREAT 24H UR: 11 MG/G CREATININE (ref 0–30)
POTASSIUM SERPL-SCNC: 4.2 MMOL/L (ref 3.5–5.3)
SODIUM SERPL-SCNC: 142 MMOL/L (ref 136–145)

## 2021-04-19 PROCEDURE — 82043 UR ALBUMIN QUANTITATIVE: CPT

## 2021-04-19 PROCEDURE — 83036 HEMOGLOBIN GLYCOSYLATED A1C: CPT

## 2021-04-19 PROCEDURE — 80048 BASIC METABOLIC PNL TOTAL CA: CPT

## 2021-04-19 PROCEDURE — 82570 ASSAY OF URINE CREATININE: CPT

## 2021-04-19 PROCEDURE — 36415 COLL VENOUS BLD VENIPUNCTURE: CPT

## 2021-04-22 ENCOUNTER — OFFICE VISIT (OUTPATIENT)
Dept: FAMILY MEDICINE CLINIC | Facility: CLINIC | Age: 76
End: 2021-04-22
Payer: MEDICARE

## 2021-04-22 VITALS
TEMPERATURE: 98.4 F | BODY MASS INDEX: 29.88 KG/M2 | HEIGHT: 66 IN | HEART RATE: 64 BPM | WEIGHT: 185.9 LBS | SYSTOLIC BLOOD PRESSURE: 124 MMHG | DIASTOLIC BLOOD PRESSURE: 80 MMHG | RESPIRATION RATE: 17 BRPM

## 2021-04-22 DIAGNOSIS — I10 ESSENTIAL HYPERTENSION: ICD-10-CM

## 2021-04-22 DIAGNOSIS — R79.89 CREATININE ELEVATION: ICD-10-CM

## 2021-04-22 DIAGNOSIS — I77.811 ECTATIC ABDOMINAL AORTA (HCC): ICD-10-CM

## 2021-04-22 DIAGNOSIS — E78.2 MIXED HYPERLIPIDEMIA: ICD-10-CM

## 2021-04-22 DIAGNOSIS — R80.9 TYPE 2 DIABETES MELLITUS WITH MICROALBUMINURIA, WITHOUT LONG-TERM CURRENT USE OF INSULIN (HCC): Primary | ICD-10-CM

## 2021-04-22 DIAGNOSIS — E11.29 TYPE 2 DIABETES MELLITUS WITH MICROALBUMINURIA, WITHOUT LONG-TERM CURRENT USE OF INSULIN (HCC): Primary | ICD-10-CM

## 2021-04-22 DIAGNOSIS — H00.015 HORDEOLUM EXTERNUM OF LEFT LOWER EYELID: ICD-10-CM

## 2021-04-22 DIAGNOSIS — G47.00 INSOMNIA, UNSPECIFIED TYPE: ICD-10-CM

## 2021-04-22 DIAGNOSIS — K21.9 GASTROESOPHAGEAL REFLUX DISEASE WITHOUT ESOPHAGITIS: ICD-10-CM

## 2021-04-22 DIAGNOSIS — E11.42 DIABETIC POLYNEUROPATHY ASSOCIATED WITH TYPE 2 DIABETES MELLITUS (HCC): ICD-10-CM

## 2021-04-22 DIAGNOSIS — F51.02 ADJUSTMENT INSOMNIA: ICD-10-CM

## 2021-04-22 DIAGNOSIS — E55.9 VITAMIN D DEFICIENCY: ICD-10-CM

## 2021-04-22 PROCEDURE — 99215 OFFICE O/P EST HI 40 MIN: CPT | Performed by: FAMILY MEDICINE

## 2021-04-22 PROCEDURE — 93000 ELECTROCARDIOGRAM COMPLETE: CPT | Performed by: FAMILY MEDICINE

## 2021-04-22 RX ORDER — TRAZODONE HYDROCHLORIDE 150 MG/1
150 TABLET ORAL
Qty: 90 TABLET | Refills: 3 | Status: SHIPPED | OUTPATIENT
Start: 2021-04-22 | End: 2021-08-05 | Stop reason: SDUPTHER

## 2021-04-22 NOTE — PATIENT INSTRUCTIONS
1  Practice balancing every day by lifting a leg up and counting to 5 on each leg 10 times each leg    2   Try to cut down on pasta bread crackers and sweets in order to bring her A1c down and  weight  The more you exercise in the more you do the quick her sugars are come down as well as well as weight    See you in 6 months with nonfasting blood work just an A1c 1 week prior

## 2021-06-29 ENCOUNTER — OFFICE VISIT (OUTPATIENT)
Dept: OBGYN CLINIC | Facility: HOSPITAL | Age: 76
End: 2021-06-29
Payer: MEDICARE

## 2021-06-29 VITALS
HEART RATE: 68 BPM | DIASTOLIC BLOOD PRESSURE: 87 MMHG | WEIGHT: 191 LBS | SYSTOLIC BLOOD PRESSURE: 165 MMHG | HEIGHT: 66 IN | BODY MASS INDEX: 30.7 KG/M2

## 2021-06-29 DIAGNOSIS — M17.11 PRIMARY OSTEOARTHRITIS OF RIGHT KNEE: Primary | ICD-10-CM

## 2021-06-29 PROCEDURE — 99213 OFFICE O/P EST LOW 20 MIN: CPT | Performed by: ORTHOPAEDIC SURGERY

## 2021-06-29 PROCEDURE — 20610 DRAIN/INJ JOINT/BURSA W/O US: CPT | Performed by: ORTHOPAEDIC SURGERY

## 2021-06-29 RX ORDER — METHYLPREDNISOLONE ACETATE 40 MG/ML
2 INJECTION, SUSPENSION INTRA-ARTICULAR; INTRALESIONAL; INTRAMUSCULAR; SOFT TISSUE
Status: COMPLETED | OUTPATIENT
Start: 2021-06-29 | End: 2021-06-29

## 2021-06-29 RX ORDER — BUPIVACAINE HYDROCHLORIDE 2.5 MG/ML
2 INJECTION, SOLUTION INFILTRATION; PERINEURAL
Status: COMPLETED | OUTPATIENT
Start: 2021-06-29 | End: 2021-06-29

## 2021-06-29 RX ADMIN — BUPIVACAINE HYDROCHLORIDE 2 ML: 2.5 INJECTION, SOLUTION INFILTRATION; PERINEURAL at 11:26

## 2021-06-29 RX ADMIN — METHYLPREDNISOLONE ACETATE 2 ML: 40 INJECTION, SUSPENSION INTRA-ARTICULAR; INTRALESIONAL; INTRAMUSCULAR; SOFT TISSUE at 11:26

## 2021-06-29 NOTE — PROGRESS NOTES
Assessment:  1  Primary osteoarthritis of right knee         Plan:       Weightbearing as tolerated right lower extremity   Case discussed with Dr Richelle Pizarro   Right knee intra-articular corticosteroid injection given as noted above    Patient advised should they develop any increasing pain, redness, drainage, numbness, tingling or swelling surrounding the injection sight, they are to contact our office or present to the emergency department  Prior authorization initiated for viscosupplementation of the patient's right knee  Patient is aware he may be candidate for total knee arthroplasty using fail conservative management  The above stated was discussed in layman's terms and the patient expressed understanding  All questions were answered to the patient's satisfaction  Subjective:   Gerard Chang is a 76 y o  male who presents for right knee pain due to osteoarthritis  He had a right knee steroid injection on 03/23/2021 and had relief for two months  He notes pain with kneeling, squatting and increase activities  He is taking Aleve as needed for pain  Denies any numbness or tingling         Review of systems negative unless otherwise specified in HPI    Past Medical History:   Diagnosis Date    Abnormal glucose     last assessed [id number]    Anemia     last assessed [id number]    Benign neoplasm of descending colon 12/01/2009    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    unspecified laterality    Diverticulitis of colon 02/22/2011    Ganglion     last assessed [id number]    Gastroenteritis     last assessed [id number]    Glucose intolerance     last assessed [id number]    Hyperesthesia 11/30/2010    Rectal hemorrhage 06/01/2010       Past Surgical History:   Procedure Laterality Date    ADENOIDECTOMY  01[birth year]01[birth year]    INCISIONAL BREAST BIOPSY Left 01/01/1980    L breast did bx 1980    NECK SURGERY  01/01/1950    neck gland removed as child [address]   01[birth year] Family History   Problem Relation Age of Onset    Alzheimer's disease Mother     Heart attack Father     Alcohol abuse Neg Hx     Substance Abuse Neg Hx        Social History     Occupational History    Not on file   Tobacco Use    Smoking status: Former Smoker     Quit date:      Years since quittin 5    Smokeless tobacco: Never Used    Tobacco comment: quit in [id number]   Substance and Sexual Activity    Alcohol use: Yes     Comment: social    Drug use: No    Sexual activity: Not on file         Current Outpatient Medications:     Alpha-Lipoic Acid 600 MG CAPS, Take by mouth, Disp: , Rfl:     Ascorbic Acid (VITAMIN C) 100 MG tablet, Take 1 tablet by mouth daily, Disp: , Rfl:     aspirin 81 mg chewable tablet, Chew 1 tablet daily, Disp: , Rfl:     Cholecalciferol (VITAMIN D3) 400 units CAPS, Take by mouth, Disp: , Rfl:     glucose blood (Randy Contour Next Test) test strip, 1 each by Other route as needed (Use once daily), Disp: 100 each, Rfl: 3    hydrochlorothiazide (HYDRODIURIL) 25 mg tablet, Take 0 5 tablets (12 5 mg total) by mouth daily, Disp: 45 tablet, Rfl: 3    lisinopril (ZESTRIL) 40 mg tablet, TAKE 1 TABLET BY MOUTH  DAILY, Disp: 90 tablet, Rfl: 3    MICROLET LANCETS MISC, Test sugars twice a day, Disp: 100 each, Rfl: 3    multivitamin-minerals (CENTRUM ADULTS) tablet, Take 1 tablet by mouth daily, Disp: , Rfl:     omeprazole (PriLOSEC) 40 MG capsule, TAKE 1 CAPSULE BY MOUTH  DAILY, Disp: 90 capsule, Rfl: 3    simvastatin (ZOCOR) 5 MG tablet, TAKE 1 TABLET BY MOUTH  DAILY AT BEDTIME, Disp: 90 tablet, Rfl: 3    traZODone (DESYREL) 150 mg tablet, Take 1 tablet (150 mg total) by mouth daily at bedtime, Disp: 90 tablet, Rfl: 3    Allergies   Allergen Reactions    Iodine - Food Allergy      Other reaction(s): was told by nurse: heat rash?             Vitals:    21 1103   BP: 165/87   Pulse: 68       Objective:            Physical Exam  · General: Awake, Alert, Oriented  · Eyes: Pupils equal, round and reactive to light  · Heart: regular rate and rhythm  · Lungs: No audible wheezing  ·                     Ortho Exam      On examination of the right knee there is no effusion, no erythema  Range of motion to full active extension and flexion to greater than 120°  Pain on palpation medial and lateral joint lines  There is crepitus with range of motion, no warmth to palpation, bony enlargement noted  No pain on palpation pes anserine bursa region or distal iliotibial band  Stable to varus and valgus stress without pain or gapping  Negative anterior and posterior drawer testing  Sensation intact distal pulses present  Neurovascularly Intact Distally     Diagnostics, reviewed and taken today if performed as documented:    None performed      The attending physician has personally reviewed the pertinent films in PACS and interpretation is as follows:      Procedures, if performed today:    Large joint arthrocentesis: R knee  Universal Protocol:  Consent: Verbal consent obtained  Risks and benefits: risks, benefits and alternatives were discussed  Consent given by: patient  Time out: Immediately prior to procedure a "time out" was called to verify the correct patient, procedure, equipment, support staff and site/side marked as required    Timeout called at: 6/29/2021 11:26 AM   Patient understanding: patient states understanding of the procedure being performed  Site marked: the operative site was marked  Supporting Documentation  Indications: pain   Procedure Details  Location: knee - R knee  Preparation: Patient was prepped and draped in the usual sterile fashion  Needle size: 22 G  Ultrasound guidance: no  Approach: anterolateral  Medications administered: 2 mL methylPREDNISolone acetate 40 mg/mL; 2 mL bupivacaine 0 25 %    Patient tolerance: patient tolerated the procedure well with no immediate complications  Dressing:  Sterile dressing applied               Scribe Attestation    I,:   am acting as a scribe while in the presence of the attending physician :       I,:   personally performed the services described in this documentation    as scribed in my presence :             Portions of the record may have been created with voice recognition software  Occasional wrong word or "sound a like" substitutions may have occurred due to the inherent limitations of voice recognition software  Read the chart carefully and recognize, using context, where substitutions have occurred

## 2021-08-05 DIAGNOSIS — F51.02 ADJUSTMENT INSOMNIA: ICD-10-CM

## 2021-08-06 RX ORDER — TRAZODONE HYDROCHLORIDE 150 MG/1
150 TABLET ORAL
Qty: 90 TABLET | Refills: 3 | Status: SHIPPED | OUTPATIENT
Start: 2021-08-06 | End: 2021-10-22 | Stop reason: SINTOL

## 2021-08-13 DIAGNOSIS — K20.90 ESOPHAGITIS: ICD-10-CM

## 2021-08-13 DIAGNOSIS — I10 ESSENTIAL HYPERTENSION: ICD-10-CM

## 2021-08-13 DIAGNOSIS — E78.2 MIXED HYPERLIPIDEMIA: ICD-10-CM

## 2021-08-16 RX ORDER — OMEPRAZOLE 40 MG/1
CAPSULE, DELAYED RELEASE ORAL
Qty: 90 CAPSULE | Refills: 3 | Status: SHIPPED | OUTPATIENT
Start: 2021-08-16 | End: 2022-07-04

## 2021-08-16 RX ORDER — LISINOPRIL 40 MG/1
TABLET ORAL
Qty: 90 TABLET | Refills: 3 | Status: SHIPPED | OUTPATIENT
Start: 2021-08-16 | End: 2022-07-05

## 2021-08-16 RX ORDER — SIMVASTATIN 5 MG
TABLET ORAL
Qty: 90 TABLET | Refills: 3 | Status: SHIPPED | OUTPATIENT
Start: 2021-08-16 | End: 2022-07-04

## 2021-09-30 ENCOUNTER — PROCEDURE VISIT (OUTPATIENT)
Dept: OBGYN CLINIC | Facility: HOSPITAL | Age: 76
End: 2021-09-30
Payer: MEDICARE

## 2021-09-30 VITALS
HEART RATE: 90 BPM | DIASTOLIC BLOOD PRESSURE: 82 MMHG | HEIGHT: 66 IN | BODY MASS INDEX: 30.22 KG/M2 | WEIGHT: 188 LBS | SYSTOLIC BLOOD PRESSURE: 150 MMHG

## 2021-09-30 DIAGNOSIS — M17.11 PRIMARY OSTEOARTHRITIS OF RIGHT KNEE: Primary | ICD-10-CM

## 2021-09-30 PROCEDURE — 20610 DRAIN/INJ JOINT/BURSA W/O US: CPT | Performed by: PHYSICIAN ASSISTANT

## 2021-09-30 NOTE — PROGRESS NOTES
Orthopedics          Oma Cota 76 y o  male MRN: [id number]      Chief Complaint:   right knee pain    HPI:   76 y  o male complaining of right knee pain  Patient presents office today regarding right knee pain due to osteoarthritis  Patient states he did have significant pain relief from his last injection 3 months ago however pain since returned his right knee  Patient states pain is aching nature localizes right knee as any radiation denies instability his right knee at this time  Denies any changes numbness tingling right lower extremity                  Review Of Systems:   · Skin: Normal  · Neuro: See HPI  · Musculoskeletal: See HPI  · All other systems reviewed and are negative    Past Medical History:   Past Medical History:   Diagnosis Date    Abnormal glucose     last assessed [id number]    Anemia     last assessed [id number]    Benign neoplasm of descending colon 12/01/2009    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    unspecified laterality    Diverticulitis of colon 02/22/2011    Ganglion     last assessed [id number]    Gastroenteritis     last assessed [id number]    Glucose intolerance     last assessed [id number]    Hyperesthesia 11/30/2010    Rectal hemorrhage 06/01/2010       Past Surgical History:   Past Surgical History:   Procedure Laterality Date    ADENOIDECTOMY  01/01/1950 1950    INCISIONAL BREAST BIOPSY Left 01/01/1980    L breast did bx 1980    NECK SURGERY  01/01/1950    neck gland removed as child [address]  01/01/1950 1950       Family History:  Family history reviewed and non-contributory  Family History   Problem Relation Age of Onset    Alzheimer's disease Mother     Heart attack Father     Alcohol abuse Neg Hx     Substance Abuse Neg Hx          Social History:  Social History     Socioeconomic History    Marital status: /Civil Union     Spouse name: None    Number of children: None    Years of education: None    Highest education level: None   Occupational History    None   Tobacco Use    Smoking status: Former Smoker     Quit date:      Years since quittin 7    Smokeless tobacco: Never Used    Tobacco comment: quit in [id number]   Substance and Sexual Activity    Alcohol use: Yes     Comment: social    Drug use: No    Sexual activity: None   Other Topics Concern    None   Social History Narrative    Active directive in chart    Always uses seatbelt    Drinks caffeinated tea, 4-6 cups hot tea/day     Social Determinants of Health     Financial Resource Strain:     Difficulty of Paying Living Expenses:    Food Insecurity:     Worried About Running Out of Food in the Last Year:     Ran Out of Food in the Last Year:    Transportation Needs:     Lack of Transportation (Medical):  Lack of Transportation (Non-Medical):    Physical Activity:     Days of Exercise per Week:     Minutes of Exercise per Session:    Stress:     Feeling of Stress :    Social Connections:     Frequency of Communication with Friends and Family:     Frequency of Social Gatherings with Friends and Family:     Attends [employer] Services:     Active Member of Clubs or Organizations:     Attends Club or Organization Meetings:     Marital Status:    Intimate Partner Violence:     Fear of Current or Ex-Partner:     Emotionally Abused:     Physically Abused:     Sexually Abused: Allergies: Allergies   Allergen Reactions    Iodine - Food Allergy      Other reaction(s): was told by nurse: heat rash?        Labs:  0   Lab Value Date/Time    HCT 40 0 2020 0941    HCT 38 7 2020 0945    HCT 36 8 12/10/2019 0751    HCT 40 2 08/10/2015 0946    HCT 41 2 2014 0805    HGB 13 4 2020 0941    HGB 12 6 2020 0945    HGB 11 7 (L) 12/10/2019 0751    HGB 14 0 08/10/2015 0946    HGB 14 3 2014 0805    WBC 7 64 2020 0941    WBC 6 31 2020 0945    WBC 6 47 12/10/2019 0751    WBC 7 24 08/10/2015 0946    WBC 6 44 07/02/2014 0805       Meds:    Current Outpatient Medications:     Alpha-Lipoic Acid 600 MG CAPS, Take by mouth, Disp: , Rfl:     Ascorbic Acid (VITAMIN C) 100 MG tablet, Take 1 tablet by mouth daily, Disp: , Rfl:     aspirin 81 mg chewable tablet, Chew 1 tablet daily, Disp: , Rfl:     Cholecalciferol (VITAMIN D3) 400 units CAPS, Take by mouth, Disp: , Rfl:     glucose blood (Randy Contour Next Test) test strip, 1 each by Other route as needed (Use once daily), Disp: 100 each, Rfl: 3    hydrochlorothiazide (HYDRODIURIL) 25 mg tablet, Take 0 5 tablets (12 5 mg total) by mouth daily, Disp: 45 tablet, Rfl: 3    lisinopril (ZESTRIL) 40 mg tablet, TAKE 1 TABLET BY MOUTH  DAILY, Disp: 90 tablet, Rfl: 3    MICROLET LANCETS MISC, Test sugars twice a day, Disp: 100 each, Rfl: 3    multivitamin-minerals (CENTRUM ADULTS) tablet, Take 1 tablet by mouth daily, Disp: , Rfl:     omeprazole (PriLOSEC) 40 MG capsule, TAKE 1 CAPSULE BY MOUTH  DAILY, Disp: 90 capsule, Rfl: 3    simvastatin (ZOCOR) 5 MG tablet, TAKE 1 TABLET BY MOUTH  DAILY AT BEDTIME, Disp: 90 tablet, Rfl: 3    traZODone (DESYREL) 150 mg tablet, Take 1 tablet (150 mg total) by mouth daily at bedtime (Patient not taking: Reported on 9/30/2021), Disp: 90 tablet, Rfl: 3      Physical Exam:     General Appearance:    Alert, cooperative, no distress, appears stated age   Head:    Normocephalic, without obvious abnormality, atraumatic   Eyes:    conjunctiva/corneas clear, both eyes        Nose:   Nares normal, septum midline, no drainage    Throat:   Lips normal; teeth and gums normal   Neck:    symmetrical, trachea midline, ;     thyroid:  no enlargement/   Back:     Symmetric, no curvature, ROM normal   Lungs:   No audible wheezing or labored breathing   Chest Wall:    No tenderness or deformity    Heart:    Regular rate and rhythm               Pulses:   2+ and symmetric all extremities   Skin:   Skin color, texture, turgor normal, no rashes or lesions Neurologic:   normal strength, sensation and reflexes     throughout       Musculoskeletal: right lower extremity  · On examination of the right knee there is no effusion, no erythema  Range of motion to full active extension and flexion to greater than 120°  Pain on palpation medial and lateral joint lines  There is crepitus with range of motion, no warmth to palpation, bony enlargement noted  No pain on palpation pes anserine bursa region or distal iliotibial band  Stable to varus and valgus stress without pain or gapping  Negative anterior and posterior drawer testing  Sensation intact distal pulses present  Large joint arthrocentesis: R knee  Universal Protocol:  Consent given by: patient  Patient understanding: patient states understanding of the procedure being performed  Site marked: the operative site was marked  Patient identity confirmed: verbally with patient    Supporting Documentation  Indications: pain   Procedure Details  Location: knee - R knee  Needle size: 22 G  Approach: superior  Medications administered: 3 mL sodium hyaluronate 60 MG/3ML    Patient tolerance: patient tolerated the procedure well with no immediate complications          _*_*_*_*_*_*_*_*_*_*_*_*_*_*_*_*_*_*_*_*_*_*_*_*_*_*_*_*_*_*_*_*_*_*_*_*_*_*_*_*_*    Assessment:  76 y  o male with right knee  Chronic pain due to osteoarthritis    Plan:   · Weight bearing as tolerated  right lower extremity  ·  right knee intra-articular durolane injection given as noted above  · Advised no significant activity or increased ambulation over the next 24-48 hours and warm compresses to the knee no greater 20 minutes 3-4 times 24 hours following the injection  Patient advised should they develop any increasing pain, redness, drainage, numbness, tingling or swelling surrounding the injection sight, they are to contact our office or present to the emergency department    · Follow up in 3 months or sooner if needed      Jossie Metcalf, [place]

## 2021-10-12 ENCOUNTER — LAB (OUTPATIENT)
Dept: LAB | Facility: HOSPITAL | Age: 76
End: 2021-10-12
Payer: MEDICARE

## 2021-10-12 DIAGNOSIS — E11.29 TYPE 2 DIABETES MELLITUS WITH MICROALBUMINURIA, WITHOUT LONG-TERM CURRENT USE OF INSULIN (HCC): ICD-10-CM

## 2021-10-12 DIAGNOSIS — R80.9 TYPE 2 DIABETES MELLITUS WITH MICROALBUMINURIA, WITHOUT LONG-TERM CURRENT USE OF INSULIN (HCC): ICD-10-CM

## 2021-10-12 LAB
EST. AVERAGE GLUCOSE BLD GHB EST-MCNC: 143 MG/DL
HBA1C MFR BLD: 6.6 %

## 2021-10-12 PROCEDURE — 83036 HEMOGLOBIN GLYCOSYLATED A1C: CPT

## 2021-10-12 PROCEDURE — 36415 COLL VENOUS BLD VENIPUNCTURE: CPT

## 2021-10-21 ENCOUNTER — OFFICE VISIT (OUTPATIENT)
Dept: FAMILY MEDICINE CLINIC | Facility: CLINIC | Age: 76
End: 2021-10-21

## 2021-10-21 DIAGNOSIS — Z23 ENCOUNTER FOR IMMUNIZATION: Primary | ICD-10-CM

## 2021-10-21 PROCEDURE — 99999 PR OFFICE/OUTPT VISIT,PROCEDURE ONLY: CPT

## 2021-10-21 PROCEDURE — 91300 SARSCOV2 VAC 30MCG/0.3ML IM: CPT

## 2021-10-22 ENCOUNTER — OFFICE VISIT (OUTPATIENT)
Dept: FAMILY MEDICINE CLINIC | Facility: CLINIC | Age: 76
End: 2021-10-22
Payer: MEDICARE

## 2021-10-22 VITALS
SYSTOLIC BLOOD PRESSURE: 130 MMHG | BODY MASS INDEX: 30.17 KG/M2 | DIASTOLIC BLOOD PRESSURE: 84 MMHG | RESPIRATION RATE: 15 BRPM | HEART RATE: 88 BPM | WEIGHT: 187.7 LBS | HEIGHT: 66 IN

## 2021-10-22 DIAGNOSIS — E78.2 MIXED HYPERLIPIDEMIA: ICD-10-CM

## 2021-10-22 DIAGNOSIS — I77.811 ECTATIC ABDOMINAL AORTA (HCC): ICD-10-CM

## 2021-10-22 DIAGNOSIS — E11.42 DIABETIC POLYNEUROPATHY ASSOCIATED WITH TYPE 2 DIABETES MELLITUS (HCC): ICD-10-CM

## 2021-10-22 DIAGNOSIS — R79.89 CREATININE ELEVATION: ICD-10-CM

## 2021-10-22 DIAGNOSIS — K57.30 DIVERTICULOSIS OF COLON: ICD-10-CM

## 2021-10-22 DIAGNOSIS — E11.29 TYPE 2 DIABETES MELLITUS WITH MICROALBUMINURIA, WITHOUT LONG-TERM CURRENT USE OF INSULIN (HCC): ICD-10-CM

## 2021-10-22 DIAGNOSIS — R80.9 TYPE 2 DIABETES MELLITUS WITH MICROALBUMINURIA, WITHOUT LONG-TERM CURRENT USE OF INSULIN (HCC): ICD-10-CM

## 2021-10-22 DIAGNOSIS — I10 ESSENTIAL HYPERTENSION: ICD-10-CM

## 2021-10-22 DIAGNOSIS — Z12.5 PROSTATE CANCER SCREENING: ICD-10-CM

## 2021-10-22 DIAGNOSIS — Z00.00 MEDICARE ANNUAL WELLNESS VISIT, SUBSEQUENT: Primary | ICD-10-CM

## 2021-10-22 DIAGNOSIS — E55.9 VITAMIN D DEFICIENCY: ICD-10-CM

## 2021-10-22 DIAGNOSIS — K21.9 GASTROESOPHAGEAL REFLUX DISEASE WITHOUT ESOPHAGITIS: ICD-10-CM

## 2021-10-22 PROBLEM — M17.11 OSTEOARTHRITIS OF RIGHT KNEE: Status: ACTIVE | Noted: 2021-10-22

## 2021-10-22 PROCEDURE — 1123F ACP DISCUSS/DSCN MKR DOCD: CPT | Performed by: FAMILY MEDICINE

## 2021-10-22 PROCEDURE — G0439 PPPS, SUBSEQ VISIT: HCPCS | Performed by: FAMILY MEDICINE

## 2021-10-22 PROCEDURE — 99214 OFFICE O/P EST MOD 30 MIN: CPT | Performed by: FAMILY MEDICINE

## 2022-01-21 ENCOUNTER — OFFICE VISIT (OUTPATIENT)
Dept: OBGYN CLINIC | Facility: CLINIC | Age: 77
End: 2022-01-21
Payer: MEDICARE

## 2022-01-21 VITALS
WEIGHT: 190.6 LBS | HEART RATE: 71 BPM | BODY MASS INDEX: 30.63 KG/M2 | HEIGHT: 66 IN | DIASTOLIC BLOOD PRESSURE: 79 MMHG | SYSTOLIC BLOOD PRESSURE: 128 MMHG

## 2022-01-21 DIAGNOSIS — M17.11 PRIMARY OSTEOARTHRITIS OF RIGHT KNEE: Primary | ICD-10-CM

## 2022-01-21 PROCEDURE — 99214 OFFICE O/P EST MOD 30 MIN: CPT | Performed by: ORTHOPAEDIC SURGERY

## 2022-01-21 PROCEDURE — 20610 DRAIN/INJ JOINT/BURSA W/O US: CPT | Performed by: ORTHOPAEDIC SURGERY

## 2022-01-21 RX ORDER — METHYLPREDNISOLONE ACETATE 80 MG/ML
1 INJECTION, SUSPENSION INTRA-ARTICULAR; INTRALESIONAL; INTRAMUSCULAR; SOFT TISSUE
Status: COMPLETED | OUTPATIENT
Start: 2022-01-21 | End: 2022-01-21

## 2022-01-21 RX ORDER — BUPIVACAINE HYDROCHLORIDE 2.5 MG/ML
2 INJECTION, SOLUTION INFILTRATION; PERINEURAL
Status: COMPLETED | OUTPATIENT
Start: 2022-01-21 | End: 2022-01-21

## 2022-01-21 RX ADMIN — BUPIVACAINE HYDROCHLORIDE 2 ML: 2.5 INJECTION, SOLUTION INFILTRATION; PERINEURAL at 10:27

## 2022-01-21 RX ADMIN — METHYLPREDNISOLONE ACETATE 1 ML: 80 INJECTION, SUSPENSION INTRA-ARTICULAR; INTRALESIONAL; INTRAMUSCULAR; SOFT TISSUE at 10:27

## 2022-01-21 NOTE — PROGRESS NOTES
Assessment:  1  Primary osteoarthritis of right knee         Plan:     Patient has chronic right knee pain due to osteoarthritis   Weightbearing as tolerated lower extremity    Patient received a right knee steroid injection with Toradol in the office today   Provided patient with VMO strengthening exercises    Follow-up in three months  The above stated was discussed in layman's terms and the patient expressed understanding  All questions were answered to the patient's satisfaction  Subjective:   Keren Irwin is a 68 y o  male who presents today follow up for right knee pain due to osteoarthritis  He had a right knee Durolane injection on 9/30/21 he felt no relief until two months after the injection and only had one month of relief  He states the pain is better but still having pain  States he has pain going up and down steps  He has been taking over-the-counter NSAIDs as needed for pain        Review of systems negative unless otherwise specified in HPI    Past Medical History:   Diagnosis Date    Abnormal glucose     last assessed [id number]    Anemia     last assessed [id number]    Benign neoplasm of descending colon 12/01/2009    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    unspecified laterality    Diverticulitis of colon 02/22/2011    Ganglion     last assessed [id number]    Gastroenteritis     last assessed [id number]    Glucose intolerance     last assessed [id number]    Hyperesthesia 11/30/2010    Rectal hemorrhage 06/01/2010       Past Surgical History:   Procedure Laterality Date    ADENOIDECTOMY  01/01/1950 1950    INCISIONAL BREAST BIOPSY Left 01/01/1980    L breast did bx 1980    NECK SURGERY  01/01/1950    neck gland removed as child [address]  01/01/1950 1950       Family History   Problem Relation Age of Onset    Alzheimer's disease Mother     Heart attack Father     Alcohol abuse Neg Hx     Substance Abuse Neg Hx        Social History Occupational History    Not on file   Tobacco Use    Smoking status: Former Smoker     Quit date:      Years since quittin 0    Smokeless tobacco: Never Used    Tobacco comment: quit in [id number]   Substance and Sexual Activity    Alcohol use: Yes     Comment: social    Drug use: No    Sexual activity: Not on file         Current Outpatient Medications:     Alpha-Lipoic Acid 600 MG CAPS, Take by mouth, Disp: , Rfl:     Ascorbic Acid (VITAMIN C) 100 MG tablet, Take 1 tablet by mouth daily, Disp: , Rfl:     aspirin 81 mg chewable tablet, Chew 1 tablet daily, Disp: , Rfl:     Cholecalciferol (VITAMIN D3) 400 units CAPS, Take by mouth, Disp: , Rfl:     glucose blood (Randy Contour Next Test) test strip, 1 each by Other route as needed (Use once daily), Disp: 100 each, Rfl: 3    hydrochlorothiazide (HYDRODIURIL) 25 mg tablet, Take 0 5 tablets (12 5 mg total) by mouth daily, Disp: 45 tablet, Rfl: 3    lisinopril (ZESTRIL) 40 mg tablet, TAKE 1 TABLET BY MOUTH  DAILY, Disp: 90 tablet, Rfl: 3    MICROLET LANCETS MISC, Test sugars twice a day, Disp: 100 each, Rfl: 3    multivitamin-minerals (CENTRUM ADULTS) tablet, Take 1 tablet by mouth daily, Disp: , Rfl:     omeprazole (PriLOSEC) 40 MG capsule, TAKE 1 CAPSULE BY MOUTH  DAILY, Disp: 90 capsule, Rfl: 3    simvastatin (ZOCOR) 5 MG tablet, TAKE 1 TABLET BY MOUTH  DAILY AT BEDTIME, Disp: 90 tablet, Rfl: 3    Allergies   Allergen Reactions    Iodine - Food Allergy      Other reaction(s): was told by nurse: heat rash?             Vitals:    22 0959   BP: 128/79   Pulse: 71       Objective:            Physical Exam  Physical Exam:      General Appearance:    Alert, cooperative, no distress, appears stated age   Head:    Normocephalic, without obvious abnormality, atraumatic   Eyes:    conjunctiva/corneas clear, both eyes         Nose:   Nares normal, septum midline, no drainage    Throat:   Lips normal; teeth and gums normal   Neck: symmetrical, trachea midline, ;     thyroid:  no enlargement/   Back:     Symmetric, no curvature, ROM normal   Lungs:   No audible wheezing or labored breathing   Chest Wall:    No tenderness or deformity    Heart:    Regular rate and rhythm                         Pulses:   2+ and symmetric all extremities   Skin:   Skin color, texture, turgor normal, no rashes or lesions   Neurologic:   normal strength, sensation and reflexes     throughout                       Ortho Exam   Right knee   Skin intact  No erythema   No effusion  Range of motion 0-120   Tenderness to palpation over the medial and lateral joint lines  Stable to varus and valgus stress  Neurovascularly Intact Distally     Diagnostics, reviewed and taken today if performed as documented:    None performed       Procedures, if performed today:    Large joint arthrocentesis  Universal Protocol:  Consent: Verbal consent obtained  Risks and benefits: risks, benefits and alternatives were discussed  Consent given by: patient  Time out: Immediately prior to procedure a "time out" was called to verify the correct patient, procedure, equipment, support staff and site/side marked as required    Timeout called at: 1/21/2022 10:24 AM   Patient understanding: patient states understanding of the procedure being performed  Site marked: the operative site was marked  Supporting Documentation  Indications: pain   Procedure Details  Location: knee -   Preparation: Patient was prepped and draped in the usual sterile fashion  Needle size: 22 G  Approach: anterolateral  Medications administered: 1 mL methylPREDNISolone acetate 80 mg/mL; 2 mL bupivacaine 0 25 % (1 ml Toradol )    Patient tolerance: patient tolerated the procedure well with no immediate complications  Dressing:  Sterile dressing applied              Scribe Attestation    I,:  Ricco Chatman am acting as a scribe while in the presence of the attending physician :       I,:  Laurel Morataya MD personally performed the services described in this documentation    as scribed in my presence :             Portions of the record may have been created with voice recognition software  Occasional wrong word or "sound a like" substitutions may have occurred due to the inherent limitations of voice recognition software  Read the chart carefully and recognize, using context, where substitutions have occurred

## 2022-04-05 ENCOUNTER — APPOINTMENT (OUTPATIENT)
Dept: LAB | Facility: HOSPITAL | Age: 77
End: 2022-04-05
Payer: MEDICARE

## 2022-04-05 DIAGNOSIS — E55.9 VITAMIN D DEFICIENCY: ICD-10-CM

## 2022-04-05 DIAGNOSIS — Z12.5 PROSTATE CANCER SCREENING: ICD-10-CM

## 2022-04-05 DIAGNOSIS — E78.2 MIXED HYPERLIPIDEMIA: ICD-10-CM

## 2022-04-05 DIAGNOSIS — R79.89 CREATININE ELEVATION: ICD-10-CM

## 2022-04-05 DIAGNOSIS — I10 ESSENTIAL HYPERTENSION: ICD-10-CM

## 2022-04-05 DIAGNOSIS — R80.9 TYPE 2 DIABETES MELLITUS WITH MICROALBUMINURIA, WITHOUT LONG-TERM CURRENT USE OF INSULIN (HCC): ICD-10-CM

## 2022-04-05 DIAGNOSIS — E11.29 TYPE 2 DIABETES MELLITUS WITH MICROALBUMINURIA, WITHOUT LONG-TERM CURRENT USE OF INSULIN (HCC): ICD-10-CM

## 2022-04-05 LAB
25(OH)D3 SERPL-MCNC: 26 NG/ML (ref 30–100)
ALBUMIN SERPL BCP-MCNC: 4.2 G/DL (ref 3.5–5)
ALP SERPL-CCNC: 68 U/L (ref 46–116)
ALT SERPL W P-5'-P-CCNC: 41 U/L (ref 12–78)
ANION GAP SERPL CALCULATED.3IONS-SCNC: 5 MMOL/L (ref 4–13)
AST SERPL W P-5'-P-CCNC: 23 U/L (ref 5–45)
BACTERIA UR QL AUTO: NORMAL /HPF
BASOPHILS # BLD AUTO: 0.07 THOUSANDS/ΜL (ref 0–0.1)
BASOPHILS NFR BLD AUTO: 1 % (ref 0–1)
BILIRUB SERPL-MCNC: 0.6 MG/DL (ref 0.2–1)
BILIRUB UR QL STRIP: NEGATIVE
BUN SERPL-MCNC: 23 MG/DL (ref 5–25)
CALCIUM SERPL-MCNC: 10 MG/DL (ref 8.3–10.1)
CHLORIDE SERPL-SCNC: 107 MMOL/L (ref 100–108)
CHOLEST SERPL-MCNC: 173 MG/DL
CLARITY UR: CLEAR
CO2 SERPL-SCNC: 26 MMOL/L (ref 21–32)
COLOR UR: YELLOW
CREAT SERPL-MCNC: 1.36 MG/DL (ref 0.6–1.3)
CREAT UR-MCNC: 159 MG/DL
EOSINOPHIL # BLD AUTO: 0.21 THOUSAND/ΜL (ref 0–0.61)
EOSINOPHIL NFR BLD AUTO: 3 % (ref 0–6)
ERYTHROCYTE [DISTWIDTH] IN BLOOD BY AUTOMATED COUNT: 14 % (ref 11.6–15.1)
EST. AVERAGE GLUCOSE BLD GHB EST-MCNC: 134 MG/DL
GFR SERPL CREATININE-BSD FRML MDRD: 50 ML/MIN/1.73SQ M
GLUCOSE P FAST SERPL-MCNC: 155 MG/DL (ref 65–99)
GLUCOSE UR STRIP-MCNC: NEGATIVE MG/DL
HBA1C MFR BLD: 6.3 %
HCT VFR BLD AUTO: 43.1 % (ref 36.5–49.3)
HDLC SERPL-MCNC: 30 MG/DL
HGB BLD-MCNC: 14.6 G/DL (ref 12–17)
HGB UR QL STRIP.AUTO: NEGATIVE
IMM GRANULOCYTES # BLD AUTO: 0.03 THOUSAND/UL (ref 0–0.2)
IMM GRANULOCYTES NFR BLD AUTO: 0 % (ref 0–2)
KETONES UR STRIP-MCNC: NEGATIVE MG/DL
LDLC SERPL CALC-MCNC: 95 MG/DL (ref 0–100)
LEUKOCYTE ESTERASE UR QL STRIP: ABNORMAL
LYMPHOCYTES # BLD AUTO: 1.88 THOUSANDS/ΜL (ref 0.6–4.47)
LYMPHOCYTES NFR BLD AUTO: 22 % (ref 14–44)
MCH RBC QN AUTO: 31.1 PG (ref 26.8–34.3)
MCHC RBC AUTO-ENTMCNC: 33.9 G/DL (ref 31.4–37.4)
MCV RBC AUTO: 92 FL (ref 82–98)
MICROALBUMIN UR-MCNC: 12.4 MG/L (ref 0–20)
MICROALBUMIN/CREAT 24H UR: 8 MG/G CREATININE (ref 0–30)
MONOCYTES # BLD AUTO: 1.04 THOUSAND/ΜL (ref 0.17–1.22)
MONOCYTES NFR BLD AUTO: 12 % (ref 4–12)
NEUTROPHILS # BLD AUTO: 5.23 THOUSANDS/ΜL (ref 1.85–7.62)
NEUTS SEG NFR BLD AUTO: 62 % (ref 43–75)
NITRITE UR QL STRIP: NEGATIVE
NON-SQ EPI CELLS URNS QL MICRO: NORMAL /HPF
NONHDLC SERPL-MCNC: 143 MG/DL
NRBC BLD AUTO-RTO: 0 /100 WBCS
PH UR STRIP.AUTO: 5.5 [PH]
PLATELET # BLD AUTO: 291 THOUSANDS/UL (ref 149–390)
PMV BLD AUTO: 11 FL (ref 8.9–12.7)
POTASSIUM SERPL-SCNC: 4.3 MMOL/L (ref 3.5–5.3)
PROT SERPL-MCNC: 7.9 G/DL (ref 6.4–8.2)
PROT UR STRIP-MCNC: ABNORMAL MG/DL
PSA SERPL-MCNC: 1.5 NG/ML (ref 0–4)
RBC # BLD AUTO: 4.7 MILLION/UL (ref 3.88–5.62)
RBC #/AREA URNS AUTO: NORMAL /HPF
SODIUM SERPL-SCNC: 138 MMOL/L (ref 136–145)
SP GR UR STRIP.AUTO: 1.02 (ref 1–1.03)
TRIGL SERPL-MCNC: 238 MG/DL
TSH SERPL DL<=0.05 MIU/L-ACNC: 1.23 UIU/ML (ref 0.45–4.5)
UROBILINOGEN UR STRIP-ACNC: <2 MG/DL
WBC # BLD AUTO: 8.46 THOUSAND/UL (ref 4.31–10.16)
WBC #/AREA URNS AUTO: NORMAL /HPF

## 2022-04-05 PROCEDURE — 81001 URINALYSIS AUTO W/SCOPE: CPT

## 2022-04-05 PROCEDURE — 82306 VITAMIN D 25 HYDROXY: CPT

## 2022-04-05 PROCEDURE — 85025 COMPLETE CBC W/AUTO DIFF WBC: CPT

## 2022-04-05 PROCEDURE — 80061 LIPID PANEL: CPT

## 2022-04-05 PROCEDURE — 80053 COMPREHEN METABOLIC PANEL: CPT

## 2022-04-05 PROCEDURE — 84443 ASSAY THYROID STIM HORMONE: CPT

## 2022-04-05 PROCEDURE — G0103 PSA SCREENING: HCPCS

## 2022-04-05 PROCEDURE — 36415 COLL VENOUS BLD VENIPUNCTURE: CPT

## 2022-04-05 PROCEDURE — 83036 HEMOGLOBIN GLYCOSYLATED A1C: CPT

## 2022-04-05 PROCEDURE — 82043 UR ALBUMIN QUANTITATIVE: CPT

## 2022-04-05 PROCEDURE — 82570 ASSAY OF URINE CREATININE: CPT

## 2022-04-20 ENCOUNTER — OFFICE VISIT (OUTPATIENT)
Dept: FAMILY MEDICINE CLINIC | Facility: CLINIC | Age: 77
End: 2022-04-20
Payer: MEDICARE

## 2022-04-20 VITALS
SYSTOLIC BLOOD PRESSURE: 122 MMHG | RESPIRATION RATE: 15 BRPM | OXYGEN SATURATION: 98 % | DIASTOLIC BLOOD PRESSURE: 80 MMHG | HEIGHT: 66 IN | WEIGHT: 190 LBS | BODY MASS INDEX: 30.53 KG/M2 | HEART RATE: 70 BPM

## 2022-04-20 DIAGNOSIS — Z01.818 PRE-OP EXAM: Primary | ICD-10-CM

## 2022-04-20 PROCEDURE — 99214 OFFICE O/P EST MOD 30 MIN: CPT | Performed by: NURSE PRACTITIONER

## 2022-04-20 PROCEDURE — 93000 ELECTROCARDIOGRAM COMPLETE: CPT | Performed by: NURSE PRACTITIONER

## 2022-04-20 RX ORDER — PREDNISOLONE ACETATE 10 MG/ML
SUSPENSION/ DROPS OPHTHALMIC
COMMUNITY
Start: 2022-04-19

## 2022-04-20 RX ORDER — OFLOXACIN 3 MG/ML
SOLUTION/ DROPS OPHTHALMIC
COMMUNITY
Start: 2022-04-19

## 2022-04-20 NOTE — PROGRESS NOTES
FAMILY PRACTICE PRE-OPERATIVE EVALUATION  [facility] - [facility]    NAME: Oma Cota  AGE: 68 y o  SEX: male  : 1945     DATE: 2022        Family Practice Pre-Operative Evaluation      Chief Complaint: Pre-operative Evaluation     Surgery: elizabeth cataract w/ lens placement  Anticipated Date of Surgery: 2022 right, 2022 left  Referring Provider: Dr Dirk Kehr     History of Present Illness:     Planned anesthesia is IV sedation  Patient has a bleeding risk of: no recent abnormal bleeding  Current anti-platelet/anti-coagulation medications that the patient is prescribed includes: Aspirin  Assessment of Chronic Conditions:   - Diabetes Mellitus: stable without medication, last A1c at 6 3% on 22  - Hypertension: stable with Lisinopril and HCTZ  - Mixed hyperlipidemia: managed with Simvastatin, total cholesterol 173, Triglycerides 238, HDL 30 & LDL   95  -Creatinine elevation: GFR 50 & Cr  1 36  This is still within patient's own baseline range       Assessment of Cardiac Risk:  · Denies unstable or severe angina or MI in the last 6 weeks or history of stent placement in the last year   · Denies decompensated heart failure (e g  New onset heart failure, NYHA functional class IV heart failure, or worsening existing heart failure)  · Denies significant arrhythmias such as high grade AV block, symptomatic ventricular arrhythmia, newly recognized ventricular tachycardia, supraventricular tachycardia with resting heart rate >100, or symptomatic bradycardia  · Denies severe heart valve disease including aortic stenosis or symptomatic mitral stenosis     Exercise Capacity/shortness of breath symptoms/chest pain symptoms:   (this is only relevant for patients NOT having lower extremity joint replacement, therefore cannot walk the distance)  · Able to walk 4 blocks without symptoms?: Yes  · Able to walk 2 flights without symptoms?: Yes    Prior Anesthesia Reactions: No     Personal history of venous thromboembolic disease? No       Review of Systems:       Constitutional  No fever chills no fatigue no weight loss no weight gain    Mental status  No anxiety or depression and no sleep disturbances no changes in personality or emotional problems no suicidal or homicidal ideations    Eyes  No eye pain no red eyes no visual disturbances no discharge no eye itch    ENT  No earache no hearing loss nasal discharge no sore throat no hoarseness no postnasal drip     Cardio  No chest pain no palpitations no leg edema no claudication no dyspnea on exertion no nocturnal dyspnea    Respiratory  No shortness of breath or wheeze no cough no orthopnea no dyspnea on exertion no hemoptysis no sputum production    GI  No abdominal pain no nausea no vomiting no diarrhea or constipation no bloody stools no change in bowel habits no change in weight      no dysuria hematuria no pyuria no incontinence no pelvic pain    Musculoskeletal  No myalgias arthralgias no joint swelling or stiffness no limb pain or swelling    Skin  No rashes or lesions no itchiness and no wounds    Neuro  No headache dizziness lightheadedness syncope numbness paresthesias or confusion    Heme  No swollen glands no easy bruising            Current Problem List:           Allergies: Allergies   Allergen Reactions    Iodine - Food Allergy      Other reaction(s): was told by nurse: heat rash?        Current Medications:       Current Outpatient Medications:     Alpha-Lipoic Acid 600 MG CAPS, Take by mouth, Disp: , Rfl:     Ascorbic Acid (VITAMIN C) 100 MG tablet, Take 1 tablet by mouth daily, Disp: , Rfl:     aspirin 81 mg chewable tablet, Chew 1 tablet daily, Disp: , Rfl:     Cholecalciferol (VITAMIN D3) 400 units CAPS, Take by mouth, Disp: , Rfl:     glucose blood (Randy Contour Next Test) test strip, 1 each by Other route as needed (Use once daily), Disp: 100 each, Rfl: 3    hydrochlorothiazide (HYDRODIURIL) 25 mg tablet, Take 0 5 tablets (12 5 mg total) by mouth daily, Disp: 45 tablet, Rfl: 3    lisinopril (ZESTRIL) 40 mg tablet, TAKE 1 TABLET BY MOUTH  DAILY, Disp: 90 tablet, Rfl: 3    MICROLET LANCETS MISC, Test sugars twice a day, Disp: 100 each, Rfl: 3    multivitamin-minerals (CENTRUM ADULTS) tablet, Take 1 tablet by mouth daily, Disp: , Rfl:     omeprazole (PriLOSEC) 40 MG capsule, TAKE 1 CAPSULE BY MOUTH  DAILY, Disp: 90 capsule, Rfl: 3    simvastatin (ZOCOR) 5 MG tablet, TAKE 1 TABLET BY MOUTH  DAILY AT BEDTIME, Disp: 90 tablet, Rfl: 3    ofloxacin (OCUFLOX) 0 3 % ophthalmic solution, INSTILL 1 DROP TO OPERATIVE EYE 4 TIMES A DAY AS DIRECTED (Patient not taking: Reported on 4/20/2022), Disp: , Rfl:     prednisoLONE acetate (PRED FORTE) 1 % ophthalmic suspension, INSTILL 1 DROP TO OPERATIVE EYE 4 TIMES A DAY AS DIRECTED (Patient not taking: Reported on 4/20/2022), Disp: , Rfl:     Past Medical History:       Past Medical History:   Diagnosis Date    Abnormal glucose     last assessed [id number]    Anemia     last assessed [id number]    Benign neoplasm of descending colon 12/01/2009    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    unspecified laterality    Diverticulitis of colon 02/22/2011    Ganglion     last assessed [id number]    Gastroenteritis     last assessed [id number]    Glucose intolerance     last assessed [id number]    Hyperesthesia 11/30/2010    Rectal hemorrhage 06/01/2010        Past Surgical History:   Procedure Laterality Date    ADENOIDECTOMY  01/01/1950 1950    INCISIONAL BREAST BIOPSY Left 01/01/1980    L breast did bx 1980    NECK SURGERY  01/01/1950    neck gland removed as child [address]  01/01/1950 1950        Family History   Problem Relation Age of Onset    Alzheimer's disease Mother     Heart attack Father     Alcohol abuse Neg Hx     Substance Abuse Neg Hx         Social History     Socioeconomic History    Marital status: /Civil Union     Spouse name: Not on file    Number of children: Not on file    Years of education: Not on file    Highest education level: Not on file   Occupational History    Not on file   Tobacco Use    Smoking status: Former Smoker     Quit date:      Years since quittin 3    Smokeless tobacco: Never Used    Tobacco comment: quit in [id number]   Substance and Sexual Activity    Alcohol use: Yes     Comment: social    Drug use: No    Sexual activity: Not on file   Other Topics Concern    Not on file   Social History Narrative    Active directive in chart    Always uses seatbelt    Drinks caffeinated tea, 4-6 cups hot tea/day     Social Determinants of Health     Financial Resource Strain: Not on file   Food Insecurity: Not on file   Transportation Needs: Not on file   Physical Activity: Not on file   Stress: Not on file   Social Connections: Not on file   Intimate Partner Violence: Not on file   Housing Stability: Not on file        Physical Exam:     /80   Pulse 70   Resp 15   Ht 5' 5 5" (1 664 m)   Wt 86 2 kg (190 lb)   SpO2 98%   BMI 31 14 kg/m²     Constitutional  Appears healthy, Looks well, Appearance consistent with age    Mental Status  Alert, Oriented, Cooperative, Memory function normal , clean, and reasonable    Neck  No neck mass, No thyromegaly, Good carotid upstrokes bilaterally, trachea midline positive click    Respiratory  Breath sounds normal, No rales, No rhonchi, No wheezing, normal palpation    Cardiac   Regular rhythm without ectopy or murmur no S3-S4, no heave lift or thrill to palpation    Vascular  No leg edema, No pedal edema    Muscular skeletal  No clubbing cyanosis , muscle tone normal    Skin  No appreciable rashes or abnormal appearing lesions        Data:     Pre-operative work-up    Laboratory Results: I have personally reviewed the pertinent laboratory results/reports      EKG: I have personally reviewed pertinent reports  Assessment & Recommendations:     1  Pre-op exam  POCT ECG           Pre-Op Evaluation Plan  1  Further preoperative workup as follows:   - None; no further preoperative work-up is required    2  Medication Management/Recommendations:   - None, continue medication regimen including morning of surgery, with sip of water    3  Patient requires further consultation with: None    Clearance  Patient is CLEARED for surgery without any additional cardiac testing       Timothy He, [address] [place] [address]  [address] [facility] Ashley Funes  [address] [address]  Phone#  [phone]  Fax#  [phone]

## 2022-04-22 ENCOUNTER — OFFICE VISIT (OUTPATIENT)
Dept: OBGYN CLINIC | Facility: CLINIC | Age: 77
End: 2022-04-22
Payer: MEDICARE

## 2022-04-22 VITALS
BODY MASS INDEX: 30.7 KG/M2 | HEART RATE: 97 BPM | SYSTOLIC BLOOD PRESSURE: 136 MMHG | HEIGHT: 66 IN | DIASTOLIC BLOOD PRESSURE: 73 MMHG | WEIGHT: 191 LBS

## 2022-04-22 DIAGNOSIS — M17.11 PRIMARY OSTEOARTHRITIS OF RIGHT KNEE: Primary | ICD-10-CM

## 2022-04-22 PROCEDURE — 20610 DRAIN/INJ JOINT/BURSA W/O US: CPT | Performed by: PHYSICIAN ASSISTANT

## 2022-04-22 PROCEDURE — 99213 OFFICE O/P EST LOW 20 MIN: CPT | Performed by: PHYSICIAN ASSISTANT

## 2022-04-22 RX ORDER — KETOROLAC TROMETHAMINE 30 MG/ML
30 INJECTION, SOLUTION INTRAMUSCULAR; INTRAVENOUS
Status: COMPLETED | OUTPATIENT
Start: 2022-04-22 | End: 2022-04-22

## 2022-04-22 RX ORDER — METHYLPREDNISOLONE ACETATE 40 MG/ML
2 INJECTION, SUSPENSION INTRA-ARTICULAR; INTRALESIONAL; INTRAMUSCULAR; SOFT TISSUE
Status: COMPLETED | OUTPATIENT
Start: 2022-04-22 | End: 2022-04-22

## 2022-04-22 RX ORDER — BUPIVACAINE HYDROCHLORIDE 2.5 MG/ML
2 INJECTION, SOLUTION INFILTRATION; PERINEURAL
Status: COMPLETED | OUTPATIENT
Start: 2022-04-22 | End: 2022-04-22

## 2022-04-22 RX ADMIN — KETOROLAC TROMETHAMINE 30 MG: 30 INJECTION, SOLUTION INTRAMUSCULAR; INTRAVENOUS at 11:26

## 2022-04-22 RX ADMIN — METHYLPREDNISOLONE ACETATE 2 ML: 40 INJECTION, SUSPENSION INTRA-ARTICULAR; INTRALESIONAL; INTRAMUSCULAR; SOFT TISSUE at 11:26

## 2022-04-22 RX ADMIN — BUPIVACAINE HYDROCHLORIDE 2 ML: 2.5 INJECTION, SOLUTION INFILTRATION; PERINEURAL at 11:26

## 2022-04-22 NOTE — PROGRESS NOTES
Orthopedics          Oma Turnerr 68 y o  male MRN: [id number]      Chief Complaint:   right knee pain    HPI:   68 y o male complaining of right knee pain  Patient presents office today regarding right knee pain due to osteoarthritis  Patient has had injections past significant pain relief however pain since returned  Patient did have viscosupplementation injection past without any pain relief  Patient localizes pain is right knee pain is aching nature worse weight-bearing mildly relieved with rest denies instability clicking catching changes numbness tingling right lower extremity                  Review Of Systems:   · Skin: Normal  · Neuro: See HPI  · Musculoskeletal: See HPI  · All other systems reviewed and are negative    Past Medical History:   Past Medical History:   Diagnosis Date    Abnormal glucose     last assessed [id number]    Anemia     last assessed [id number]    Benign neoplasm of descending colon 12/01/2009    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    unspecified laterality    Diverticulitis of colon 02/22/2011    Ganglion     last assessed [id number]    Gastroenteritis     last assessed [id number]    Glucose intolerance     last assessed [id number]    Hyperesthesia 11/30/2010    Rectal hemorrhage 06/01/2010       Past Surgical History:   Past Surgical History:   Procedure Laterality Date    ADENOIDECTOMY  01/01/1950 1950    INCISIONAL BREAST BIOPSY Left 01/01/1980    L breast did bx 1980    NECK SURGERY  01/01/1950    neck gland removed as child [address]   01/01/1950 1950       Family History:  Family history reviewed and non-contributory  Family History   Problem Relation Age of Onset    Alzheimer's disease Mother     Heart attack Father     Alcohol abuse Neg Hx     Substance Abuse Neg Hx          Social History:  Social History     Socioeconomic History    Marital status: /Civil Union     Spouse name: None    Number of children: None    Years of education: None    Highest education level: None   Occupational History    None   Tobacco Use    Smoking status: Former Smoker     Quit date:      Years since quittin 3    Smokeless tobacco: Never Used    Tobacco comment: quit in [id number]   Substance and Sexual Activity    Alcohol use: Yes     Comment: social    Drug use: No    Sexual activity: None   Other Topics Concern    None   Social History Narrative    Active directive in chart    Always uses seatbelt    Drinks caffeinated tea, 4-6 cups hot tea/day     Social Determinants of Health     Financial Resource Strain: Not on file   Food Insecurity: Not on file   Transportation Needs: Not on file   Physical Activity: Not on file   Stress: Not on file   Social Connections: Not on file   Intimate Partner Violence: Not on file   Housing Stability: Not on file       Allergies: Allergies   Allergen Reactions    Iodine - Food Allergy      Other reaction(s): was told by nurse: heat rash?        Labs:  0   Lab Value Date/Time    HCT 43 1 2022 1200    HCT 40 0 2020 0941    HCT 38 7 2020 0945    HCT 40 2 08/10/2015 0946    HCT 41 2 2014 0805    HGB 14 6 2022 1200    HGB 13 4 2020 0941    HGB 12 6 2020 0945    HGB 14 0 08/10/2015 0946    HGB 14 3 2014 0805    WBC 8 46 2022 1200    WBC 7 64 2020 0941    WBC 6 31 2020 0945    WBC 7 24 08/10/2015 0946    WBC 6 44 2014 0805       Meds:    Current Outpatient Medications:     Alpha-Lipoic Acid 600 MG CAPS, Take by mouth, Disp: , Rfl:     Ascorbic Acid (VITAMIN C) 100 MG tablet, Take 1 tablet by mouth daily, Disp: , Rfl:     aspirin 81 mg chewable tablet, Chew 1 tablet daily, Disp: , Rfl:     Cholecalciferol (VITAMIN D3) 400 units CAPS, Take by mouth, Disp: , Rfl:     glucose blood (Randy Contour Next Test) test strip, 1 each by Other route as needed (Use once daily), Disp: 100 each, Rfl: 3    hydrochlorothiazide (HYDRODIURIL) 25 mg tablet, Take 0 5 tablets (12 5 mg total) by mouth daily, Disp: 45 tablet, Rfl: 3    lisinopril (ZESTRIL) 40 mg tablet, TAKE 1 TABLET BY MOUTH  DAILY, Disp: 90 tablet, Rfl: 3    MICROLET LANCETS MISC, Test sugars twice a day, Disp: 100 each, Rfl: 3    multivitamin-minerals (CENTRUM ADULTS) tablet, Take 1 tablet by mouth daily, Disp: , Rfl:     omeprazole (PriLOSEC) 40 MG capsule, TAKE 1 CAPSULE BY MOUTH  DAILY, Disp: 90 capsule, Rfl: 3    simvastatin (ZOCOR) 5 MG tablet, TAKE 1 TABLET BY MOUTH  DAILY AT BEDTIME, Disp: 90 tablet, Rfl: 3    ofloxacin (OCUFLOX) 0 3 % ophthalmic solution, INSTILL 1 DROP TO OPERATIVE EYE 4 TIMES A DAY AS DIRECTED (Patient not taking: Reported on 4/20/2022), Disp: , Rfl:     prednisoLONE acetate (PRED FORTE) 1 % ophthalmic suspension, INSTILL 1 DROP TO OPERATIVE EYE 4 TIMES A DAY AS DIRECTED (Patient not taking: Reported on 4/20/2022), Disp: , Rfl:       Physical Exam:   Vitals:    04/22/22 1039   BP: 136/73   Pulse: 97       General Appearance:    Alert, cooperative, no distress, appears stated age   Head:    Normocephalic, without obvious abnormality, atraumatic   Eyes:    conjunctiva/corneas clear, both eyes        Nose:   Nares normal, septum midline, no drainage    Throat:   Lips normal; teeth and gums normal   Neck:    symmetrical, trachea midline, ;     thyroid:  no enlargement/   Back:     Symmetric, no curvature, ROM normal   Lungs:   No audible wheezing or labored breathing   Chest Wall:    No tenderness or deformity    Heart:    Regular rate and rhythm               Pulses:   2+ and symmetric all extremities   Skin:   Skin color, texture, turgor normal, no rashes or lesions   Neurologic:   normal strength, sensation and reflexes     throughout       Musculoskeletal: right lower extremity  On examination of the right knee there is no effusion, no erythema  Range of motion to full active extension and flexion to greater than 120°    Pain on palpation medial and lateral joint lines  There is crepitus with range of motion, no warmth to palpation, bony enlargement noted  No pain on palpation pes anserine bursa region or distal iliotibial band  Stable to varus and valgus stress without pain or gapping  Negative anterior and posterior drawer testing  Sensation intact distal pulses present  Large joint arthrocentesis: R knee  Universal Protocol:  Consent given by: patient  Patient understanding: patient states understanding of the procedure being performed  Site marked: the operative site was marked  Patient identity confirmed: verbally with patient    Supporting Documentation  Indications: pain   Procedure Details  Location: knee - R knee  Needle size: 22 G  Approach: anterolateral  Medications administered: 2 mL bupivacaine 0 25 %; 2 mL methylPREDNISolone acetate 40 mg/mL; 30 mg ketorolac 30 mg/mL    Patient tolerance: patient tolerated the procedure well with no immediate complications          _*_*_*_*_*_*_*_*_*_*_*_*_*_*_*_*_*_*_*_*_*_*_*_*_*_*_*_*_*_*_*_*_*_*_*_*_*_*_*_*_*    Assessment:  76 y o male with right knee pain due to osteoarthritis    Plan:   · Weight bearing as tolerated  right lower extremity  · Right knee intra-articular corticosteroid and Toradol injection is as noted above  · Patient advised should they develop any increasing pain, redness, drainage, numbness, tingling or swelling surrounding the injection sight, they are to contact our office or present to the emergency department    · Continue home range of motion stretching strengthening exercise  · Case discussed with Dr Mallory Colon  · Follow up in 3 months or sooner if needed      Saray Bass PA-C

## 2022-07-03 DIAGNOSIS — I10 ESSENTIAL HYPERTENSION: ICD-10-CM

## 2022-07-03 DIAGNOSIS — E78.2 MIXED HYPERLIPIDEMIA: ICD-10-CM

## 2022-07-03 DIAGNOSIS — K20.90 ESOPHAGITIS: ICD-10-CM

## 2022-07-04 RX ORDER — SIMVASTATIN 5 MG
TABLET ORAL
Qty: 90 TABLET | Refills: 3 | Status: SHIPPED | OUTPATIENT
Start: 2022-07-04

## 2022-07-04 RX ORDER — OMEPRAZOLE 40 MG/1
CAPSULE, DELAYED RELEASE ORAL
Qty: 90 CAPSULE | Refills: 3 | Status: SHIPPED | OUTPATIENT
Start: 2022-07-04

## 2022-07-05 RX ORDER — LISINOPRIL 40 MG/1
TABLET ORAL
Qty: 90 TABLET | Refills: 3 | Status: SHIPPED | OUTPATIENT
Start: 2022-07-05

## 2022-07-22 ENCOUNTER — OFFICE VISIT (OUTPATIENT)
Dept: FAMILY MEDICINE CLINIC | Facility: CLINIC | Age: 77
End: 2022-07-22
Payer: MEDICARE

## 2022-07-22 VITALS
OXYGEN SATURATION: 98 % | WEIGHT: 192.8 LBS | HEIGHT: 66 IN | SYSTOLIC BLOOD PRESSURE: 124 MMHG | DIASTOLIC BLOOD PRESSURE: 72 MMHG | RESPIRATION RATE: 15 BRPM | BODY MASS INDEX: 30.98 KG/M2 | HEART RATE: 80 BPM | TEMPERATURE: 98.3 F

## 2022-07-22 DIAGNOSIS — J02.9 SORE THROAT: Primary | ICD-10-CM

## 2022-07-22 DIAGNOSIS — B37.0 ORAL THRUSH: ICD-10-CM

## 2022-07-22 LAB — S PYO AG THROAT QL: NEGATIVE

## 2022-07-22 PROCEDURE — 87070 CULTURE OTHR SPECIMN AEROBIC: CPT | Performed by: NURSE PRACTITIONER

## 2022-07-22 PROCEDURE — 99214 OFFICE O/P EST MOD 30 MIN: CPT | Performed by: NURSE PRACTITIONER

## 2022-07-22 PROCEDURE — 87880 STREP A ASSAY W/OPTIC: CPT | Performed by: NURSE PRACTITIONER

## 2022-07-22 RX ORDER — 1.1% SODIUM FLUORIDE PRESCRIPTION DENTAL CREAM 5 MG/G
CREAM DENTAL
COMMUNITY
Start: 2022-06-01

## 2022-07-22 NOTE — PROGRESS NOTES
Assessment/Plan:     Diagnoses and all orders for this visit:    Sore throat  -     POCT rapid strepA  -     Throat culture    Rapid strep negative  Throat culture sent  Patient to start Nystatin  See below for treatment plan  Oral thrush  -     nystatin (MYCOSTATIN) 500,000 units/5 mL suspension; Apply 5 mL (500,000 Units total) to the mouth or throat 4 (four) times a day for 14 days    Nystatin prescribed  Medication and s/e reviewed  Patient is to swish and swallow this medication  He may utilize warm salt water gargles as well  Patient is encouraged to call our office for any questions/concerns, persistent or worsening symptoms  Patient states they understand and agree with treatment plan  Pt to f/u PRN  Subjective:      Patient ID: Flash Zimmer is a 68 y o  male  Patient presents for several days of painful swallowing, sore throat, and a tender tongue  He notes he tested for covid at home this morning and it was negative  He denies fever, chills, body aches, headaches or other URI symptoms  No recent abx or steriod use  The following portions of the patient's history were reviewed and updated as appropriate: allergies, current medications, past family history, past medical history, past social history, past surgical history and problem list     Review of Systems    As noted per HPI  Objective:      /72   Pulse 80   Temp 98 3 °F (36 8 °C) (Oral)   Resp 15   Ht 5' 5 5" (1 664 m)   Wt 87 5 kg (192 lb 12 8 oz)   SpO2 98%   BMI 31 60 kg/m²          Physical Exam  Constitutional:       General: He is not in acute distress  Appearance: Normal appearance  He is well-developed  He is not ill-appearing  HENT:      Right Ear: Tympanic membrane and ear canal normal       Left Ear: Tympanic membrane and ear canal normal       Nose: No congestion or rhinorrhea  Mouth/Throat:      Mouth: Mucous membranes are dry  Pharynx: Posterior oropharyngeal erythema present  Comments: Raised white lesions located bilaterally to posterior soft palate with erythematic base  Cardiovascular:      Rate and Rhythm: Normal rate and regular rhythm  Heart sounds: Normal heart sounds  No murmur heard  Pulmonary:      Effort: Pulmonary effort is normal  No respiratory distress  Breath sounds: No wheezing  Neurological:      Mental Status: He is alert and oriented to person, place, and time  Mental status is at baseline  Psychiatric:         Mood and Affect: Mood normal          Behavior: Behavior normal          Thought Content:  Thought content normal          Judgment: Judgment normal

## 2022-07-24 LAB — BACTERIA THROAT CULT: NORMAL

## 2022-07-25 ENCOUNTER — TELEPHONE (OUTPATIENT)
Dept: FAMILY MEDICINE CLINIC | Facility: CLINIC | Age: 77
End: 2022-07-25

## 2022-07-25 NOTE — TELEPHONE ENCOUNTER
----- Message from Dilip Espinosa, [address] sent at 7/25/2022  7:32 AM EDT -----  Please let patient know his throat culture was negative for strep and ask how he is feeling  Thank you! Patient was informed, feeling better

## 2022-08-05 ENCOUNTER — OFFICE VISIT (OUTPATIENT)
Dept: OBGYN CLINIC | Facility: CLINIC | Age: 77
End: 2022-08-05
Payer: MEDICARE

## 2022-08-05 VITALS
SYSTOLIC BLOOD PRESSURE: 135 MMHG | HEIGHT: 66 IN | BODY MASS INDEX: 31.02 KG/M2 | WEIGHT: 193 LBS | HEART RATE: 76 BPM | DIASTOLIC BLOOD PRESSURE: 74 MMHG

## 2022-08-05 DIAGNOSIS — M17.11 PRIMARY OSTEOARTHRITIS OF RIGHT KNEE: Primary | ICD-10-CM

## 2022-08-05 PROCEDURE — 99213 OFFICE O/P EST LOW 20 MIN: CPT | Performed by: PHYSICIAN ASSISTANT

## 2022-08-05 PROCEDURE — 20610 DRAIN/INJ JOINT/BURSA W/O US: CPT | Performed by: PHYSICIAN ASSISTANT

## 2022-08-05 RX ORDER — BUPIVACAINE HYDROCHLORIDE 2.5 MG/ML
2 INJECTION, SOLUTION INFILTRATION; PERINEURAL
Status: COMPLETED | OUTPATIENT
Start: 2022-08-05 | End: 2022-08-05

## 2022-08-05 RX ORDER — METHYLPREDNISOLONE ACETATE 40 MG/ML
2 INJECTION, SUSPENSION INTRA-ARTICULAR; INTRALESIONAL; INTRAMUSCULAR; SOFT TISSUE
Status: COMPLETED | OUTPATIENT
Start: 2022-08-05 | End: 2022-08-05

## 2022-08-05 RX ORDER — KETOROLAC TROMETHAMINE 30 MG/ML
30 INJECTION, SOLUTION INTRAMUSCULAR; INTRAVENOUS
Status: COMPLETED | OUTPATIENT
Start: 2022-08-05 | End: 2022-08-05

## 2022-08-05 RX ADMIN — METHYLPREDNISOLONE ACETATE 2 ML: 40 INJECTION, SUSPENSION INTRA-ARTICULAR; INTRALESIONAL; INTRAMUSCULAR; SOFT TISSUE at 10:52

## 2022-08-05 RX ADMIN — BUPIVACAINE HYDROCHLORIDE 2 ML: 2.5 INJECTION, SOLUTION INFILTRATION; PERINEURAL at 10:52

## 2022-08-05 RX ADMIN — KETOROLAC TROMETHAMINE 30 MG: 30 INJECTION, SOLUTION INTRAMUSCULAR; INTRAVENOUS at 10:52

## 2022-08-05 NOTE — PROGRESS NOTES
Orthopedics          Oma STACEY Beata 68 y o  male MRN: [id number]      Chief Complaint:   right knee pain    HPI:   68 y o male complaining of right knee pain  Patient presents office today regarding right knee pain  Patient states the pain is right knee is aching nature worse weight-bearing mildly relieved with rest   Patient has previously been treated for right knee pain due to osteoarthritis  Patient did receive intra-articular corticosteroid injection with Toradol  3 and half months ago with significant pain relief however the past few weeks time noted increasing pain localized his right knee is aching nature worse weight-bearing mildly relieved with rest   Denies any radiation pain denies any clicking popping catching denies any changes numbness tingling right lower extremity                  Review Of Systems:   · Skin: Normal  · Neuro: See HPI  · Musculoskeletal: See HPI  · All other systems reviewed and are negative    Past Medical History:   Past Medical History:   Diagnosis Date    Abnormal glucose     last assessed [id number]    Anemia     last assessed [id number]    Benign neoplasm of descending colon 12/01/2009    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    unspecified laterality    Diverticulitis of colon 02/22/2011    Ganglion     last assessed [id number]    Gastroenteritis     last assessed [id number]    Glucose intolerance     last assessed [id number]    Hyperesthesia 11/30/2010    Rectal hemorrhage 06/01/2010       Past Surgical History:   Past Surgical History:   Procedure Laterality Date    ADENOIDECTOMY  01/01/1950 1950    INCISIONAL BREAST BIOPSY Left 01/01/1980    L breast did bx 1980    NECK SURGERY  01/01/1950    neck gland removed as child [address]  01/01/1950 1950       Family History:  Family history reviewed and non-contributory  Family History   Problem Relation Age of Onset    Alzheimer's disease Mother     Heart attack Father     Alcohol abuse Neg Hx     Substance Abuse Neg Hx          Social History:  Social History     Socioeconomic History    Marital status: /Civil Union     Spouse name: None    Number of children: None    Years of education: None    Highest education level: None   Occupational History    None   Tobacco Use    Smoking status: Former Smoker     Quit date:      Years since quittin 6    Smokeless tobacco: Never Used    Tobacco comment: quit in [id number]   Substance and Sexual Activity    Alcohol use: Yes     Comment: social    Drug use: No    Sexual activity: None   Other Topics Concern    None   Social History Narrative    Active directive in chart    Always uses seatbelt    Drinks caffeinated tea, 4-6 cups hot tea/day     Social Determinants of Health     Financial Resource Strain: Not on file   Food Insecurity: Not on file   Transportation Needs: Not on file   Physical Activity: Not on file   Stress: Not on file   Social Connections: Not on file   Intimate Partner Violence: Not on file   Housing Stability: Not on file       Allergies: Allergies   Allergen Reactions    Iodine - Food Allergy      Other reaction(s): was told by nurse: heat rash?        Labs:  0   Lab Value Date/Time    HCT 43 1 2022 1200    HCT 40 0 2020 0941    HCT 38 7 2020 0945    HCT 40 2 08/10/2015 0946    HCT 41 2 2014 0805    HGB 14 6 2022 1200    HGB 13 4 2020 0941    HGB 12 6 2020 0945    HGB 14 0 08/10/2015 0946    HGB 14 3 2014 0805    WBC 8 46 2022 1200    WBC 7 64 2020 0941    WBC 6 31 2020 0945    WBC 7 24 08/10/2015 0946    WBC 6 44 2014 0805       Meds:    Current Outpatient Medications:     Alpha-Lipoic Acid 600 MG CAPS, Take by mouth, Disp: , Rfl:     Ascorbic Acid (VITAMIN C) 100 MG tablet, Take 1 tablet by mouth daily, Disp: , Rfl:     aspirin 81 mg chewable tablet, Chew 1 tablet daily, Disp: , Rfl:     Cholecalciferol (VITAMIN D3) 400 units CAPS, Take by mouth, Disp: , Rfl:     glucose blood (Randy Contour Next Test) test strip, 1 each by Other route as needed (Use once daily), Disp: 100 each, Rfl: 3    hydrochlorothiazide (HYDRODIURIL) 25 mg tablet, Take 0 5 tablets (12 5 mg total) by mouth daily, Disp: 45 tablet, Rfl: 3    lisinopril (ZESTRIL) 40 mg tablet, TAKE 1 TABLET BY MOUTH  DAILY, Disp: 90 tablet, Rfl: 3    MICROLET LANCETS MISC, Test sugars twice a day, Disp: 100 each, Rfl: 3    multivitamin-minerals (CENTRUM ADULTS) tablet, Take 1 tablet by mouth daily, Disp: , Rfl:     nystatin (MYCOSTATIN) 500,000 units/5 mL suspension, Apply 5 mL (500,000 Units total) to the mouth or throat 4 (four) times a day for 14 days, Disp: 280 mL, Rfl: 0    ofloxacin (OCUFLOX) 0 3 % ophthalmic solution, INSTILL 1 DROP TO OPERATIVE EYE 4 TIMES A DAY AS DIRECTED, Disp: , Rfl:     omeprazole (PriLOSEC) 40 MG capsule, TAKE 1 CAPSULE BY MOUTH  DAILY, Disp: 90 capsule, Rfl: 3    prednisoLONE acetate (PRED FORTE) 1 % ophthalmic suspension, INSTILL 1 DROP TO OPERATIVE EYE 4 TIMES A DAY AS DIRECTED, Disp: , Rfl:     SF 5000 Plus 1 1 % CREA, BRUSH TWICE A DAY AND DO NOT DRINK AFTER BRUSHING AT NIGHT, Disp: , Rfl:     simvastatin (ZOCOR) 5 MG tablet, TAKE 1 TABLET BY MOUTH  DAILY AT BEDTIME, Disp: 90 tablet, Rfl: 3      Physical Exam:   Vitals:    08/05/22 1021   BP: 135/74   Pulse: 76       General Appearance:    Alert, cooperative, no distress, appears stated age   Head:    Normocephalic, without obvious abnormality, atraumatic   Eyes:    conjunctiva/corneas clear, both eyes        Nose:   Nares normal, septum midline, no drainage    Throat:   Lips normal; teeth and gums normal   Neck:    symmetrical, trachea midline, ;     thyroid:  no enlargement/   Back:     Symmetric, no curvature, ROM normal   Lungs:   No audible wheezing or labored breathing   Chest Wall:    No tenderness or deformity    Heart:    Regular rate and rhythm               Pulses:   2+ and symmetric all extremities   Skin:   Skin color, texture, turgor normal, no rashes or lesions   Neurologic:   normal strength, sensation and reflexes     throughout       Musculoskeletal: right lower extremity  · On examination of the right knee there is no effusion, no erythema  Range of motion to full active extension and flexion to greater than 120°  Pain on palpation medial and lateral joint lines  There is crepitus with range of motion, no warmth to palpation, bony enlargement noted  No pain on palpation pes anserine bursa region or distal iliotibial band  Stable to varus and valgus stress without pain or gapping  Negative anterior and posterior drawer testing  Sensation intact distal pulses present  Large joint arthrocentesis: R knee  Universal Protocol:  Consent given by: patient  Patient understanding: patient states understanding of the procedure being performed  Site marked: the operative site was marked  Patient identity confirmed: verbally with patient    Supporting Documentation  Indications: pain   Procedure Details  Location: knee - R knee  Needle size: 22 G  Approach: superior  Medications administered: 2 mL bupivacaine 0 25 %; 2 mL methylPREDNISolone acetate 40 mg/mL; 30 mg ketorolac 30 mg/mL    Patient tolerance: patient tolerated the procedure well with no immediate complications          _*_*_*_*_*_*_*_*_*_*_*_*_*_*_*_*_*_*_*_*_*_*_*_*_*_*_*_*_*_*_*_*_*_*_*_*_*_*_*_*_*    Assessment:  76 y o male with right knee pain due to osteoarthritis    Plan:   · Weight bearing as tolerated  right lower extremity  · Right knee intra-articular corticosteroid and Toradol injection at stress noted above  · Patient advised should they develop any increasing pain, redness, drainage, numbness, tingling or swelling surrounding the injection sight, they are to contact our office or present to the emergency department    · Case discussed with Dr Alirio Infante  · Continue home range of motion stretching strengthening exercises  · Patient is aware should his pain worsen and should he fail conservative managed she may be candidate for right total knee arthroplasty  ·  Follow up in 3 months or sooner if needed      Chrissie Hollins PA-C

## 2022-10-05 ENCOUNTER — OFFICE VISIT (OUTPATIENT)
Dept: FAMILY MEDICINE CLINIC | Facility: CLINIC | Age: 77
End: 2022-10-05
Payer: MEDICARE

## 2022-10-05 VITALS
DIASTOLIC BLOOD PRESSURE: 84 MMHG | SYSTOLIC BLOOD PRESSURE: 140 MMHG | BODY MASS INDEX: 31.92 KG/M2 | RESPIRATION RATE: 18 BRPM | OXYGEN SATURATION: 97 % | TEMPERATURE: 99.4 F | WEIGHT: 194.8 LBS | HEART RATE: 80 BPM

## 2022-10-05 DIAGNOSIS — J20.9 ACUTE BRONCHITIS, UNSPECIFIED ORGANISM: Primary | ICD-10-CM

## 2022-10-05 PROCEDURE — 99214 OFFICE O/P EST MOD 30 MIN: CPT | Performed by: NURSE PRACTITIONER

## 2022-10-05 RX ORDER — FLUTICASONE PROPIONATE 50 MCG
1 SPRAY, SUSPENSION (ML) NASAL DAILY
Qty: 9.9 ML | Refills: 1 | Status: SHIPPED | OUTPATIENT
Start: 2022-10-05

## 2022-10-05 RX ORDER — AZITHROMYCIN 250 MG/1
TABLET, FILM COATED ORAL
Qty: 6 TABLET | Refills: 0 | Status: SHIPPED | OUTPATIENT
Start: 2022-10-05 | End: 2022-10-10

## 2022-10-05 RX ORDER — GUAIFENESIN 600 MG/1
1200 TABLET, EXTENDED RELEASE ORAL EVERY 12 HOURS SCHEDULED
Qty: 28 TABLET | Refills: 0 | Status: SHIPPED | OUTPATIENT
Start: 2022-10-05

## 2022-10-05 RX ORDER — ALBUTEROL SULFATE 90 UG/1
2 AEROSOL, METERED RESPIRATORY (INHALATION) EVERY 6 HOURS PRN
Qty: 8 G | Refills: 1 | Status: SHIPPED | OUTPATIENT
Start: 2022-10-05

## 2022-10-05 NOTE — PROGRESS NOTES
Assessment/Plan:     Diagnoses and all orders for this visit:    Acute bronchitis, unspecified organism  -     azithromycin (Zithromax) 250 mg tablet; Take 2 tablets (500 mg total) by mouth daily for 1 day, THEN 1 tablet (250 mg total) daily for 4 days  -     albuterol (PROVENTIL HFA,VENTOLIN HFA) 90 mcg/act inhaler; Inhale 2 puffs every 6 (six) hours as needed for wheezing or shortness of breath  -     guaiFENesin (MUCINEX) 600 mg 12 hr tablet; Take 2 tablets (1,200 mg total) by mouth every 12 (twelve) hours  -     fluticasone (FLONASE) 50 mcg/act nasal spray; 1 spray into each nostril daily      Pt to start Zpak  Medication and s/e reviewed  I would also like him to start Mucinex BID with full glass of water, Flonase nasal spray, 2 sprays in each nostril daily  Additionally, he may use Albuterol inhaler PRN  All medication and s/e reviewed  If he notes that his symptoms have persisted or worsened by middle of next week, he is to contact our office and we can consider CXR  Patient is encouraged to call our office for any questions/concerns, persistent or worsening symptoms  Patient states they understand and agree with treatment plan  Pt to f/u PRN  Subjective:      Patient ID: Steff Cunningham is a 68 y o  male  Pt presents today for a cough that he has noted since July  He notes it can be productive with mucus  He denies SOB, CP, fever, chills, body aches  He does note an occasional sore throat but denies any pain with swallowing  He takes an allergy medication daily  He denies any dyspepsia  He did have negative covid test at home  The following portions of the patient's history were reviewed and updated as appropriate: allergies, current medications, past family history, past medical history, past social history, past surgical history and problem list     Review of Systems    As noted per HPI      Objective:      /84   Pulse 80   Temp 99 4 °F (37 4 °C) (Oral)   Resp 18   Wt 88 4 kg (194 lb 12 8 oz)   SpO2 97%   BMI 31 92 kg/m²          Physical Exam  Vitals reviewed  Constitutional:       General: He is not in acute distress  Appearance: Normal appearance  He is not ill-appearing  HENT:      Right Ear: Tympanic membrane, ear canal and external ear normal       Left Ear: Tympanic membrane, ear canal and external ear normal       Nose: No congestion or rhinorrhea  Mouth/Throat:      Pharynx: No oropharyngeal exudate or posterior oropharyngeal erythema  Cardiovascular:      Rate and Rhythm: Normal rate and regular rhythm  Pulses: Normal pulses  Heart sounds: Normal heart sounds  No murmur heard  Pulmonary:      Effort: Pulmonary effort is normal  No respiratory distress  Breath sounds: Normal breath sounds  No wheezing  Lymphadenopathy:      Cervical: No cervical adenopathy  Neurological:      Mental Status: He is alert and oriented to person, place, and time  Mental status is at baseline  Psychiatric:         Mood and Affect: Mood normal          Behavior: Behavior normal          Thought Content:  Thought content normal          Judgment: Judgment normal

## 2022-11-11 ENCOUNTER — OFFICE VISIT (OUTPATIENT)
Dept: OBGYN CLINIC | Facility: CLINIC | Age: 77
End: 2022-11-11

## 2022-11-11 VITALS
BODY MASS INDEX: 31.66 KG/M2 | DIASTOLIC BLOOD PRESSURE: 70 MMHG | SYSTOLIC BLOOD PRESSURE: 112 MMHG | HEIGHT: 66 IN | HEART RATE: 81 BPM | WEIGHT: 197 LBS

## 2022-11-11 DIAGNOSIS — M25.561 CHRONIC PAIN OF RIGHT KNEE: ICD-10-CM

## 2022-11-11 DIAGNOSIS — G89.29 CHRONIC PAIN OF RIGHT KNEE: ICD-10-CM

## 2022-11-11 DIAGNOSIS — M17.11 PRIMARY OSTEOARTHRITIS OF RIGHT KNEE: Primary | ICD-10-CM

## 2022-11-11 RX ORDER — BUPIVACAINE HYDROCHLORIDE 2.5 MG/ML
2 INJECTION, SOLUTION INFILTRATION; PERINEURAL
Status: COMPLETED | OUTPATIENT
Start: 2022-11-11 | End: 2022-11-11

## 2022-11-11 RX ORDER — METHYLPREDNISOLONE ACETATE 40 MG/ML
2 INJECTION, SUSPENSION INTRA-ARTICULAR; INTRALESIONAL; INTRAMUSCULAR; SOFT TISSUE
Status: COMPLETED | OUTPATIENT
Start: 2022-11-11 | End: 2022-11-11

## 2022-11-11 RX ORDER — KETOROLAC TROMETHAMINE 30 MG/ML
30 INJECTION, SOLUTION INTRAMUSCULAR; INTRAVENOUS
Status: COMPLETED | OUTPATIENT
Start: 2022-11-11 | End: 2022-11-11

## 2022-11-11 RX ADMIN — METHYLPREDNISOLONE ACETATE 2 ML: 40 INJECTION, SUSPENSION INTRA-ARTICULAR; INTRALESIONAL; INTRAMUSCULAR; SOFT TISSUE at 11:24

## 2022-11-11 RX ADMIN — KETOROLAC TROMETHAMINE 30 MG: 30 INJECTION, SOLUTION INTRAMUSCULAR; INTRAVENOUS at 11:24

## 2022-11-11 RX ADMIN — BUPIVACAINE HYDROCHLORIDE 2 ML: 2.5 INJECTION, SOLUTION INFILTRATION; PERINEURAL at 11:24

## 2022-11-11 NOTE — PROGRESS NOTES
Assessment:  1  Primary osteoarthritis of right knee     2  Chronic pain of right knee         Plan:    • Patient has chronic right knee pain due to osteoarthritis   • Weightbearing as tolerated lower extremity   • Patient received a right knee steroid injection in the office today   • Follow up 3 months         The above stated was discussed in layman's terms and the patient expressed understanding  All questions were answered to the patient's satisfaction  Subjective:   Leilani James is a 68 y o  male who presents today follow-up for chronic right knee pain due  to osteoarthritis  Patient had a right knee steroid injection on 8/5/22 until relief until recently  He is having pain in the medial aspect of the right knee  Denies any instability  Pain is worse with increased activities  He does occasionally exercises for pain relief  He is currently not taking pain medications  He denies any numbness or tingling        Review of systems negative unless otherwise specified in HPI    Past Medical History:   Diagnosis Date   • Abnormal glucose     last assessed [id number]   • Anemia     last assessed [id number]   • Benign neoplasm of descending colon 12/01/2009   • 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    unspecified laterality   • Diverticulitis of colon 02/22/2011   • Ganglion     last assessed [id number]   • Gastroenteritis     last assessed [id number]   • Glucose intolerance     last assessed [id number]   • Hyperesthesia 11/30/2010   • Rectal hemorrhage 06/01/2010       Past Surgical History:   Procedure Laterality Date   • ADENOIDECTOMY  01/01/1950 1950   • INCISIONAL BREAST BIOPSY Left 01/01/1980    L breast did bx 1980   • NECK SURGERY  01/01/1950    neck gland removed as child 1950   • TONSILLECTOMY  01/01/1950 1950       Family History   Problem Relation Age of Onset   • Alzheimer's disease Mother    • Heart attack Father    • Alcohol abuse Neg Hx    • Substance Abuse Neg Hx        Social History Occupational History   • Not on file   Tobacco Use   • Smoking status: Former Smoker     Quit date:      Years since quittin 8   • Smokeless tobacco: Never Used   • Tobacco comment: quit in [id number]   Substance and Sexual Activity   • Alcohol use: Yes     Comment: social   • Drug use: No   • Sexual activity: Not on file         Current Outpatient Medications:   •  albuterol (PROVENTIL HFA,VENTOLIN HFA) 90 mcg/act inhaler, Inhale 2 puffs every 6 (six) hours as needed for wheezing or shortness of breath, Disp: 8 g, Rfl: 1  •  Alpha-Lipoic Acid 600 MG CAPS, Take by mouth, Disp: , Rfl:   •  Ascorbic Acid (VITAMIN C) 100 MG tablet, Take 1 tablet by mouth daily (Patient not taking: No sig reported), Disp: , Rfl:   •  aspirin 81 mg chewable tablet, Chew 1 tablet daily, Disp: , Rfl:   •  Cholecalciferol (VITAMIN D3) 400 units CAPS, Take by mouth, Disp: , Rfl:   •  fluticasone (FLONASE) 50 mcg/act nasal spray, 1 spray into each nostril daily, Disp: 9 9 mL, Rfl: 1  •  glucose blood (Randy Contour Next Test) test strip, 1 each by Other route as needed (Use once daily), Disp: 100 each, Rfl: 3  •  guaiFENesin (MUCINEX) 600 mg 12 hr tablet, Take 2 tablets (1,200 mg total) by mouth every 12 (twelve) hours, Disp: 28 tablet, Rfl: 0  •  hydrochlorothiazide (HYDRODIURIL) 25 mg tablet, Take 0 5 tablets (12 5 mg total) by mouth daily, Disp: 45 tablet, Rfl: 3  •  lisinopril (ZESTRIL) 40 mg tablet, TAKE 1 TABLET BY MOUTH  DAILY, Disp: 90 tablet, Rfl: 3  •  MICROLET LANCETS MISC, Test sugars twice a day, Disp: 100 each, Rfl: 3  •  multivitamin-minerals (CENTRUM ADULTS) tablet, Take 1 tablet by mouth daily, Disp: , Rfl:   •  ofloxacin (OCUFLOX) 0 3 % ophthalmic solution, INSTILL 1 DROP TO OPERATIVE EYE 4 TIMES A DAY AS DIRECTED, Disp: , Rfl:   •  omeprazole (PriLOSEC) 40 MG capsule, TAKE 1 CAPSULE BY MOUTH  DAILY, Disp: 90 capsule, Rfl: 3  •  prednisoLONE acetate (PRED FORTE) 1 % ophthalmic suspension, INSTILL 1 DROP TO OPERATIVE EYE 4 TIMES A DAY AS DIRECTED, Disp: , Rfl:   •  SF 5000 Plus 1 1 % CREA, BRUSH TWICE A DAY AND DO NOT DRINK AFTER BRUSHING AT NIGHT, Disp: , Rfl:   •  simvastatin (ZOCOR) 5 MG tablet, TAKE 1 TABLET BY MOUTH  DAILY AT BEDTIME, Disp: 90 tablet, Rfl: 3    Allergies   Allergen Reactions   • Iodine - Food Allergy      Other reaction(s): was told by nurse: heat rash? Vitals:    11/11/22 1039   BP: 112/70   Pulse: 81       Objective:            Physical Exam  Physical Exam:      General Appearance:    Alert, cooperative, no distress, appears stated age   Head:    Normocephalic, without obvious abnormality, atraumatic   Eyes:    conjunctiva/corneas clear, both eyes         Nose:   Nares normal, septum midline, no drainage    Throat:   Lips normal; teeth and gums normal   Neck:    symmetrical, trachea midline, ;     thyroid:  no enlargement/   Back:     Symmetric, no curvature, ROM normal   Lungs:   No audible wheezing or labored breathing   Chest Wall:    No tenderness or deformity    Heart:    Regular rate and rhythm                         Pulses:   2+ and symmetric all extremities   Skin:   Skin color, texture, turgor normal, no rashes or lesions   Neurologic:   normal strength, sensation and reflexes     throughout                       Ortho Exam  Right knee   Skin intact   No erythema   Varus alignment of knee joint   Full extension   TTP medial joint line   Stable to varus and valgus stress   Neurovascularly Intact Distally     Diagnostics, reviewed and taken today if performed as documented:    None performed            Procedures, if performed today:    Large joint arthrocentesis: R knee  Universal Protocol:  Consent: Verbal consent obtained    Risks and benefits: risks, benefits and alternatives were discussed  Consent given by: patient  Time out: Immediately prior to procedure a "time out" was called to verify the correct patient, procedure, equipment, support staff and site/side marked as required  Timeout called at: 11/11/2022 11:22 AM   Patient understanding: patient states understanding of the procedure being performed  Site marked: the operative site was marked  Supporting Documentation  Indications: pain   Procedure Details  Location: knee - R knee  Preparation: Patient was prepped and draped in the usual sterile fashion  Needle size: 22 G  Approach: anterolateral  Medications administered: 2 mL bupivacaine 0 25 %; 2 mL methylPREDNISolone acetate 40 mg/mL; 30 mg ketorolac 30 mg/mL    Patient tolerance: patient tolerated the procedure well with no immediate complications  Dressing:  Sterile dressing applied          Scribe Attestation    I,:  Yodit Chatman am acting as a scribe while in the presence of the attending physician :       I,:  Marly Moran MD personally performed the services described in this documentation    as scribed in my presence :             Portions of the record may have been created with voice recognition software  Occasional wrong word or "sound a like" substitutions may have occurred due to the inherent limitations of voice recognition software  Read the chart carefully and recognize, using context, where substitutions have occurred

## 2022-12-20 LAB
LEFT EYE DIABETIC RETINOPATHY: NORMAL
RIGHT EYE DIABETIC RETINOPATHY: NORMAL

## 2023-02-17 ENCOUNTER — OFFICE VISIT (OUTPATIENT)
Dept: OBGYN CLINIC | Facility: CLINIC | Age: 78
End: 2023-02-17

## 2023-02-17 VITALS
HEIGHT: 66 IN | WEIGHT: 194 LBS | SYSTOLIC BLOOD PRESSURE: 135 MMHG | DIASTOLIC BLOOD PRESSURE: 69 MMHG | HEART RATE: 73 BPM | BODY MASS INDEX: 31.18 KG/M2

## 2023-02-17 DIAGNOSIS — G89.29 CHRONIC PAIN OF RIGHT KNEE: ICD-10-CM

## 2023-02-17 DIAGNOSIS — M25.561 CHRONIC PAIN OF RIGHT KNEE: ICD-10-CM

## 2023-02-17 DIAGNOSIS — M17.11 PRIMARY OSTEOARTHRITIS OF RIGHT KNEE: Primary | ICD-10-CM

## 2023-02-17 RX ORDER — BUPIVACAINE HYDROCHLORIDE 2.5 MG/ML
1 INJECTION, SOLUTION INFILTRATION; PERINEURAL
Status: COMPLETED | OUTPATIENT
Start: 2023-02-17 | End: 2023-02-17

## 2023-02-17 RX ORDER — METHYLPREDNISOLONE ACETATE 40 MG/ML
2 INJECTION, SUSPENSION INTRA-ARTICULAR; INTRALESIONAL; INTRAMUSCULAR; SOFT TISSUE
Status: COMPLETED | OUTPATIENT
Start: 2023-02-17 | End: 2023-02-17

## 2023-02-17 RX ADMIN — METHYLPREDNISOLONE ACETATE 2 ML: 40 INJECTION, SUSPENSION INTRA-ARTICULAR; INTRALESIONAL; INTRAMUSCULAR; SOFT TISSUE at 11:20

## 2023-02-17 RX ADMIN — BUPIVACAINE HYDROCHLORIDE 1 ML: 2.5 INJECTION, SOLUTION INFILTRATION; PERINEURAL at 11:20

## 2023-02-17 NOTE — PROGRESS NOTES
Assessment:  1  Primary osteoarthritis of right knee        2  Chronic pain of right knee            Plan:    • Patient has chronic right knee pain due to severe osteoarthritis  • Weight-bear as tolerated right lower extremity  • Patient received a right knee steroid injection in the office today  • Follow-up in 3 months for repeat injection        The above stated was discussed in layman's terms and the patient expressed understanding  All questions were answered to the patient's satisfaction  Subjective:   Mauro Neil is a 68 y o  male who presents today chronic right knee pain due to severe osteoarthritis  Patient had a right knee steroid injection on 11/11/2022 and had relief for about 10 weeks  He states he is having generalized right knee pain  He states pain is worse with weightbearing and increased activities  Current not taking pain medications  He denies any numbness or tingling        Review of systems negative unless otherwise specified in HPI    Past Medical History:   Diagnosis Date   • Abnormal glucose     last assessed [id number]   • Anemia     last assessed [id number]   • Benign neoplasm of descending colon 12/01/2009   • 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    unspecified laterality   • Diverticulitis of colon 02/22/2011   • Ganglion     last assessed [id number]   • Gastroenteritis     last assessed [id number]   • Glucose intolerance     last assessed [id number]   • Hyperesthesia 11/30/2010   • Rectal hemorrhage 06/01/2010       Past Surgical History:   Procedure Laterality Date   • ADENOIDECTOMY  01/01/1950 1950   • INCISIONAL BREAST BIOPSY Left 01/01/1980    L breast did bx 1980   • NECK SURGERY  01/01/1950    neck gland removed as child 1950   • TONSILLECTOMY  01/01/1950 1950       Family History   Problem Relation Age of Onset   • Alzheimer's disease Mother    • Heart attack Father    • Alcohol abuse Neg Hx    • Substance Abuse Neg Hx        Social History     Occupational History   • Not on file   Tobacco Use   • Smoking status: Former     Types: Cigarettes     Quit date:      Years since quittin 1   • Smokeless tobacco: Never   • Tobacco comments:     quit in [id number]   Substance and Sexual Activity   • Alcohol use: Yes     Comment: social   • Drug use: No   • Sexual activity: Not on file         Current Outpatient Medications:   •  albuterol (PROVENTIL HFA,VENTOLIN HFA) 90 mcg/act inhaler, Inhale 2 puffs every 6 (six) hours as needed for wheezing or shortness of breath, Disp: 8 g, Rfl: 1  •  Alpha-Lipoic Acid 600 MG CAPS, Take by mouth, Disp: , Rfl:   •  Ascorbic Acid (VITAMIN C) 100 MG tablet, Take 1 tablet by mouth daily (Patient not taking: Reported on 10/5/2022), Disp: , Rfl:   •  aspirin 81 mg chewable tablet, Chew 1 tablet daily, Disp: , Rfl:   •  Cholecalciferol (VITAMIN D3) 400 units CAPS, Take by mouth, Disp: , Rfl:   •  fluticasone (FLONASE) 50 mcg/act nasal spray, 1 spray into each nostril daily, Disp: 9 9 mL, Rfl: 1  •  glucose blood (Randy Contour Next Test) test strip, 1 each by Other route as needed (Use once daily), Disp: 100 each, Rfl: 3  •  guaiFENesin (MUCINEX) 600 mg 12 hr tablet, Take 2 tablets (1,200 mg total) by mouth every 12 (twelve) hours, Disp: 28 tablet, Rfl: 0  •  hydrochlorothiazide (HYDRODIURIL) 25 mg tablet, Take 0 5 tablets (12 5 mg total) by mouth daily, Disp: 45 tablet, Rfl: 3  •  lisinopril (ZESTRIL) 40 mg tablet, TAKE 1 TABLET BY MOUTH  DAILY, Disp: 90 tablet, Rfl: 3  •  MICROLET LANCETS MISC, Test sugars twice a day, Disp: 100 each, Rfl: 3  •  multivitamin-minerals (CENTRUM ADULTS) tablet, Take 1 tablet by mouth daily, Disp: , Rfl:   •  ofloxacin (OCUFLOX) 0 3 % ophthalmic solution, INSTILL 1 DROP TO OPERATIVE EYE 4 TIMES A DAY AS DIRECTED, Disp: , Rfl:   •  omeprazole (PriLOSEC) 40 MG capsule, TAKE 1 CAPSULE BY MOUTH  DAILY, Disp: 90 capsule, Rfl: 3  •  prednisoLONE acetate (PRED FORTE) 1 % ophthalmic suspension, INSTILL 1 DROP TO OPERATIVE EYE 4 TIMES A DAY AS DIRECTED, Disp: , Rfl:   •  SF 5000 Plus 1 1 % CREA, BRUSH TWICE A DAY AND DO NOT DRINK AFTER BRUSHING AT NIGHT, Disp: , Rfl:   •  simvastatin (ZOCOR) 5 MG tablet, TAKE 1 TABLET BY MOUTH  DAILY AT BEDTIME, Disp: 90 tablet, Rfl: 3    Allergies   Allergen Reactions   • Iodine - Food Allergy      Other reaction(s): was told by nurse: heat rash? Vitals:    02/17/23 1052   BP: 135/69   Pulse: 73       Objective:            Physical Exam  Physical Exam:      General Appearance:    Alert, cooperative, no distress, appears stated age   Head:    Normocephalic, without obvious abnormality, atraumatic   Eyes:    conjunctiva/corneas clear, both eyes         Nose:   Nares normal, septum midline, no drainage    Throat:   Lips normal; teeth and gums normal   Neck:    symmetrical, trachea midline, ;     thyroid:  no enlargement/   Back:     Symmetric, no curvature, ROM normal   Lungs:   No audible wheezing or labored breathing   Chest Wall:    No tenderness or deformity    Heart:    Regular rate and rhythm                         Pulses:   2+ and symmetric all extremities   Skin:   Skin color, texture, turgor normal, no rashes or lesions   Neurologic:   normal strength, sensation and reflexes     throughout                       Ortho Exam  Right knee  Small abrasion anterolateral   No erythema   No effusion   Full extension   TTP medial joint line   Stable to varus and valgus stress   Neurovascularly Intact Distally     Diagnostics, reviewed and taken today if performed as documented:    None performed        Procedures, if performed today:    Large joint arthrocentesis: R knee  Universal Protocol:  Consent: Verbal consent obtained    Risks and benefits: risks, benefits and alternatives were discussed  Consent given by: patient  Time out: Immediately prior to procedure a "time out" was called to verify the correct patient, procedure, equipment, support staff and site/side marked as required  Timeout called at: 2/17/2023 11:19 AM   Patient understanding: patient states understanding of the procedure being performed  Site marked: the operative site was marked  Supporting Documentation  Indications: pain and joint swelling   Procedure Details  Location: knee - R knee  Needle size: 22 G  Approach: anterolateral  Medications administered: 1 mL bupivacaine 0 25 %; 2 mL methylPREDNISolone acetate 40 mg/mL ( 5 ml 60 ml Toradol )              Scribe Attestation    I,:  Ara Chatman am acting as a scribe while in the presence of the attending physician :       I,:  Monet Thomson MD personally performed the services described in this documentation    as scribed in my presence :             Portions of the record may have been created with voice recognition software  Occasional wrong word or "sound a like" substitutions may have occurred due to the inherent limitations of voice recognition software  Read the chart carefully and recognize, using context, where substitutions have occurred

## 2023-03-03 ENCOUNTER — OFFICE VISIT (OUTPATIENT)
Dept: FAMILY MEDICINE CLINIC | Facility: CLINIC | Age: 78
End: 2023-03-03

## 2023-03-03 VITALS
WEIGHT: 188.8 LBS | BODY MASS INDEX: 31.46 KG/M2 | HEIGHT: 65 IN | RESPIRATION RATE: 18 BRPM | OXYGEN SATURATION: 100 % | DIASTOLIC BLOOD PRESSURE: 68 MMHG | HEART RATE: 73 BPM | SYSTOLIC BLOOD PRESSURE: 130 MMHG

## 2023-03-03 DIAGNOSIS — R49.0 HOARSENESS OF VOICE: Primary | ICD-10-CM

## 2023-03-03 NOTE — PROGRESS NOTES
Assessment/Plan:     Diagnoses and all orders for this visit:    Hoarseness of voice      Normal voice today  Pt to start on his Flonase nasal spray again to hopefully limit the amount of PND he experiences  This in turn should help alleviate throat irritation  He is encouraged to drink plenty of fluids and use hard candies to help coat his throat  Patient is encouraged to call our office for any questions/concerns, persistent or worsening symptoms  Patient states they understand and agree with treatment plan  Pt to f/u PRN  Subjective:      Patient ID: Eri Austin is a 68 y o  male  Pt here today for on/off hoarseness of his voice  He also admits that sometimes his mouth is sensitive to pepper products like pepperoni or an Michel sub  He admits this started after having thrush last July  Pt was treated w/ Nystatin for his thrush and admits his symptoms are gone  He denies fever, chills, sore throat  He does admit to a good amount of PND  He takes Omeprazole for GERD  The following portions of the patient's history were reviewed and updated as appropriate: allergies, current medications, past family history, past medical history, past social history, past surgical history and problem list     Review of Systems    As noted per HPI  Objective:      /68   Pulse 73   Resp 18   Ht 5' 5 25" (1 657 m)   Wt 85 6 kg (188 lb 12 8 oz)   SpO2 100%   BMI 31 18 kg/m²          Physical Exam  Vitals reviewed  Constitutional:       General: He is not in acute distress  Appearance: Normal appearance  He is not ill-appearing  HENT:      Right Ear: Tympanic membrane, ear canal and external ear normal       Left Ear: Tympanic membrane, ear canal and external ear normal       Nose: No mucosal edema, congestion or rhinorrhea  Mouth/Throat:      Mouth: Mucous membranes are moist  No oral lesions  Tongue: No lesions  Palate: No mass and lesions        Pharynx: No pharyngeal swelling, oropharyngeal exudate or posterior oropharyngeal erythema  Cardiovascular:      Rate and Rhythm: Normal rate and regular rhythm  Pulses: Normal pulses  Heart sounds: Normal heart sounds  Pulmonary:      Effort: Pulmonary effort is normal       Breath sounds: Normal breath sounds  Lymphadenopathy:      Cervical: No cervical adenopathy  Neurological:      General: No focal deficit present  Mental Status: He is alert and oriented to person, place, and time     Psychiatric:         Mood and Affect: Mood normal          Behavior: Behavior normal

## 2023-04-19 PROBLEM — N18.30 STAGE 3 CHRONIC KIDNEY DISEASE, UNSPECIFIED WHETHER STAGE 3A OR 3B CKD (HCC): Status: ACTIVE | Noted: 2023-04-19

## 2023-04-19 PROBLEM — H52.4 PRESBYOPIA: Status: ACTIVE | Noted: 2023-04-19

## 2023-04-19 PROBLEM — H52.13 MYOPIA OF BOTH EYES: Status: ACTIVE | Noted: 2023-04-19

## 2023-04-19 PROBLEM — H43.813 VITREOUS DEGENERATION OF BOTH EYES: Status: ACTIVE | Noted: 2023-04-19

## 2023-04-20 ENCOUNTER — APPOINTMENT (OUTPATIENT)
Dept: LAB | Facility: HOSPITAL | Age: 78
End: 2023-04-20

## 2023-04-20 DIAGNOSIS — Z13.6 SCREENING FOR CARDIOVASCULAR CONDITION: ICD-10-CM

## 2023-04-20 DIAGNOSIS — Z13.220 SCREENING FOR LIPID DISORDERS: ICD-10-CM

## 2023-04-20 DIAGNOSIS — E55.9 VITAMIN D DEFICIENCY: ICD-10-CM

## 2023-04-20 DIAGNOSIS — R73.01 IMPAIRED FASTING GLUCOSE: ICD-10-CM

## 2023-04-20 DIAGNOSIS — R80.9 TYPE 2 DIABETES MELLITUS WITH MICROALBUMINURIA, WITHOUT LONG-TERM CURRENT USE OF INSULIN (HCC): ICD-10-CM

## 2023-04-20 DIAGNOSIS — E11.29 TYPE 2 DIABETES MELLITUS WITH MICROALBUMINURIA, WITHOUT LONG-TERM CURRENT USE OF INSULIN (HCC): ICD-10-CM

## 2023-04-20 DIAGNOSIS — Z13.29 SCREENING FOR THYROID DISORDER: ICD-10-CM

## 2023-04-20 DIAGNOSIS — Z12.5 PROSTATE CANCER SCREENING: ICD-10-CM

## 2023-04-20 DIAGNOSIS — Z13.228 SCREENING FOR METABOLIC DISORDER: ICD-10-CM

## 2023-04-20 LAB
25(OH)D3 SERPL-MCNC: 27.2 NG/ML (ref 30–100)
ALBUMIN SERPL BCP-MCNC: 3.9 G/DL (ref 3.5–5)
ALP SERPL-CCNC: 74 U/L (ref 46–116)
ALT SERPL W P-5'-P-CCNC: 36 U/L (ref 12–78)
ANION GAP SERPL CALCULATED.3IONS-SCNC: 4 MMOL/L (ref 4–13)
AST SERPL W P-5'-P-CCNC: 18 U/L (ref 5–45)
BASOPHILS # BLD AUTO: 0.05 THOUSANDS/ΜL (ref 0–0.1)
BASOPHILS NFR BLD AUTO: 1 % (ref 0–1)
BILIRUB SERPL-MCNC: 0.48 MG/DL (ref 0.2–1)
BUN SERPL-MCNC: 18 MG/DL (ref 5–25)
CALCIUM SERPL-MCNC: 8.9 MG/DL (ref 8.3–10.1)
CHLORIDE SERPL-SCNC: 108 MMOL/L (ref 96–108)
CHOLEST SERPL-MCNC: 150 MG/DL
CO2 SERPL-SCNC: 25 MMOL/L (ref 21–32)
CREAT SERPL-MCNC: 1.29 MG/DL (ref 0.6–1.3)
EOSINOPHIL # BLD AUTO: 0.23 THOUSAND/ΜL (ref 0–0.61)
EOSINOPHIL NFR BLD AUTO: 3 % (ref 0–6)
ERYTHROCYTE [DISTWIDTH] IN BLOOD BY AUTOMATED COUNT: 13.3 % (ref 11.6–15.1)
GFR SERPL CREATININE-BSD FRML MDRD: 53 ML/MIN/1.73SQ M
GLUCOSE P FAST SERPL-MCNC: 161 MG/DL (ref 65–99)
HCT VFR BLD AUTO: 43.5 % (ref 36.5–49.3)
HDLC SERPL-MCNC: 33 MG/DL
HGB BLD-MCNC: 14.3 G/DL (ref 12–17)
IMM GRANULOCYTES # BLD AUTO: 0.04 THOUSAND/UL (ref 0–0.2)
IMM GRANULOCYTES NFR BLD AUTO: 1 % (ref 0–2)
LDLC SERPL CALC-MCNC: 81 MG/DL (ref 0–100)
LYMPHOCYTES # BLD AUTO: 1.88 THOUSANDS/ΜL (ref 0.6–4.47)
LYMPHOCYTES NFR BLD AUTO: 21 % (ref 14–44)
MCH RBC QN AUTO: 31.2 PG (ref 26.8–34.3)
MCHC RBC AUTO-ENTMCNC: 32.9 G/DL (ref 31.4–37.4)
MCV RBC AUTO: 95 FL (ref 82–98)
MONOCYTES # BLD AUTO: 0.95 THOUSAND/ΜL (ref 0.17–1.22)
MONOCYTES NFR BLD AUTO: 11 % (ref 4–12)
NEUTROPHILS # BLD AUTO: 5.64 THOUSANDS/ΜL (ref 1.85–7.62)
NEUTS SEG NFR BLD AUTO: 63 % (ref 43–75)
NONHDLC SERPL-MCNC: 117 MG/DL
NRBC BLD AUTO-RTO: 0 /100 WBCS
PLATELET # BLD AUTO: 292 THOUSANDS/UL (ref 149–390)
PMV BLD AUTO: 11.1 FL (ref 8.9–12.7)
POTASSIUM SERPL-SCNC: 3.9 MMOL/L (ref 3.5–5.3)
PROT SERPL-MCNC: 7.5 G/DL (ref 6.4–8.4)
PSA SERPL-MCNC: 1.3 NG/ML (ref 0–4)
RBC # BLD AUTO: 4.58 MILLION/UL (ref 3.88–5.62)
SODIUM SERPL-SCNC: 137 MMOL/L (ref 135–147)
TRIGL SERPL-MCNC: 179 MG/DL
TSH SERPL DL<=0.05 MIU/L-ACNC: 1.38 UIU/ML (ref 0.45–4.5)
WBC # BLD AUTO: 8.79 THOUSAND/UL (ref 4.31–10.16)

## 2023-04-21 LAB
EST. AVERAGE GLUCOSE BLD GHB EST-MCNC: 134 MG/DL
HBA1C MFR BLD: 6.3 %

## 2023-05-19 ENCOUNTER — OFFICE VISIT (OUTPATIENT)
Dept: OBGYN CLINIC | Facility: CLINIC | Age: 78
End: 2023-05-19

## 2023-05-19 VITALS
SYSTOLIC BLOOD PRESSURE: 144 MMHG | HEART RATE: 75 BPM | BODY MASS INDEX: 32.15 KG/M2 | WEIGHT: 193 LBS | HEIGHT: 65 IN | DIASTOLIC BLOOD PRESSURE: 70 MMHG

## 2023-05-19 DIAGNOSIS — G89.29 CHRONIC PAIN OF RIGHT KNEE: ICD-10-CM

## 2023-05-19 DIAGNOSIS — M17.11 PRIMARY OSTEOARTHRITIS OF RIGHT KNEE: Primary | ICD-10-CM

## 2023-05-19 DIAGNOSIS — M25.561 CHRONIC PAIN OF RIGHT KNEE: ICD-10-CM

## 2023-05-19 RX ORDER — BUPIVACAINE HYDROCHLORIDE 2.5 MG/ML
2 INJECTION, SOLUTION INFILTRATION; PERINEURAL
Status: COMPLETED | OUTPATIENT
Start: 2023-05-19 | End: 2023-05-19

## 2023-05-19 RX ORDER — METHYLPREDNISOLONE ACETATE 40 MG/ML
2 INJECTION, SUSPENSION INTRA-ARTICULAR; INTRALESIONAL; INTRAMUSCULAR; SOFT TISSUE
Status: COMPLETED | OUTPATIENT
Start: 2023-05-19 | End: 2023-05-19

## 2023-05-19 RX ADMIN — BUPIVACAINE HYDROCHLORIDE 2 ML: 2.5 INJECTION, SOLUTION INFILTRATION; PERINEURAL at 10:58

## 2023-05-19 RX ADMIN — METHYLPREDNISOLONE ACETATE 2 ML: 40 INJECTION, SUSPENSION INTRA-ARTICULAR; INTRALESIONAL; INTRAMUSCULAR; SOFT TISSUE at 10:58

## 2023-06-16 DIAGNOSIS — E78.2 MIXED HYPERLIPIDEMIA: ICD-10-CM

## 2023-06-16 RX ORDER — SIMVASTATIN 5 MG
TABLET ORAL
Qty: 90 TABLET | Refills: 3 | Status: SHIPPED | OUTPATIENT
Start: 2023-06-16

## 2023-06-20 LAB
LEFT EYE DIABETIC RETINOPATHY: NORMAL
RIGHT EYE DIABETIC RETINOPATHY: NORMAL

## 2023-08-25 ENCOUNTER — OFFICE VISIT (OUTPATIENT)
Dept: OBGYN CLINIC | Facility: CLINIC | Age: 78
End: 2023-08-25
Payer: MEDICARE

## 2023-08-25 VITALS
WEIGHT: 190 LBS | HEIGHT: 66 IN | DIASTOLIC BLOOD PRESSURE: 77 MMHG | SYSTOLIC BLOOD PRESSURE: 135 MMHG | BODY MASS INDEX: 30.53 KG/M2 | HEART RATE: 106 BPM

## 2023-08-25 DIAGNOSIS — M17.11 PRIMARY OSTEOARTHRITIS OF RIGHT KNEE: Primary | ICD-10-CM

## 2023-08-25 PROCEDURE — 20610 DRAIN/INJ JOINT/BURSA W/O US: CPT | Performed by: ORTHOPAEDIC SURGERY

## 2023-08-25 PROCEDURE — 99213 OFFICE O/P EST LOW 20 MIN: CPT | Performed by: ORTHOPAEDIC SURGERY

## 2023-08-25 RX ORDER — METHYLPREDNISOLONE ACETATE 40 MG/ML
2 INJECTION, SUSPENSION INTRA-ARTICULAR; INTRALESIONAL; INTRAMUSCULAR; SOFT TISSUE
Status: COMPLETED | OUTPATIENT
Start: 2023-08-25 | End: 2023-08-25

## 2023-08-25 RX ORDER — BUPIVACAINE HYDROCHLORIDE 2.5 MG/ML
1 INJECTION, SOLUTION INFILTRATION; PERINEURAL
Status: COMPLETED | OUTPATIENT
Start: 2023-08-25 | End: 2023-08-25

## 2023-08-25 RX ORDER — KETOROLAC TROMETHAMINE 30 MG/ML
30 INJECTION, SOLUTION INTRAMUSCULAR; INTRAVENOUS
Status: COMPLETED | OUTPATIENT
Start: 2023-08-25 | End: 2023-08-25

## 2023-08-25 RX ADMIN — METHYLPREDNISOLONE ACETATE 2 ML: 40 INJECTION, SUSPENSION INTRA-ARTICULAR; INTRALESIONAL; INTRAMUSCULAR; SOFT TISSUE at 09:45

## 2023-08-25 RX ADMIN — BUPIVACAINE HYDROCHLORIDE 1 ML: 2.5 INJECTION, SOLUTION INFILTRATION; PERINEURAL at 09:45

## 2023-08-25 RX ADMIN — KETOROLAC TROMETHAMINE 30 MG: 30 INJECTION, SOLUTION INTRAMUSCULAR; INTRAVENOUS at 09:45

## 2023-08-25 NOTE — PROGRESS NOTES
Assessment:   Diagnosis ICD-10-CM Associated Orders   1. Primary osteoarthritis of right knee  M17.11       2. Chronic right knee pain    Plan:  • Patient continues to get significant relief with periodic cortisone injections. An injection for the right knee was administered today, which she tolerated well. • At this time patient is to continue with his activities as tolerated. He can take over-the-counter Tylenol as needed for pain discomfort. To do next visit:  Return in about 3 months (around 11/25/2023) for bilateral knees. The above stated was discussed in layman's terms and the patient expressed understanding. All questions were answered to the patient's satisfaction. Scribe Attestation    I,:  Caitie Mattson am acting as a scribe while in the presence of the attending physician.:       I,:  Mya Carrizales MD personally performed the services described in this documentation    as scribed in my presence.:             Subjective:   Moreno Cool is a 68 y.o. male who presents today for his right knee pain due to osteoarthritis. He gets periodic cortisone injections, last one lasted him until about 1 week ago. He has been moving his pellets for his pellet stove. He reports some soreness in the anterior aspect of the knee. Review of systems negative unless otherwise specified in HPI  Review of Systems   Constitutional: Negative for chills, fever and unexpected weight change. HENT: Negative for hearing loss, nosebleeds and sore throat. Eyes: Negative for pain, redness and visual disturbance. Respiratory: Negative for cough, shortness of breath and wheezing. Cardiovascular: Negative for chest pain, palpitations and leg swelling. Gastrointestinal: Negative for abdominal pain, nausea and vomiting. Endocrine: Negative for polydipsia and polyuria. Genitourinary: Negative for dysuria and hematuria. Musculoskeletal: Positive for arthralgias.    Skin: Negative for rash and wound.   Neurological: Negative for dizziness, light-headedness and headaches. Psychiatric/Behavioral: Negative for decreased concentration, dysphoric mood and suicidal ideas. The patient is not nervous/anxious.         Past Medical History:   Diagnosis Date   • Abnormal glucose     last assessed 2012   • Anemia     last assessed [id number]   • Benign neoplasm of descending colon 2009   • Chondromalacia of patella 2011    unspecified laterality   • Diverticulitis of colon 2011   • Ganglion     last assessed 2013   • Gastroenteritis     last assessed [id number]   • Glucose intolerance     last assessed 2014   • Hyperesthesia 2010   • Rectal hemorrhage 2010       Past Surgical History:   Procedure Laterality Date   • ADENOIDECTOMY  1950   • INCISIONAL BREAST BIOPSY Left 1980    L breast did bx    • NECK SURGERY  1950    neck gland removed as child    • TONSILLECTOMY  1950       Family History   Problem Relation Age of Onset   • Alzheimer's disease Mother    • Heart attack Father    • Alcohol abuse Neg Hx    • Substance Abuse Neg Hx        Social History     Occupational History   • Not on file   Tobacco Use   • Smoking status: Former     Types: Cigarettes     Quit date:      Years since quittin.6   • Smokeless tobacco: Never   • Tobacco comments:     quit in [id number]   Vaping Use   • Vaping Use: Never used   Substance and Sexual Activity   • Alcohol use: Yes     Comment: social   • Drug use: No   • Sexual activity: Not on file         Current Outpatient Medications:   •  Alpha-Lipoic Acid 600 MG CAPS, Take by mouth, Disp: , Rfl:   •  aspirin 81 mg chewable tablet, Chew 1 tablet daily, Disp: , Rfl:   •  Cholecalciferol (VITAMIN D3) 400 units CAPS, Take by mouth, Disp: , Rfl:   •  fluticasone (FLONASE) 50 mcg/act nasal spray, 1 spray into each nostril daily, Disp: 9.9 mL, Rfl: 1  •  glucose blood (Randy Contour Next Test) test strip, 1 each by Other route as needed (Use once daily), Disp: 100 each, Rfl: 3  •  hydrochlorothiazide (HYDRODIURIL) 25 mg tablet, Take 0.5 tablets (12.5 mg total) by mouth daily, Disp: 45 tablet, Rfl: 3  •  lisinopril (ZESTRIL) 40 mg tablet, TAKE 1 TABLET BY MOUTH  DAILY, Disp: 90 tablet, Rfl: 1  •  MICROLET LANCETS MISC, Test sugars twice a day, Disp: 100 each, Rfl: 3  •  multivitamin-minerals (CENTRUM ADULTS) tablet, Take 1 tablet by mouth daily, Disp: , Rfl:   •  ofloxacin (OCUFLOX) 0.3 % ophthalmic solution, INSTILL 1 DROP TO OPERATIVE EYE 4 TIMES A DAY AS DIRECTED, Disp: , Rfl:   •  omeprazole (PriLOSEC) 40 MG capsule, TAKE 1 CAPSULE BY MOUTH  DAILY, Disp: 90 capsule, Rfl: 3  •  prednisoLONE acetate (PRED FORTE) 1 % ophthalmic suspension, INSTILL 1 DROP TO OPERATIVE EYE 4 TIMES A DAY AS DIRECTED, Disp: , Rfl:   •  SF 5000 Plus 1.1 % CREA, BRUSH TWICE A DAY AND DO NOT DRINK AFTER BRUSHING AT NIGHT, Disp: , Rfl:   •  simvastatin (ZOCOR) 5 MG tablet, TAKE 1 TABLET BY MOUTH  DAILY AT BEDTIME, Disp: 90 tablet, Rfl: 3  •  albuterol (PROVENTIL HFA,VENTOLIN HFA) 90 mcg/act inhaler, Inhale 2 puffs every 6 (six) hours as needed for wheezing or shortness of breath, Disp: 8 g, Rfl: 1    Allergies   Allergen Reactions   • Iodine - Food Allergy      Other reaction(s): was told by nurse: heat rash? Vitals:    08/25/23 0848   BP: 135/77   Pulse: (!) 106       Objective:                    Right Knee Exam     Tenderness   The patient is experiencing tenderness in the medial joint line and lateral joint line.     Range of Motion   Extension: 0   Flexion: 120     Tests   Varus: negative Valgus: negative    Other   Erythema: absent  Sensation: normal  Pulse: present  Swelling: none  Effusion: no effusion present    Comments:  Varus alignment  Calf is soft and nontender            Diagnostics, reviewed and taken today if performed as documented:    None performed      The attending physician has personally reviewed the pertinent films in PACS and interpretation is as follows:      Procedures, if performed today:    Large joint arthrocentesis: R knee  Universal Protocol:  Consent: Verbal consent obtained. Risks and benefits: risks, benefits and alternatives were discussed  Consent given by: patient  Time out: Immediately prior to procedure a "time out" was called to verify the correct patient, procedure, equipment, support staff and site/side marked as required. Timeout called at: 8/25/2023 9:20 AM.  Patient understanding: patient states understanding of the procedure being performed  Site marked: the operative site was marked  Patient identity confirmed: verbally with patient    Supporting Documentation  Indications: pain   Procedure Details  Location: knee - R knee  Preparation: Patient was prepped and draped in the usual sterile fashion  Needle size: 22 G  Ultrasound guidance: no  Approach: anterolateral  Medications administered: 2 mL methylPREDNISolone acetate 40 mg/mL; 1 mL bupivacaine 0.25 %; 30 mg ketorolac 30 mg/mL    Patient tolerance: patient tolerated the procedure well with no immediate complications  Dressing:  Sterile dressing applied          Portions of the record may have been created with voice recognition software. Occasional wrong word or "sound a like" substitutions may have occurred due to the inherent limitations of voice recognition software. Read the chart carefully and recognize, using context, where substitutions have occurred.

## 2023-09-21 ENCOUNTER — IMMUNIZATIONS (OUTPATIENT)
Dept: FAMILY MEDICINE CLINIC | Facility: CLINIC | Age: 78
End: 2023-09-21
Payer: MEDICARE

## 2023-09-21 DIAGNOSIS — Z23 NEED FOR VACCINATION: Primary | ICD-10-CM

## 2023-09-21 PROCEDURE — G0008 ADMIN INFLUENZA VIRUS VAC: HCPCS

## 2023-09-21 PROCEDURE — 90662 IIV NO PRSV INCREASED AG IM: CPT

## 2023-10-13 ENCOUNTER — RA CDI HCC (OUTPATIENT)
Dept: OTHER | Facility: HOSPITAL | Age: 78
End: 2023-10-13

## 2023-10-19 ENCOUNTER — OFFICE VISIT (OUTPATIENT)
Dept: FAMILY MEDICINE CLINIC | Facility: CLINIC | Age: 78
End: 2023-10-19
Payer: MEDICARE

## 2023-10-19 VITALS
SYSTOLIC BLOOD PRESSURE: 124 MMHG | DIASTOLIC BLOOD PRESSURE: 74 MMHG | HEIGHT: 66 IN | BODY MASS INDEX: 31.31 KG/M2 | RESPIRATION RATE: 15 BRPM | HEART RATE: 100 BPM | OXYGEN SATURATION: 98 % | WEIGHT: 194.8 LBS

## 2023-10-19 DIAGNOSIS — R80.9 TYPE 2 DIABETES MELLITUS WITH MICROALBUMINURIA, WITHOUT LONG-TERM CURRENT USE OF INSULIN: ICD-10-CM

## 2023-10-19 DIAGNOSIS — E55.9 VITAMIN D DEFICIENCY: ICD-10-CM

## 2023-10-19 DIAGNOSIS — Z12.11 SCREENING FOR MALIGNANT NEOPLASM OF COLON: ICD-10-CM

## 2023-10-19 DIAGNOSIS — I77.811 ECTATIC ABDOMINAL AORTA (HCC): ICD-10-CM

## 2023-10-19 DIAGNOSIS — E11.29 TYPE 2 DIABETES MELLITUS WITH MICROALBUMINURIA, WITHOUT LONG-TERM CURRENT USE OF INSULIN: ICD-10-CM

## 2023-10-19 DIAGNOSIS — I10 ESSENTIAL HYPERTENSION: ICD-10-CM

## 2023-10-19 DIAGNOSIS — N18.30 STAGE 3 CHRONIC KIDNEY DISEASE, UNSPECIFIED WHETHER STAGE 3A OR 3B CKD (HCC): ICD-10-CM

## 2023-10-19 DIAGNOSIS — E78.2 MIXED HYPERLIPIDEMIA: ICD-10-CM

## 2023-10-19 DIAGNOSIS — K21.9 GASTROESOPHAGEAL REFLUX DISEASE WITHOUT ESOPHAGITIS: ICD-10-CM

## 2023-10-19 DIAGNOSIS — R06.09 DOE (DYSPNEA ON EXERTION): Primary | ICD-10-CM

## 2023-10-19 PROCEDURE — 99215 OFFICE O/P EST HI 40 MIN: CPT | Performed by: FAMILY MEDICINE

## 2023-10-19 RX ORDER — HYDROCHLOROTHIAZIDE 25 MG/1
12.5 TABLET ORAL DAILY
Qty: 45 TABLET | Refills: 3 | Status: SHIPPED | OUTPATIENT
Start: 2023-10-19

## 2023-10-19 RX ORDER — LISINOPRIL 40 MG/1
40 TABLET ORAL DAILY
Qty: 90 TABLET | Refills: 1 | Status: SHIPPED | OUTPATIENT
Start: 2023-10-19

## 2023-10-19 NOTE — PATIENT INSTRUCTIONS
Please call GI and schedule your colonoscopy    Most importantly please call and schedule your stress test and we will call you with the results and a plan    Really consider exercising to help bring down your A1c and try cutting out some bread    See you back in 6 months with fasting blood work and urine one or 2 weeks prior  See you sooner if needed

## 2023-10-19 NOTE — PROGRESS NOTES
ASSESSMENT/PLAN: Full PE done today    Dyspnea on exertion  We will check a stress echo for any heart disease to see if patient's limitations are due to this or just due to him being out of shape  In the meantime his current start trying to take some time to walk for himself and leave his wife alone for a small bit while he does it    Type 2 diabetes  A1c 6.7 from 6.1  Again some exercise WILL help patient A1c drop  Currently diet controlled only  If it goes up we will have to start adding medication    Diabetic nephropathy  Last GFR back to normal greater than 60  Continue lisinopril  Check microalbumin before next visit and GFR     Osteoarthritis of the knee  Continue injections through Ortho     Esophageal reflux  Only omeprazole works  Continue the same     Hyperlipidemia   Continue simvastatin  Cholesterol values are good at 158 with LDL of 97     Hypertension   Blood pressure excellent 124/74  Continue lisinopril and HCTZ  EKG next visit       Recheck in 6 mo   A1c microalbumin screening labs with PSA  Recheck sooner if needed          Depression Screening and Follow-up Plan: Patient was screened for depression during today's encounter. They screened negative with a PHQ-2 score of 0.            Health Maintenance   Topic Date Due    SLP PLAN OF CARE  Never done    COVID-19 Vaccine (5 - Moderna series) 12/16/2021    Colorectal Cancer Screening  07/24/2023    HEMOGLOBIN A1C  10/20/2023    BMI: Followup Plan  04/19/2024    Hepatitis C Screening  11/22/2023 (Originally 1945)    Fall Risk  04/19/2024    Medicare Annual Wellness Visit (AWV)  04/19/2024    Diabetic Foot Exam  04/19/2024    Kidney Health Evaluation: GFR  04/20/2024    Kidney Health Evaluation: Albumin/Creatinine Ratio  04/20/2024    BMI: Adult  08/25/2024    Depression Screening  10/19/2024    DM Eye Exam  06/20/2025    Pneumococcal Vaccine: 65+ Years  Completed    Influenza Vaccine  Completed    HIB Vaccine  Aged Out    IPV Vaccine  Aged Out Hepatitis A Vaccine  Aged Out    Meningococcal ACWY Vaccine  Aged Out    HPV Vaccine  Aged Out         Problem List as of 10/19/2023 Reviewed: 8/25/2023 12:50 PM by Silver Last MD      Actinic keratosis    Adjustment insomnia    Allergic rhinitis    De Quervain's tenosynovitis, right    Diabetic polyneuropathy associated with type 2 diabetes mellitus ([address])    Diverticulosis of colon    Ectatic abdominal aorta (HCC)    Elastosis of skin    Essential hypertension    Gastroesophageal reflux disease without esophagitis    Hearing loss    Hiatal hernia    Mixed hyperlipidemia    Myopia of both eyes    Osteoarthritis of right knee    Presbyopia    Primary open angle glaucoma (POAG) of both eyes, mild stage    Stage 3 chronic kidney disease, unspecified whether stage 3a or 3b CKD (HCC)    Tinnitus    Trigger middle finger of right hand    Type 2 diabetes mellitus with microalbuminuria, without long-term current use of insulin     Vitamin D deficiency    Vitreous degeneration of both eyes         Subjective:   Chief Complaint   Patient presents with    Diabetes    Hypertension    Vitamin D Deficiency            Patient is here for 6-month recheck    He has been to Ortho regarding his knees a few times and has had a couple injections that seem to be lasting shorter and shorter times    He also had blood work done through the [place] which we reviewed with him as well as Ortho notes    He is not really getting outside to exercise for the last 2 years because of his wife's medical issues  As a result he finds that his endurance is much shorter and he feels tired more often  He does get short of breath and just fatigued with doing any kind of work        patient ID: Wes Henriquez is a 66 y.o. male. Patient's past medical history, surgical history, family history, social history, and Tobacco history reviewed with patient.      MED LIST WAS REVIEWED AND UPDATED    ROS  As per HPI  Rest of 12 point review of systems negative Objective:      VITALS:  Wt Readings from Last 3 Encounters:   10/19/23 88.4 kg (194 lb 12.8 oz)   08/25/23 86.2 kg (190 lb)   05/19/23 87.5 kg (193 lb)     BP Readings from Last 3 Encounters:   10/19/23 124/74   08/25/23 135/77   05/19/23 144/70     Pulse Readings from Last 3 Encounters:   10/19/23 100   08/25/23 (!) 106   05/19/23 75     Body mass index is 31.44 kg/m². Laboratory Results: All pertinent labs and studies were reviewed with patient during this office visit with highlights of the results contained in this note in the ASSESSMENT AND PLAN section       Physical Exam      Gen. No acute distress well-appearing well-nourished appears stated age    Mental status  Good judgment and insight oriented to time person and place, recent and remote memory intact mood and affect normal cooperative and patient is reasonable    HEENT  PERRLA 3 mm, EOMI without nystagmus, normocephalic atraumatic without facial weakness    Neck   supple no masses trachea midline positive click normal carotid upstrokes with no bruits    Cor  Regular rhythm without ectopy or murmur, no S3-S4, normal palpation that is no heave lift or thrill    Vascular  No edema, good pedal pulses    Lungs  CTA bilaterally in no respiratory distress no wheezes rhonchi or rales, normal to palpation no tactile fremitus    Abdomen  Soft, no palpable masses, no hepatosplenomegaly, normal bowel sounds, nontender    Lymphatics  No palpable nodes in the neck, supraclavicular area, axilla, or groin    Musculoskeletal  No clubbing cyanosis or edema muscle tone normal    Skin  no rashes or abnormal appearing lesions    Neuro  Normal ambulation, cranial nerves 2-12 grossly intact, higher functioning with reasoning intact.

## 2023-10-26 ENCOUNTER — TELEPHONE (OUTPATIENT)
Age: 78
End: 2023-10-26

## 2023-10-26 NOTE — TELEPHONE ENCOUNTER
COLONOSCOPY  MIRALAX/Dulcolax Bowel Preparation Instructions    The OR/GI Lab will contact you the evening prior to your procedure with your exact arrival time. Our practice requires a 1 week notice for any cancellations or rescheduling. We kindly ask that you immediately notify us of any changes including any new medications that are prescribed. Thank you for your cooperation. WEEK BEFORE YOUR PROCEDURE:  Stop taking Iron tablets. 5 days prior, AVOID vegetables and fruits with skins or seeds, nuts, corn, popcorn and whole grain breads. Purchase: One (1) 238-gram container of Miralax (polyethylene glycol 3350), four (4) 5 mg Dulcolax (bisacodyl) tablets, and one (1) 64-ounce bottle of Gatorade (sports drink) - no red, orange, or purple. These may be purchased at any pharmacy without a prescription. Generic products are permissible. Arrange responsible transportation for day of the procedure. DAY BEFORE THE PROCEDURE:   CLEAR liquids only for entire day prior. Nothing red, orange or purple. You MAY have:                                                               Soda  Water  Broth Gatorade  Jello  Popsicles Coffee/tea without milk/creamer     YOU MAY NOT HAVE:  Solid foods   Milk and milk products    Juice with pulp    BOWEL PREPARATION:  Includes: One (1) 238-gram container of Miralax (polyethylene glycol 3350), four (4) 5 mg Dulcolax (bisacodyl) tablets, and one (1) 64-ounce bottle of Gatorade (sports drink). Preparation may be refrigerated. Entire bowel prep should be completed. Afternoon before the procedure (2:00 pm - 5:00 pm): Take two (2) 5 mg Dulcolax laxative tablets. Evening before the procedure (6:00 pm):  Mix entire container of Miralax with one (1) 64-ounce bottle of Gatorade and shake until all medication is dissolved. Begin drinking solution. Drink an eight (8) ounce cup every 10-15 minutes until you have consumed half (32 ounces) of the solution.   Refrigerate remaining solution. Night before the procedure (8:00 pm): Take two (2) 5 mg Dulcolax laxative tablets. Beginning 5 hours before your procedure:  Drink the remaining amount of prepared solution (32 ounces). Drink an eight (8) ounce cup every 10-15 minutes until you have consumed the remaining solution. Bowel prep should be completed 4 hours prior to procedure time. NOTHING TO EAT OR DRINK AFTER MIDNIGHT- EXCEPT FOR YOUR PREP    DAY OF THE PROCEDURE:  You may brush your teeth. Leave all jewelry at home. Please arrive for your procedure as indicated by the OR / GI Lab / Endoscopy Unit. The hospital will contact you the day before with your exact arrival time. Make sure you have arranged ahead of time for a responsible adult (18 or older) to accompany and drive you home after the procedure. Please discuss any transportation concerns with our staff prior to your procedure. The effects of the anesthesia can persist for 24 hours. After receiving the sedation, you must exercise caution before engaging in any activity that could harm yourself and others (such as driving a car). Do not make any important decisions or do not drink any alcoholic beverages during this time period. After your procedure, you may have anything you'd like to eat or drink. You will probably want to start with something light. Please include plenty of fluids. Avoid items that cause gas such as sodas and salads. SPECIAL INSTRUCTIONS:    For patients currently taking blood thinners and/or antiplatelet therapy our office will contact the prescribing provider. Our office will contact you with any required changes to your medication regimen. Blood thinner (i.e. - Coumadin, Pradaxa, Lovenox, Xarelto, Eliquis)  ? Continue (Do Not Stop)  ? Stop______________for_____________days prior to the procedure. Antiplatelet (i.e. - Plavix, Aggrenox, Effient, Brilinta)  ?   Continue (Do Not Stop)  ? Stop______________for_____________days prior to the procedure. Diabetes: If you are Diabetic, please see separate Diabetic Instruction Sheet. Prescribed medications:  Do not stop your aspirin, or any of your other medications (unless instructed otherwise). Take the rest of your prescribed medications with small sips of water at least 2 hours prior to your procedure. For any questions or concerns related to your bowel preparation or pre-procedure instructions, please contact our office at [phone]. Thank you for choosing [facility]!

## 2023-10-26 NOTE — TELEPHONE ENCOUNTER
Scheduled date of colonoscopy (as of today):  02.27.24    Physician performing colonoscopy:   Dr. Fernanda Sprague  Location of colonoscopy:   [facility]  Bowel prep reviewed with patient:  Miralax/ Dulcolax

## 2023-11-06 ENCOUNTER — HOSPITAL ENCOUNTER (OUTPATIENT)
Dept: NON INVASIVE DIAGNOSTICS | Age: 78
Discharge: HOME | End: 2023-11-06
Payer: MEDICARE

## 2023-11-06 VITALS
SYSTOLIC BLOOD PRESSURE: 130 MMHG | WEIGHT: 194 LBS | OXYGEN SATURATION: 96 % | HEART RATE: 84 BPM | BODY MASS INDEX: 31.18 KG/M2 | DIASTOLIC BLOOD PRESSURE: 78 MMHG | HEIGHT: 66 IN

## 2023-11-06 DIAGNOSIS — R06.09 DOE (DYSPNEA ON EXERTION): ICD-10-CM

## 2023-11-06 LAB
AV REGURGITATION PRESSURE HALF TIME: 340 MS
CHEST PAIN STATEMENT: NORMAL
CHEST PAIN STATEMENT: NORMAL
E WAVE DECELERATION TIME: 165 MS
E/A RATIO: 0.82
MAX DIASTOLIC BP: 80 MMHG
MAX DIASTOLIC BP: 80 MMHG
MAX HR PERCENT: 96 %
MAX HR: 137 BPM
MAX PREDICTED HEART RATE: 142 BPM
MAX PREDICTED HEART RATE: 142 BPM
MV PEAK A VEL: 0.57 M/S
MV PEAK E VEL: 47 CM/S
MV STENOSIS PRESSURE HALF TIME: 48 MS
MV VALVE AREA P 1/2 METHOD: 4.58
PROTOCOL NAME: NORMAL
PROTOCOL NAME: NORMAL
RATE PRESSURE PRODUCT: NORMAL
REASON FOR TERMINATION: NORMAL
REASON FOR TERMINATION: NORMAL
SL CV AV DECELERATION TIME RETROGRADE: 1172 MS
SL CV AV PEAK GRADIENT RETROGRADE: 38 MMHG
SL CV LV EF: 60
SL CV STRESS RECOVERY BP: NORMAL MMHG
SL CV STRESS RECOVERY HR: 99 BPM
SL CV STRESS RECOVERY O2 SAT: 99 %
SL CV STRESS STAGE REACHED: 2
STRESS ANGINA INDEX: 0
STRESS BASELINE BP: NORMAL MMHG
STRESS BASELINE HR: 84 BPM
STRESS O2 SAT REST: 96 %
STRESS PEAK HR: 121 BPM
STRESS POST ESTIMATED WORKLOAD: 7 METS
STRESS POST EXERCISE DUR MIN: 4 MIN
STRESS POST EXERCISE DUR SEC: 0 SEC
STRESS POST O2 SAT PEAK: 98 %
STRESS POST PEAK BP: 140 MMHG
STRESS POST PEAK HR: 137 BPM
STRESS POST PEAK HR: 137 BPM
STRESS POST PEAK SYSTOLIC BP: 144 MMHG
STRESS POST PEAK SYSTOLIC BP: 144 MMHG
TARGET HR FORMULA: NORMAL
TARGET HR FORMULA: NORMAL
TEST INDICATION: NORMAL
TEST INDICATION: NORMAL

## 2023-11-06 PROCEDURE — 93350 STRESS TTE ONLY: CPT | Performed by: INTERNAL MEDICINE

## 2023-11-06 PROCEDURE — 93350 STRESS TTE ONLY: CPT

## 2023-11-07 DIAGNOSIS — R06.09 DYSPNEA ON EXERTION: Primary | ICD-10-CM

## 2023-11-07 NOTE — RESULT ENCOUNTER NOTE
Please call patient regarding his stress test, it is normal  We are looking for a reason for why short of breath when he does things, I would like him next to get a chest x-ray and a pulmonary function test.  Orders will be sent to the printer

## 2023-11-09 ENCOUNTER — TELEPHONE (OUTPATIENT)
Dept: FAMILY MEDICINE CLINIC | Facility: CLINIC | Age: 78
End: 2023-11-09

## 2023-11-09 NOTE — TELEPHONE ENCOUNTER
----- Message from Dhaval Phillips DO sent at 11/7/2023  1:10 PM EST -----  Please call patient regarding his stress test, it is normal  We are looking for a reason for why short of breath when he does things, I would like him next to get a chest x-ray and a pulmonary function test.  Orders will be sent to the printer      Left message to call back.

## 2023-11-10 NOTE — TELEPHONE ENCOUNTER
Patient still saying that he does not have SOB, the leg is the problem. He will exercise if does not Improve he will call us.

## 2023-11-10 NOTE — TELEPHONE ENCOUNTER
Patient called back, he stated he is not having SOB, he is having leg pain, he is confused of why he needs an X-ray. Please advise.

## 2023-11-16 LAB
MAX HR PERCENT: 100 %
MAX HR PERCENT: 100 %
MAX HR: 134 BPM
MAX HR: 134 BPM
STRESS BASELINE BP: NORMAL MMHG
STRESS BASELINE BP: NORMAL MMHG
STRESS BASELINE HR: 88 BPM
STRESS BASELINE HR: 88 BPM
STRESS POST ESTIMATED WORKLOAD: 7 METS
STRESS POST ESTIMATED WORKLOAD: 7 METS
STRESS POST PEAK HR: 137 BPM
STRESS POST PEAK HR: 137 BPM
STRESS POST PEAK SYSTOLIC BP: 144 MMHG
STRESS POST PEAK SYSTOLIC BP: 144 MMHG

## 2023-12-01 ENCOUNTER — OFFICE VISIT (OUTPATIENT)
Dept: OBGYN CLINIC | Facility: CLINIC | Age: 78
End: 2023-12-01
Payer: MEDICARE

## 2023-12-01 VITALS
DIASTOLIC BLOOD PRESSURE: 67 MMHG | HEIGHT: 66 IN | BODY MASS INDEX: 30.7 KG/M2 | SYSTOLIC BLOOD PRESSURE: 111 MMHG | HEART RATE: 81 BPM | WEIGHT: 191 LBS

## 2023-12-01 DIAGNOSIS — M17.11 PRIMARY OSTEOARTHRITIS OF RIGHT KNEE: Primary | ICD-10-CM

## 2023-12-01 DIAGNOSIS — G89.29 CHRONIC PAIN OF RIGHT KNEE: ICD-10-CM

## 2023-12-01 DIAGNOSIS — M25.561 CHRONIC PAIN OF RIGHT KNEE: ICD-10-CM

## 2023-12-01 PROCEDURE — 99213 OFFICE O/P EST LOW 20 MIN: CPT | Performed by: ORTHOPAEDIC SURGERY

## 2023-12-01 PROCEDURE — 20610 DRAIN/INJ JOINT/BURSA W/O US: CPT | Performed by: ORTHOPAEDIC SURGERY

## 2023-12-01 RX ORDER — KETOROLAC TROMETHAMINE 30 MG/ML
30 INJECTION, SOLUTION INTRAMUSCULAR; INTRAVENOUS
Status: COMPLETED | OUTPATIENT
Start: 2023-12-01 | End: 2023-12-01

## 2023-12-01 RX ORDER — BUPIVACAINE HYDROCHLORIDE 2.5 MG/ML
1 INJECTION, SOLUTION INFILTRATION; PERINEURAL
Status: COMPLETED | OUTPATIENT
Start: 2023-12-01 | End: 2023-12-01

## 2023-12-01 RX ORDER — TRIAMCINOLONE ACETONIDE 40 MG/ML
40 INJECTION, SUSPENSION INTRA-ARTICULAR; INTRAMUSCULAR
Status: COMPLETED | OUTPATIENT
Start: 2023-12-01 | End: 2023-12-01

## 2023-12-01 RX ADMIN — TRIAMCINOLONE ACETONIDE 40 MG: 40 INJECTION, SUSPENSION INTRA-ARTICULAR; INTRAMUSCULAR at 10:30

## 2023-12-01 RX ADMIN — BUPIVACAINE HYDROCHLORIDE 1 ML: 2.5 INJECTION, SOLUTION INFILTRATION; PERINEURAL at 10:30

## 2023-12-01 RX ADMIN — KETOROLAC TROMETHAMINE 30 MG: 30 INJECTION, SOLUTION INTRAMUSCULAR; INTRAVENOUS at 10:30

## 2023-12-01 NOTE — PROGRESS NOTES
Assessment:   Diagnosis ICD-10-CM Associated Orders   1. Primary osteoarthritis of right knee  M17.11       2. Chronic pain of right knee  M25.561     G89.29           Plan:  On exam patient has most of his pain on the medial aspect of the knee. Today cortisone injection was changed to Kenalog to see if this gives him longer relief. Patient tolerated the injection well. Patient can use ice and over-the-counter medications for pain discomfort. To do next visit:  Return in about 3 months (around 3/1/2024) for right knee. The above stated was discussed in layman's terms and the patient expressed understanding. All questions were answered to the patient's satisfaction. Scribe Attestation      I,:  Denise Cruz am acting as a scribe while in the presence of the attending physician.:       I,:  Dale Goldstein MD personally performed the services described in this documentation    as scribed in my presence.:               Subjective:   Carmen Nieves is a 66 y.o. male who presents today for a 3-month follow-up for his right knee pain due to osteoarthritis. Patient continues to get significant relief with periodic cortisone injections. His last injection was provided on 8/25/2023. He reports that does give him 8 weeks of relief. Patient only takes over-the-counter Tylenol as needed for pain and discomfort. Weight bearing bother the medial aspect to the knee. Steps are gettign tougher to do. Review of systems negative unless otherwise specified in HPI  Review of Systems   Constitutional:  Negative for chills, fever and unexpected weight change. HENT:  Negative for hearing loss, nosebleeds and sore throat. Eyes:  Negative for pain, redness and visual disturbance. Respiratory:  Negative for cough, shortness of breath and wheezing. Cardiovascular:  Negative for chest pain, palpitations and leg swelling. Gastrointestinal:  Negative for abdominal pain, nausea and vomiting.    Endocrine: Negative for polydipsia and polyuria. Genitourinary:  Negative for dysuria and hematuria. Musculoskeletal:  Positive for arthralgias. Skin:  Negative for rash and wound. Neurological:  Negative for dizziness, light-headedness and headaches. Psychiatric/Behavioral:  Negative for decreased concentration, dysphoric mood and suicidal ideas. The patient is not nervous/anxious.         Past Medical History:   Diagnosis Date    Abnormal glucose     last assessed [id number]    Anemia     last assessed [id number]    Benign neoplasm of descending colon 2009    Chondromalacia of patella 2011    unspecified laterality    Diverticulitis of colon 2011    Ganglion     last assessed [id number]    Gastroenteritis     last assessed [id number]    Glucose intolerance     last assessed [id number]    Hyperesthesia 2010    Rectal hemorrhage 2010       Past Surgical History:   Procedure Laterality Date    ADENOIDECTOMY  1950    INCISIONAL BREAST BIOPSY Left 1980    L breast did bx     NECK SURGERY  1950    neck gland removed as child [address]  1950       Family History   Problem Relation Age of Onset    Alzheimer's disease Mother     Heart attack Father     Alcohol abuse Neg Hx     Substance Abuse Neg Hx        Social History     Occupational History    Not on file   Tobacco Use    Smoking status: Former     Types: Cigarettes     Quit date:      Years since quittin.9    Smokeless tobacco: Never    Tobacco comments:     quit in [id number]   Vaping Use    Vaping Use: Never used   Substance and Sexual Activity    Alcohol use: Yes     Comment: social    Drug use: No    Sexual activity: Not on file         Current Outpatient Medications:     albuterol (PROVENTIL HFA,VENTOLIN HFA) 90 mcg/act inhaler, Inhale 2 puffs every 6 (six) hours as needed for wheezing or shortness of breath, Disp: 8 g, Rfl: 1    Alpha-Lipoic Acid 600 MG CAPS, Take by mouth, Disp: , Rfl:     aspirin 81 mg chewable tablet, Chew 1 tablet daily, Disp: , Rfl:     Cholecalciferol (VITAMIN D3) 400 units CAPS, Take by mouth, Disp: , Rfl:     fluticasone (FLONASE) 50 mcg/act nasal spray, 1 spray into each nostril daily, Disp: 9.9 mL, Rfl: 1    glucose blood (Randy Contour Next Test) test strip, 1 each by Other route as needed (Use once daily), Disp: 100 each, Rfl: 3    hydrochlorothiazide (HYDRODIURIL) 25 mg tablet, Take 0.5 tablets (12.5 mg total) by mouth daily, Disp: 45 tablet, Rfl: 3    lisinopril (ZESTRIL) 40 mg tablet, Take 1 tablet (40 mg total) by mouth daily, Disp: 90 tablet, Rfl: 1    MICROLET LANCETS MISC, Test sugars twice a day, Disp: 100 each, Rfl: 3    multivitamin-minerals (CENTRUM ADULTS) tablet, Take 1 tablet by mouth daily, Disp: , Rfl:     ofloxacin (OCUFLOX) 0.3 % ophthalmic solution, INSTILL 1 DROP TO OPERATIVE EYE 4 TIMES A DAY AS DIRECTED, Disp: , Rfl:     omeprazole (PriLOSEC) 40 MG capsule, TAKE 1 CAPSULE BY MOUTH  DAILY, Disp: 90 capsule, Rfl: 3    prednisoLONE acetate (PRED FORTE) 1 % ophthalmic suspension, INSTILL 1 DROP TO OPERATIVE EYE 4 TIMES A DAY AS DIRECTED, Disp: , Rfl:     SF 5000 Plus 1.1 % CREA, BRUSH TWICE A DAY AND DO NOT DRINK AFTER BRUSHING AT NIGHT, Disp: , Rfl:     simvastatin (ZOCOR) 5 MG tablet, TAKE 1 TABLET BY MOUTH  DAILY AT BEDTIME, Disp: 90 tablet, Rfl: 3    Allergies   Allergen Reactions    Iodine - Food Allergy      Other reaction(s): was told by nurse: heat rash? Vitals:    12/01/23 1115   BP: 111/67   Pulse: 81       Body mass index is 30.83 kg/m². Wt Readings from Last 3 Encounters:   12/01/23 86.6 kg (191 lb)   11/06/23 88 kg (194 lb)   10/19/23 88.4 kg (194 lb 12.8 oz)       Objective:                    Right Knee Exam     Tenderness   The patient is experiencing tenderness in the medial joint line.     Range of Motion   Extension:  0   Flexion:  120     Tests   Varus: negative Valgus: negative  Drawer:  Anterior - negative Posterior - negative  Patellar apprehension: negative    Other   Erythema: absent  Sensation: normal  Pulse: present  Swelling: none  Effusion: no effusion present    Comments:  Calf is soft and nontender            Diagnostics, reviewed and taken today if performed as documented:    None performed      The attending physician has personally reviewed the pertinent films in PACS and interpretation is as follows:      Procedures, if performed today:    Large joint arthrocentesis: R knee  Universal Protocol:  Consent: Verbal consent obtained. Risks and benefits: risks, benefits and alternatives were discussed  Consent given by: patient  Time out: Immediately prior to procedure a "time out" was called to verify the correct patient, procedure, equipment, support staff and site/side marked as required. Timeout called at: 12/1/2023 11:56 AM.  Patient understanding: patient states understanding of the procedure being performed  Site marked: the operative site was marked  Patient identity confirmed: verbally with patient  Supporting Documentation  Indications: pain   Procedure Details  Location: knee - R knee  Preparation: Patient was prepped and draped in the usual sterile fashion  Needle size: 22 G  Ultrasound guidance: no  Approach: anterolateral  Medications administered: 30 mg ketorolac 30 mg/mL; 1 mL bupivacaine 0.25 %; 40 mg triamcinolone acetonide 40 mg/mL    Patient tolerance: patient tolerated the procedure well with no immediate complications  Dressing:  Sterile dressing applied          Portions of the record may have been created with voice recognition software. Occasional wrong word or "sound a like" substitutions may have occurred due to the inherent limitations of voice recognition software. Read the chart carefully and recognize, using context, where substitutions have occurred.

## 2024-02-20 ENCOUNTER — TELEPHONE (OUTPATIENT)
Age: 79
End: 2024-02-20

## 2024-02-20 NOTE — TELEPHONE ENCOUNTER
Called and left message with the patients wife.     Please double check with the patient if he is experiencing SOB, if so he must be rescheduled to a hospital setting. If not, ok to proceed at [facility] per the Anesthesiologist.

## 2024-02-27 ENCOUNTER — HOSPITAL ENCOUNTER (OUTPATIENT)
Dept: GASTROENTEROLOGY | Facility: AMBULATORY SURGERY CENTER | Age: 79
Discharge: HOME | End: 2024-02-27
Payer: MEDICARE

## 2024-02-27 ENCOUNTER — ANESTHESIA (OUTPATIENT)
Dept: GASTROENTEROLOGY | Facility: AMBULATORY SURGERY CENTER | Age: 79
End: 2024-02-27

## 2024-02-27 ENCOUNTER — ANESTHESIA EVENT (OUTPATIENT)
Dept: GASTROENTEROLOGY | Facility: AMBULATORY SURGERY CENTER | Age: 79
End: 2024-02-27

## 2024-02-27 VITALS
OXYGEN SATURATION: 99 % | RESPIRATION RATE: 19 BRPM | DIASTOLIC BLOOD PRESSURE: 67 MMHG | HEART RATE: 90 BPM | TEMPERATURE: 98.9 F | SYSTOLIC BLOOD PRESSURE: 136 MMHG | WEIGHT: 177 LBS | HEIGHT: 66 IN | BODY MASS INDEX: 28.45 KG/M2

## 2024-02-27 DIAGNOSIS — Z12.11 SCREENING FOR COLON CANCER: ICD-10-CM

## 2024-02-27 PROCEDURE — G0105 COLORECTAL SCRN; HI RISK IND: HCPCS | Performed by: INTERNAL MEDICINE

## 2024-02-27 RX ORDER — LIDOCAINE HYDROCHLORIDE 10 MG/ML
INJECTION, SOLUTION EPIDURAL; INFILTRATION; INTRACAUDAL; PERINEURAL AS NEEDED
Status: DISCONTINUED | OUTPATIENT
Start: 2024-02-27 | End: 2024-02-27

## 2024-02-27 RX ORDER — SODIUM CHLORIDE, SODIUM LACTATE, POTASSIUM CHLORIDE, CALCIUM CHLORIDE 600; 310; 30; 20 MG/100ML; MG/100ML; MG/100ML; MG/100ML
INJECTION, SOLUTION INTRAVENOUS CONTINUOUS PRN
Status: DISCONTINUED | OUTPATIENT
Start: 2024-02-27 | End: 2024-02-27

## 2024-02-27 RX ORDER — SODIUM CHLORIDE, SODIUM LACTATE, POTASSIUM CHLORIDE, CALCIUM CHLORIDE 600; 310; 30; 20 MG/100ML; MG/100ML; MG/100ML; MG/100ML
50 INJECTION, SOLUTION INTRAVENOUS CONTINUOUS
Status: DISCONTINUED | OUTPATIENT
Start: 2024-02-27 | End: 2024-03-02 | Stop reason: HOSPADM

## 2024-02-27 RX ORDER — PROPOFOL 10 MG/ML
INJECTION, EMULSION INTRAVENOUS AS NEEDED
Status: DISCONTINUED | OUTPATIENT
Start: 2024-02-27 | End: 2024-02-27

## 2024-02-27 RX ADMIN — LIDOCAINE HYDROCHLORIDE 50 MG: 10 INJECTION, SOLUTION EPIDURAL; INFILTRATION; INTRACAUDAL; PERINEURAL at 11:03

## 2024-02-27 RX ADMIN — PROPOFOL 50 MG: 10 INJECTION, EMULSION INTRAVENOUS at 11:11

## 2024-02-27 RX ADMIN — PROPOFOL 50 MG: 10 INJECTION, EMULSION INTRAVENOUS at 11:03

## 2024-02-27 RX ADMIN — PROPOFOL 50 MG: 10 INJECTION, EMULSION INTRAVENOUS at 11:16

## 2024-02-27 RX ADMIN — SODIUM CHLORIDE, SODIUM LACTATE, POTASSIUM CHLORIDE, CALCIUM CHLORIDE: 600; 310; 30; 20 INJECTION, SOLUTION INTRAVENOUS at 10:38

## 2024-02-27 RX ADMIN — SODIUM CHLORIDE, SODIUM LACTATE, POTASSIUM CHLORIDE, CALCIUM CHLORIDE 50 ML/HR: 600; 310; 30; 20 INJECTION, SOLUTION INTRAVENOUS at 10:00

## 2024-02-27 RX ADMIN — PROPOFOL 50 MG: 10 INJECTION, EMULSION INTRAVENOUS at 11:06

## 2024-02-27 NOTE — ANESTHESIA PREPROCEDURE EVALUATION
Procedure:  COLONOSCOPY    Relevant Problems   CARDIO   (+) Dyspnea on exertion   (+) Ectatic abdominal aorta (HCC)   (+) Essential hypertension   (+) Mixed hyperlipidemia      ENDO   (+) Type 2 diabetes mellitus with microalbuminuria, without long-term current use of insulin       GI/HEPATIC   (+) Gastroesophageal reflux disease without esophagitis   (+) Hiatal hernia      /RENAL   (+) Stage 3 chronic kidney disease, unspecified whether stage 3a or 3b CKD (HCC)      MUSCULOSKELETAL   (+) Hiatal hernia   (+) Osteoarthritis of right knee      NEURO/PSYCH   (+) Diabetic polyneuropathy associated with type 2 diabetes mellitus (HCC)      PULMONARY   (+) Dyspnea on exertion        Physical Exam    Airway    Mallampati score: III  TM Distance: >3 FB  Neck ROM: full     Dental   No notable dental hx     Cardiovascular      Pulmonary      Other Findings        Anesthesia Plan  ASA Score- 3     Anesthesia Type- IV sedation with anesthesia with ASA Monitors.         Additional Monitors:     Airway Plan:            Plan Factors-Exercise tolerance (METS): >4 METS.    Chart reviewed.   Existing labs reviewed. Patient summary reviewed.    Patient is not a current smoker.              Induction- intravenous.    Postoperative Plan-     Informed Consent- Anesthetic plan and risks discussed with patient.  I personally reviewed this patient with the CRNA. Discussed and agreed on the Anesthesia Plan with the CRNA..

## 2024-02-27 NOTE — ANESTHESIA PROCEDURE NOTES
Anesthesia Notable Event    Date/Time: 2/27/2024 11:50 AM    Performed by: Bora Eid MD  Authorized by: Bora Eid MD

## 2024-02-27 NOTE — ANESTHESIA POSTPROCEDURE EVALUATION
Post-Op Assessment Note    CV Status:  Stable  Pain Score: 0    Pain management: adequate       Mental Status:  Alert and awake   Hydration Status:  Euvolemic   PONV Controlled:  Controlled   Airway Patency:  Patent     Post Op Vitals Reviewed: Yes    No anethesia notable event occurred.    Staff: CRNA           BP   117/78   Temp      Pulse 88   Resp 14   SpO2 95%RA

## 2024-02-27 NOTE — H&P
History and Physical - [facility]  Oma Cota 78 y.o. male MRN: [id number]    HPI: Oma Cota is a 78 y.o. year old male who presents for surveillance colonoscopy for personal history of polyps    REVIEW OF SYSTEMS: Per the HPI, and otherwise unremarkable.    Historical Information   Past Medical History:   Diagnosis Date    Abnormal glucose     last assessed 2012    Anemia     last assessed [id number]    Benign neoplasm of descending colon 2009    Chondromalacia of patella 2011    unspecified laterality    Colon polyp     Diabetes mellitus (HCC)     Diverticulitis of colon 2011    Ganglion     last assessed [id number]    Gastroenteritis     last assessed [id number]    Glucose intolerance     last assessed [id number]    Hyperesthesia 2010    Hyperlipidemia     Hypertension     Rectal hemorrhage 2010     Past Surgical History:   Procedure Laterality Date    ADENOIDECTOMY  1950    1950    INCISIONAL BREAST BIOPSY Left 1980    L breast did bx     NECK SURGERY  1950    neck gland removed as child     TONSILLECTOMY  1950     Social History   Social History     Substance and Sexual Activity   Alcohol Use Yes    Comment: social     Social History     Substance and Sexual Activity   Drug Use No     Social History     Tobacco Use   Smoking Status Former    Current packs/day: 0.00    Types: Cigarettes    Quit date:     Years since quittin.1   Smokeless Tobacco Never   Tobacco Comments    quit in 3606-1155     Family History   Problem Relation Age of Onset    Alzheimer's disease Mother     Heart attack Father     Alcohol abuse Neg Hx     Substance Abuse Neg Hx        Meds/Allergies       Current Outpatient Medications:     Alpha-Lipoic Acid 600 MG CAPS    aspirin 81 mg chewable tablet    Cholecalciferol (VITAMIN D3) 400 units CAPS    glucose blood (Randy Contour Next Test) test strip    hydrochlorothiazide (HYDRODIURIL) 25  "mg tablet    lisinopril (ZESTRIL) 40 mg tablet    MICROLET LANCETS MISC    multivitamin-minerals (CENTRUM ADULTS) tablet    ofloxacin (OCUFLOX) 0.3 % ophthalmic solution    omeprazole (PriLOSEC) 40 MG capsule    prednisoLONE acetate (PRED FORTE) 1 % ophthalmic suspension    simvastatin (ZOCOR) 5 MG tablet    albuterol (PROVENTIL HFA,VENTOLIN HFA) 90 mcg/act inhaler    fluticasone (FLONASE) 50 mcg/act nasal spray    SF 5000 Plus 1.1 % CREA    Current Facility-Administered Medications:     lactated ringers infusion, 50 mL/hr, Intravenous, Continuous, 50 mL/hr at 02/27/24 1000    Allergies   Allergen Reactions    Iodine - Food Allergy      Other reaction(s): was told by nurse: heat rash?       Objective     /73   Pulse (!) 115 Comment: ST  Temp 98.9 °F (37.2 °C) (Temporal)   Resp 18   Ht 5' 6\" (1.676 m)   Wt 80.3 kg (177 lb)   SpO2 98%   BMI 28.57 kg/m²     PHYSICAL EXAM    Gen: NAD AAOx3  Head: Normocephalic, Atraumatic  CV: S1S2 RRR no m/r/g  CHEST: Clear b/l no c/r/w  ABD: soft, +BS NT/ND  EXT: no edema    ASSESSMENT/PLAN:  This is a 78 y.o. year old male here for colonoscopy, and he is stable and optimized for his procedure.        "

## 2024-03-01 ENCOUNTER — OFFICE VISIT (OUTPATIENT)
Dept: OBGYN CLINIC | Facility: CLINIC | Age: 79
End: 2024-03-01
Payer: MEDICARE

## 2024-03-01 VITALS — BODY MASS INDEX: 28.45 KG/M2 | WEIGHT: 177 LBS | HEIGHT: 66 IN

## 2024-03-01 DIAGNOSIS — M17.11 PRIMARY OSTEOARTHRITIS OF RIGHT KNEE: Primary | ICD-10-CM

## 2024-03-01 PROCEDURE — 99213 OFFICE O/P EST LOW 20 MIN: CPT | Performed by: ORTHOPAEDIC SURGERY

## 2024-03-01 PROCEDURE — 20610 DRAIN/INJ JOINT/BURSA W/O US: CPT | Performed by: ORTHOPAEDIC SURGERY

## 2024-03-01 RX ORDER — KETOROLAC TROMETHAMINE 30 MG/ML
30 INJECTION, SOLUTION INTRAMUSCULAR; INTRAVENOUS
Status: COMPLETED | OUTPATIENT
Start: 2024-03-01 | End: 2024-03-01

## 2024-03-01 RX ORDER — TRIAMCINOLONE ACETONIDE 40 MG/ML
40 INJECTION, SUSPENSION INTRA-ARTICULAR; INTRAMUSCULAR
Status: COMPLETED | OUTPATIENT
Start: 2024-03-01 | End: 2024-03-01

## 2024-03-01 RX ORDER — BUPIVACAINE HYDROCHLORIDE 2.5 MG/ML
2 INJECTION, SOLUTION INFILTRATION; PERINEURAL
Status: COMPLETED | OUTPATIENT
Start: 2024-03-01 | End: 2024-03-01

## 2024-03-01 RX ADMIN — KETOROLAC TROMETHAMINE 30 MG: 30 INJECTION, SOLUTION INTRAMUSCULAR; INTRAVENOUS at 10:15

## 2024-03-01 RX ADMIN — TRIAMCINOLONE ACETONIDE 40 MG: 40 INJECTION, SUSPENSION INTRA-ARTICULAR; INTRAMUSCULAR at 10:15

## 2024-03-01 RX ADMIN — BUPIVACAINE HYDROCHLORIDE 2 ML: 2.5 INJECTION, SOLUTION INFILTRATION; PERINEURAL at 10:15

## 2024-03-01 NOTE — PROGRESS NOTES
Orthopedics          Oma Cota 78 y.o. male MRN: [id number]      Chief Complaint:   right knee pain    HPI:   78 y.o.male complaining of right knee pain.  He states having pain in his right knee.  He has had multiple intra-articular corticosteroid injections states his most recent injection of Kenalog gave him significant pain relief however the past week he has noticed increasing pain in his right knee pain is worse weightbearing mildly relieved with rest pain is aching nature localized to his right knee denies any instability clicking catching numbness tingling                Review Of Systems:   Skin: Normal  Neuro: See HPI  Musculoskeletal: See HPI  All other systems reviewed and are negative    Past Medical History:   Past Medical History:   Diagnosis Date    Abnormal glucose     last assessed [id number]    Anemia     last assessed [id number]    Benign neoplasm of descending colon 12/01/2009    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    unspecified laterality    Colon polyp     Diabetes mellitus (HCC)     Diverticulitis of colon 02/22/2011    Ganglion     last assessed [id number]    Gastroenteritis     last assessed [id number]    Glucose intolerance     last assessed [id number]    Hyperesthesia 11/30/2010    Hyperlipidemia     Hypertension     Rectal hemorrhage 06/01/2010       Past Surgical History:   Past Surgical History:   Procedure Laterality Date    ADENOIDECTOMY  01/01/1950 1950    INCISIONAL BREAST BIOPSY Left 01/01/1980    L breast did bx 1980    NECK SURGERY  01/01/1950    neck gland removed as child 1950    TONSILLECTOMY  01/01/1950 1950       Family History:  Family history reviewed and non-contributory  Family History   Problem Relation Age of Onset    Alzheimer's disease Mother     Heart attack Father     Alcohol abuse Neg Hx     Substance Abuse Neg Hx          Social History:  Social History     Socioeconomic History    Marital status: /Civil Union     Spouse name: None    Number of  children: None    Years of education: None    Highest education level: None   Occupational History    None   Tobacco Use    Smoking status: Former     Current packs/day: 0.00     Types: Cigarettes     Quit date:      Years since quittin.1    Smokeless tobacco: Never    Tobacco comments:     quit in 1784-1680   Vaping Use    Vaping status: Never Used   Substance and Sexual Activity    Alcohol use: Yes     Comment: social    Drug use: No    Sexual activity: None   Other Topics Concern    None   Social History Narrative    Active directive in chart    Always uses seatbelt    Drinks caffeinated tea, 4-6 cups hot tea/day     Social Determinants of Health     Financial Resource Strain: Patient Declined (2023)    Overall Financial Resource Strain (CARDIA)     Difficulty of Paying Living Expenses: Patient declined   Food Insecurity: Not on file   Transportation Needs: No Transportation Needs (2023)    PRAPARE - Transportation     Lack of Transportation (Medical): No     Lack of Transportation (Non-Medical): No   Physical Activity: Not on file   Stress: Not on file   Social Connections: Not on file   Intimate Partner Violence: Not on file   Housing Stability: Not on file       Allergies:   Allergies   Allergen Reactions    Iodine - Food Allergy      Other reaction(s): was told by nurse: heat rash?       Labs:  0   Lab Value Date/Time    HCT 43.5 2023 1015    HCT 43.1 2022 1200    HCT 40.0 2020 0941    HCT 40.2 08/10/2015 0946    HCT 41.2 2014 0805    HGB 14.3 2023 1015    HGB 14.6 2022 1200    HGB 13.4 2020 0941    HGB 14.0 08/10/2015 0946    HGB 14.3 2014 0805    WBC 8.79 2023 1015    WBC 8.46 2022 1200    WBC 7.64 2020 0941    WBC 7.24 08/10/2015 0946    WBC 6.44 2014 0805       Meds:    Current Outpatient Medications:     albuterol (PROVENTIL HFA,VENTOLIN HFA) 90 mcg/act inhaler, Inhale 2 puffs every 6 (six) hours as needed for  wheezing or shortness of breath, Disp: 8 g, Rfl: 1    Alpha-Lipoic Acid 600 MG CAPS, Take by mouth, Disp: , Rfl:     aspirin 81 mg chewable tablet, Chew 1 tablet daily, Disp: , Rfl:     Cholecalciferol (VITAMIN D3) 400 units CAPS, Take by mouth, Disp: , Rfl:     fluticasone (FLONASE) 50 mcg/act nasal spray, 1 spray into each nostril daily, Disp: 9.9 mL, Rfl: 1    glucose blood (Randy Contour Next Test) test strip, 1 each by Other route as needed (Use once daily), Disp: 100 each, Rfl: 3    hydrochlorothiazide (HYDRODIURIL) 25 mg tablet, Take 0.5 tablets (12.5 mg total) by mouth daily, Disp: 45 tablet, Rfl: 3    lisinopril (ZESTRIL) 40 mg tablet, Take 1 tablet (40 mg total) by mouth daily, Disp: 90 tablet, Rfl: 1    MICROLET LANCETS MISC, Test sugars twice a day, Disp: 100 each, Rfl: 3    multivitamin-minerals (CENTRUM ADULTS) tablet, Take 1 tablet by mouth daily, Disp: , Rfl:     ofloxacin (OCUFLOX) 0.3 % ophthalmic solution, INSTILL 1 DROP TO OPERATIVE EYE 4 TIMES A DAY AS DIRECTED, Disp: , Rfl:     omeprazole (PriLOSEC) 40 MG capsule, TAKE 1 CAPSULE BY MOUTH  DAILY, Disp: 90 capsule, Rfl: 3    prednisoLONE acetate (PRED FORTE) 1 % ophthalmic suspension, INSTILL 1 DROP TO OPERATIVE EYE 4 TIMES A DAY AS DIRECTED, Disp: , Rfl:     simvastatin (ZOCOR) 5 MG tablet, TAKE 1 TABLET BY MOUTH  DAILY AT BEDTIME, Disp: 90 tablet, Rfl: 3    SF 5000 Plus 1.1 % CREA, BRUSH TWICE A DAY AND DO NOT DRINK AFTER BRUSHING AT NIGHT (Patient not taking: Reported on 3/1/2024), Disp: , Rfl:   No current facility-administered medications for this visit.    Facility-Administered Medications Ordered in Other Visits:     lactated ringers infusion, 50 mL/hr, Intravenous, Continuous, Savita Gutierrez MD, Stopped at 02/27/24 1148    Body mass index is 28.57 kg/m².  Wt Readings from Last 3 Encounters:   03/01/24 80.3 kg (177 lb)   02/27/24 80.3 kg (177 lb)   12/01/23 86.6 kg (191 lb)     Physical Exam:   There were no vitals filed for this  visit.    General Appearance:    Alert, cooperative, no distress, appears stated age   Head:    Normocephalic, without obvious abnormality, atraumatic   Eyes:    conjunctiva/corneas clear, both eyes        Nose:   Nares normal, septum midline, no drainage    Throat:   Lips normal; teeth and gums normal   Neck:    symmetrical, trachea midline, ;     thyroid:  no enlargement/   Back:     Symmetric, no curvature, ROM normal   Lungs:   No audible wheezing or labored breathing   Chest Wall:    No tenderness or deformity    Heart:    Regular rate and rhythm               Pulses:   2+ and symmetric all extremities   Skin:   Skin color, texture, turgor normal, no rashes or lesions   Neurologic:   normal strength, sensation and reflexes     throughout       Musculoskeletal: right lower extremity  On examination of the right knee there is no effusion, no erythema.  Range of motion to full active extension and flexion to greater than 120°.  Pain on palpation medial and lateral joint lines.  There is crepitus with range of motion, no warmth to palpation, bony enlargement noted. No pain on palpation pes anserine bursa region or distal iliotibial band.  Stable to varus and valgus stress without pain or gapping.  Negative anterior and posterior drawer testing.  Sensation intact distal pulses present.    Large joint arthrocentesis: R knee  Universal Protocol:  Consent given by: patient  Patient understanding: patient states understanding of the procedure being performed  Site marked: the operative site was marked  Patient identity confirmed: verbally with patient  Supporting Documentation  Indications: pain   Procedure Details  Location: knee - R knee  Needle size: 22 G  Approach: anterolateral  Medications administered: 2 mL bupivacaine 0.25 %; 40 mg triamcinolone acetonide 40 mg/mL; 30 mg ketorolac 30 mg/mL    Patient tolerance: patient tolerated the procedure well with no immediate complications          _*_*_*_*_*_*_*_*_*_*_*_*_*_*_*_*_*_*_*_*_*_*_*_*_*_*_*_*_*_*_*_*_*_*_*_*_*_*_*_*_*    Assessment:  78 y.o.male with right knee pain due to arthritis    Plan:   Weight bearing as tolerated  right lower extremity  Right knee intra-articular Kenalog and Toradol injection ministered as noted above  Patient advised should they develop any increasing pain, redness, drainage, numbness, tingling or swelling surrounding the injection site, they are to contact our office or present to the emergency department.  Advised patient to continue home range of motion stretching strength exercises as tolerated  Follow up in 3 months or sooner if needed      Mikey Andrade PA-C                    .

## 2024-03-16 DIAGNOSIS — K20.90 ESOPHAGITIS: ICD-10-CM

## 2024-03-18 RX ORDER — OMEPRAZOLE 40 MG/1
CAPSULE, DELAYED RELEASE ORAL
Qty: 90 CAPSULE | Refills: 3 | Status: SHIPPED | OUTPATIENT
Start: 2024-03-18

## 2024-04-23 ENCOUNTER — OFFICE VISIT (OUTPATIENT)
Dept: FAMILY MEDICINE CLINIC | Facility: CLINIC | Age: 79
End: 2024-04-23
Payer: MEDICARE

## 2024-04-23 VITALS
DIASTOLIC BLOOD PRESSURE: 78 MMHG | BODY MASS INDEX: 29.8 KG/M2 | HEIGHT: 66 IN | RESPIRATION RATE: 16 BRPM | SYSTOLIC BLOOD PRESSURE: 124 MMHG | WEIGHT: 185.4 LBS | OXYGEN SATURATION: 98 % | HEART RATE: 75 BPM

## 2024-04-23 DIAGNOSIS — E78.2 MIXED HYPERLIPIDEMIA: ICD-10-CM

## 2024-04-23 DIAGNOSIS — R80.9 TYPE 2 DIABETES MELLITUS WITH MICROALBUMINURIA, WITHOUT LONG-TERM CURRENT USE OF INSULIN (HCC): ICD-10-CM

## 2024-04-23 DIAGNOSIS — I77.811 ECTATIC ABDOMINAL AORTA (HCC): ICD-10-CM

## 2024-04-23 DIAGNOSIS — E11.29 TYPE 2 DIABETES MELLITUS WITH MICROALBUMINURIA, WITHOUT LONG-TERM CURRENT USE OF INSULIN (HCC): ICD-10-CM

## 2024-04-23 DIAGNOSIS — K21.9 GASTROESOPHAGEAL REFLUX DISEASE WITHOUT ESOPHAGITIS: ICD-10-CM

## 2024-04-23 DIAGNOSIS — N18.30 STAGE 3 CHRONIC KIDNEY DISEASE, UNSPECIFIED WHETHER STAGE 3A OR 3B CKD (HCC): ICD-10-CM

## 2024-04-23 DIAGNOSIS — K14.6 TONGUE PAIN: ICD-10-CM

## 2024-04-23 DIAGNOSIS — M25.552 PAIN OF LEFT HIP: ICD-10-CM

## 2024-04-23 DIAGNOSIS — H91.13 PRESBYCUSIS OF BOTH EARS: ICD-10-CM

## 2024-04-23 DIAGNOSIS — Z00.00 MEDICARE ANNUAL WELLNESS VISIT, SUBSEQUENT: ICD-10-CM

## 2024-04-23 DIAGNOSIS — I10 ESSENTIAL HYPERTENSION: Primary | ICD-10-CM

## 2024-04-23 DIAGNOSIS — M17.11 PRIMARY OSTEOARTHRITIS OF RIGHT KNEE: ICD-10-CM

## 2024-04-23 DIAGNOSIS — R06.09 DYSPNEA ON EXERTION: ICD-10-CM

## 2024-04-23 DIAGNOSIS — E55.9 VITAMIN D DEFICIENCY: ICD-10-CM

## 2024-04-23 PROCEDURE — 93000 ELECTROCARDIOGRAM COMPLETE: CPT | Performed by: FAMILY MEDICINE

## 2024-04-23 PROCEDURE — 99214 OFFICE O/P EST MOD 30 MIN: CPT | Performed by: FAMILY MEDICINE

## 2024-04-23 PROCEDURE — G0439 PPPS, SUBSEQ VISIT: HCPCS | Performed by: FAMILY MEDICINE

## 2024-04-23 RX ORDER — LISINOPRIL 40 MG/1
40 TABLET ORAL DAILY
Qty: 90 TABLET | Refills: 1 | Status: SHIPPED | OUTPATIENT
Start: 2024-04-23

## 2024-04-23 NOTE — PATIENT INSTRUCTIONS
Get your blood work done this week it is fasting with urine  Try toms of [place] toothpaste  For the x-ray of your left hip you can just go to any place [facility] does x-rays and get it done without an appointment  We will schedule you initially for 6 months for recheck but we may need to change it depending on what your lab results are  Please wear your hearing aid when you come            Medicare Preventive Visit Patient Instructions  Thank you for completing your Welcome to Medicare Visit or Medicare Annual Wellness Visit today. Your next wellness visit will be due in one year (4/24/2025).  The screening/preventive services that you may require over the next 5-10 years are detailed below. Some tests may not apply to you based off risk factors and/or age. Screening tests ordered at today's visit but not completed yet may show as past due. Also, please note that scanned in results may not display below.  Preventive Screenings:  Service Recommendations Previous Testing/Comments   Colorectal Cancer Screening  Colonoscopy    Fecal Occult Blood Test (FOBT)/Fecal Immunochemical Test (FIT)  Fecal DNA/Cologuard Test  Flexible Sigmoidoscopy Age: 45-75 years old   Colonoscopy: every 10 years (May be performed more frequently if at higher risk)  OR  FOBT/FIT: every 1 year  OR  Cologuard: every 3 years  OR  Sigmoidoscopy: every 5 years  Screening may be recommended earlier than age 45 if at higher risk for colorectal cancer. Also, an individualized decision between you and your healthcare provider will decide whether screening between the ages of 76-85 would be appropriate. Colonoscopy: 02/27/2024  FOBT/FIT: Not on file  Cologuard: Not on file  Sigmoidoscopy: Not on file    Screening Current     Prostate Cancer Screening Individualized decision between patient and health care provider in men between ages of 55-69   Medicare will cover every 12 months beginning on the day after your 50th birthday PSA: 1.3 ng/mL     Screening  Not Indicated     Hepatitis C Screening Once for adults born between 1945 and 1965  More frequently in patients at high risk for Hepatitis C Hep C Antibody: Not on file    Screening Current   Diabetes Screening 1-2 times per year if you're at risk for diabetes or have pre-diabetes Fasting glucose: 161 mg/dL (4/20/2023)  A1C: 6.3 % (4/20/2023)  Screening Not Indicated  History Diabetes   Cholesterol Screening Once every 5 years if you don't have a lipid disorder. May order more often based on risk factors. Lipid panel: 04/20/2023  Screening Not Indicated  History Lipid Disorder      Other Preventive Screenings Covered by Medicare:  Abdominal Aortic Aneurysm (AAA) Screening: covered once if your at risk. You're considered to be at risk if you have a family history of AAA or a male between the age of 65-75 who smoking at least 100 cigarettes in your lifetime.  Lung Cancer Screening: covers low dose CT scan once per year if you meet all of the following conditions: (1) Age 55-77; (2) No signs or symptoms of lung cancer; (3) Current smoker or have quit smoking within the last 15 years; (4) You have a tobacco smoking history of at least 20 pack years (packs per day x number of years you smoked); (5) You get a written order from a healthcare provider.  Glaucoma Screening: covered annually if you're considered high risk: (1) You have diabetes OR (2) Family history of glaucoma OR (3)  aged 50 and older OR (4)  American aged 65 and older  Osteoporosis Screening: covered every 2 years if you meet one of the following conditions: (1) Have a vertebral abnormality; (2) On glucocorticoid therapy for more than 3 months; (3) Have primary hyperparathyroidism; (4) On osteoporosis medications and need to assess response to drug therapy.  HIV Screening: covered annually if you're between the age of 15-65. Also covered annually if you are younger than 15 and older than 65 with risk factors for HIV infection. For  pregnant patients, it is covered up to 3 times per pregnancy.    Immunizations:  Immunization Recommendations   Influenza Vaccine Annual influenza vaccination during flu season is recommended for all persons aged >= 6 months who do not have contraindications   Pneumococcal Vaccine   * Pneumococcal conjugate vaccine = PCV13 (Prevnar 13), PCV15 (Vaxneuvance), PCV20 (Prevnar 20)  * Pneumococcal polysaccharide vaccine = PPSV23 (Pneumovax) Adults 19-63 yo with certain risk factors or if 65+ yo  If never received any pneumonia vaccine: recommend Prevnar 20 (PCV20)  Give PCV20 if previously received 1 dose of PCV13 or PPSV23   Hepatitis B Vaccine 3 dose series if at intermediate or high risk (ex: diabetes, end stage renal disease, liver disease)   Respiratory syncytial virus (RSV) Vaccine - COVERED BY MEDICARE PART D  * RSVPreF3 (Arexvy) CDC recommends that adults 60 years of age and older may receive a single dose of RSV vaccine using shared clinical decision-making (SCDM)   Tetanus (Td) Vaccine - COST NOT COVERED BY MEDICARE PART B Following completion of primary series, a booster dose should be given every 10 years to maintain immunity against tetanus. Td may also be given as tetanus wound prophylaxis.   Tdap Vaccine - COST NOT COVERED BY MEDICARE PART B Recommended at least once for all adults. For pregnant patients, recommended with each pregnancy.   Shingles Vaccine (Shingrix) - COST NOT COVERED BY MEDICARE PART B  2 shot series recommended in those 19 years and older who have or will have weakened immune systems or those 50 years and older     Health Maintenance Due:      Topic Date Due    Hepatitis C Screening  05/23/2026 (Originally 1945)    Colorectal Cancer Screening  02/25/2029     Immunizations Due:      Topic Date Due    IPV Vaccine (2 of 3 - Adult catch-up series) 09/25/2006    COVID-19 Vaccine (5 - 2023-24 season) 09/01/2023     Advance Directives   What are advance directives?  Advance directives are  legal documents that state your wishes and plans for medical care. These plans are made ahead of time in case you lose your ability to make decisions for yourself. Advance directives can apply to any medical decision, such as the treatments you want, and if you want to donate organs.   What are the types of advance directives?  There are many types of advance directives, and each state has rules about how to use them. You may choose a combination of any of the following:  Living will:  This is a written record of the treatment you want. You can also choose which treatments you do not want, which to limit, and which to stop at a certain time. This includes surgery, medicine, IV fluid, and tube feedings.   Durable power of  for healthcare (DPAHC):  This is a written record that states who you want to make healthcare choices for you when you are unable to make them for yourself. This person, called a proxy, is usually a family member or a friend. You may choose more than 1 proxy.  Do not resuscitate (DNR) order:  A DNR order is used in case your heart stops beating or you stop breathing. It is a request not to have certain forms of treatment, such as CPR. A DNR order may be included in other types of advance directives.  Medical directive:  This covers the care that you want if you are in a coma, near death, or unable to make decisions for yourself. You can list the treatments you want for each condition. Treatment may include pain medicine, surgery, blood transfusions, dialysis, IV or tube feedings, and a ventilator (breathing machine).  Values history:  This document has questions about your views, beliefs, and how you feel and think about life. This information can help others choose the care that you would choose.  Why are advance directives important?  An advance directive helps you control your care. Although spoken wishes may be used, it is better to have your wishes written down. Spoken wishes can be  misunderstood, or not followed. Treatments may be given even if you do not want them. An advance directive may make it easier for your family to make difficult choices about your care.   Weight Management   Why it is important to manage your weight:  Being overweight increases your risk of health conditions such as heart disease, high blood pressure, type 2 diabetes, and certain types of cancer. It can also increase your risk for osteoarthritis, sleep apnea, and other respiratory problems. Aim for a slow, steady weight loss. Even a small amount of weight loss can lower your risk of health problems.  How to lose weight safely:  A safe and healthy way to lose weight is to eat fewer calories and get regular exercise. You can lose up about 1 pound a week by decreasing the number of calories you eat by 500 calories each day.   Healthy meal plan for weight management:  A healthy meal plan includes a variety of foods, contains fewer calories, and helps you stay healthy. A healthy meal plan includes the following:  Eat whole-grain foods more often.  A healthy meal plan should contain fiber. Fiber is the part of grains, fruits, and vegetables that is not broken down by your body. Whole-grain foods are healthy and provide extra fiber in your diet. Some examples of whole-grain foods are whole-wheat breads and pastas, oatmeal, brown rice, and bulgur.  Eat a variety of vegetables every day.  Include dark, leafy greens such as spinach, kale, tayla greens, and mustard greens. Eat yellow and orange vegetables such as carrots, sweet potatoes, and winter squash.   Eat a variety of fruits every day.  Choose fresh or canned fruit (canned in its own juice or light syrup) instead of juice. Fruit juice has very little or no fiber.  Eat low-fat dairy foods.  Drink fat-free (skim) milk or 1% milk. Eat fat-free yogurt and low-fat cottage cheese. Try low-fat cheeses such as mozzarella and other reduced-fat cheeses.  Choose meat and other  protein foods that are low in fat.  Choose beans or other legumes such as split peas or lentils. Choose fish, skinless poultry (chicken or turkey), or lean cuts of red meat (beef or pork). Before you cook meat or poultry, cut off any visible fat.   Use less fat and oil.  Try baking foods instead of frying them. Add less fat, such as margarine, sour cream, regular salad dressing and mayonnaise to foods. Eat fewer high-fat foods. Some examples of high-fat foods include french fries, doughnuts, ice cream, and cakes.  Eat fewer sweets.  Limit foods and drinks that are high in sugar. This includes candy, cookies, regular soda, and sweetened drinks.  Exercise:  Exercise at least 30 minutes per day on most days of the week. Some examples of exercise include walking, biking, dancing, and swimming. You can also fit in more physical activity by taking the stairs instead of the elevator or parking farther away from stores. Ask your healthcare provider about the best exercise plan for you.      © Copyright [employer] 2018 Information is for End User's use only and may not be sold, redistributed or otherwise used for commercial purposes. All illustrations and images included in CareNotes® are the copyrighted property of [employer]., [employer] or [employer]

## 2024-04-23 NOTE — PROGRESS NOTES
Diabetic Foot Exam    Patient's shoes and socks removed.    Right Foot/Ankle   Right Foot Inspection  Skin Exam: skin normal and skin intact. No dry skin, no warmth, no callus, no erythema, no maceration, no abnormal color, no pre-ulcer, no ulcer and no callus.     Toe Exam: ROM and strength within normal limits.     Sensory   Monofilament testing: intact    Vascular  The right DP pulse is 1+. The right PT pulse is 1+.     Left Foot/Ankle  Left Foot Inspection  Skin Exam: skin normal and skin intact. No dry skin, no warmth, no erythema, no maceration, normal color, no pre-ulcer, no ulcer and no callus.     Toe Exam: ROM and strength within normal limits.     Sensory   Monofilament testing: intact    Vascular  The left DP pulse is 1+. The left PT pulse is 1+.     Assign Risk Category  No deformity present  No loss of protective sensation  Weak pulses  Risk: 1   Assessment and Plan:   Up to date  Problem List Items Addressed This Visit       RESOLVED: Dyspnea on exertion    Ectatic abdominal aorta (HCC)    Essential hypertension - Primary    Relevant Medications    lisinopril (ZESTRIL) 40 mg tablet    Other Relevant Orders    POCT ECG (Completed)    Gastroesophageal reflux disease without esophagitis    Hearing loss    Mixed hyperlipidemia    Osteoarthritis of right knee    Stage 3 chronic kidney disease, unspecified whether stage 3a or 3b CKD (HCC)    Type 2 diabetes mellitus with microalbuminuria, without long-term current use of insulin (HCC)    Relevant Orders    Hemoglobin A1C    Vitamin D deficiency     Other Visit Diagnoses       Medicare annual wellness visit, subsequent        Pain of left hip        Relevant Orders    XR hip/pelv 2-3 vws left if performed            Depression Screening and Follow-up Plan: Patient was screened for depression during today's encounter. They screened negative with a PHQ-2 score of 0.      Preventive health issues were discussed with patient, and age appropriate screening tests  were ordered as noted in patient's After Visit Summary.  Personalized health advice and appropriate referrals for health education or preventive services given if needed, as noted in patient's After Visit Summary.     History of Present Illness:     Patient presents for a Medicare Wellness Visit    Hypertension    Hyperlipidemia    Diabetes       Patient Care Team:  Ana Sommer DO as PCP - General     Review of Systems:     Review of Systems     Problem List:     Patient Active Problem List   Diagnosis    Actinic keratosis    Allergic rhinitis    De Quervain's tenosynovitis, right    Elastosis of skin    Gastroesophageal reflux disease without esophagitis    Hiatal hernia    Mixed hyperlipidemia    Essential hypertension    Tinnitus    Adjustment insomnia    Trigger middle finger of right hand    Vitamin D deficiency    Type 2 diabetes mellitus with microalbuminuria, without long-term current use of insulin (HCC)    Diabetic polyneuropathy associated with type 2 diabetes mellitus (HCC)    Primary open angle glaucoma (POAG) of both eyes, mild stage    Diverticulosis of colon    Hearing loss    Ectatic abdominal aorta (HCC)    Osteoarthritis of right knee    Stage 3 chronic kidney disease, unspecified whether stage 3a or 3b CKD (HCC)    Myopia of both eyes    Presbyopia    Vitreous degeneration of both eyes      Past Medical and Surgical History:     Past Medical History:   Diagnosis Date    Abnormal glucose     last assessed [id number]    Anemia     last assessed [id number]    Benign neoplasm of descending colon 12/01/2009    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    unspecified laterality    Colon polyp     Diabetes mellitus (HCC)     Diverticulitis of colon 02/22/2011    Ganglion     last assessed [id number]    Gastroenteritis     last assessed [id number]    Glucose intolerance     last assessed [id number]    Hyperesthesia 11/30/2010    Hyperlipidemia     Hypertension     Rectal hemorrhage 06/01/2010     Past Surgical  History:   Procedure Laterality Date    ADENOIDECTOMY  1950    INCISIONAL BREAST BIOPSY Left 1980    L breast did bx     NECK SURGERY  1950    neck gland removed as child     TONSILLECTOMY  1950      Family History:     Family History   Problem Relation Age of Onset    Alzheimer's disease Mother     Heart attack Father     Alcohol abuse Neg Hx     Substance Abuse Neg Hx       Social History:     Social History     Socioeconomic History    Marital status: /Civil Union     Spouse name: None    Number of children: None    Years of education: None    Highest education level: None   Occupational History    None   Tobacco Use    Smoking status: Former     Current packs/day: 0.00     Types: Cigarettes     Quit date:      Years since quittin.3    Smokeless tobacco: Never    Tobacco comments:     quit in [id number]   Vaping Use    Vaping status: Never Used   Substance and Sexual Activity    Alcohol use: Yes     Comment: social    Drug use: No    Sexual activity: None   Other Topics Concern    None   Social History Narrative    Active directive in chart    Always uses seatbelt    Drinks caffeinated tea, 4-6 cups hot tea/day     Social Determinants of Health     Financial Resource Strain: Patient Declined (2023)    Overall Financial Resource Strain (CARDIA)     Difficulty of Paying Living Expenses: Patient declined   Food Insecurity: No Food Insecurity (2024)    Hunger Vital Sign     Worried About Running Out of Food in the Last Year: Never true     Ran Out of Food in the Last Year: Never true   Transportation Needs: No Transportation Needs (2024)    PRAPARE - Transportation     Lack of Transportation (Medical): No     Lack of Transportation (Non-Medical): No   Physical Activity: Not on file   Stress: Not on file   Social Connections: Not on file   Intimate Partner Violence: Not on file   Housing Stability: Low Risk  (2024)    Housing Stability  Vital Sign     Unable to Pay for Housing in the Last Year: No     Number of Places Lived in the Last Year: 1     Unstable Housing in the Last Year: No      Medications and Allergies:     Current Outpatient Medications   Medication Sig Dispense Refill    albuterol (PROVENTIL HFA,VENTOLIN HFA) 90 mcg/act inhaler Inhale 2 puffs every 6 (six) hours as needed for wheezing or shortness of breath 8 g 1    Alpha-Lipoic Acid 600 MG CAPS Take by mouth      aspirin 81 mg chewable tablet Chew 1 tablet daily      Cholecalciferol (VITAMIN D3) 400 units CAPS Take by mouth      fluticasone (FLONASE) 50 mcg/act nasal spray 1 spray into each nostril daily 9.9 mL 1    glucose blood (Randy Contour Next Test) test strip 1 each by Other route as needed (Use once daily) 100 each 3    hydrochlorothiazide (HYDRODIURIL) 25 mg tablet Take 0.5 tablets (12.5 mg total) by mouth daily 45 tablet 3    lisinopril (ZESTRIL) 40 mg tablet Take 1 tablet (40 mg total) by mouth daily 90 tablet 1    MICROLET LANCETS MISC Test sugars twice a day 100 each 3    multivitamin-minerals (CENTRUM ADULTS) tablet Take 1 tablet by mouth daily      ofloxacin (OCUFLOX) 0.3 % ophthalmic solution INSTILL 1 DROP TO OPERATIVE EYE 4 TIMES A DAY AS DIRECTED      omeprazole (PriLOSEC) 40 MG capsule TAKE 1 CAPSULE BY MOUTH DAILY 90 capsule 3    prednisoLONE acetate (PRED FORTE) 1 % ophthalmic suspension INSTILL 1 DROP TO OPERATIVE EYE 4 TIMES A DAY AS DIRECTED      simvastatin (ZOCOR) 5 MG tablet TAKE 1 TABLET BY MOUTH  DAILY AT BEDTIME 90 tablet 3     No current facility-administered medications for this visit.     Allergies   Allergen Reactions    Iodine - Food Allergy      Other reaction(s): was told by nurse: heat rash?      Immunizations:     Immunization History   Administered Date(s) Administered    COVID-19 MODERNA VACC 0.25 ML IM BOOSTER 10/21/2021    COVID-19 MODERNA VACC 0.5 ML IM 01/22/2021, 02/17/2021, 10/21/2021    DTaP / HiB / IPV 08/28/2006    INFLUENZA  11/26/2008, 12/01/2008, 10/13/2009, 10/01/2010, 10/01/2013, 10/01/2015, 09/26/2016, 09/26/2016, 09/29/2017, 09/08/2020, 09/15/2021, 09/02/2022, 10/28/2022    Influenza Split High Dose Preservative Free IM 09/06/2013, 09/15/2014, 09/28/2015, 10/01/2016, 09/29/2017    Influenza, high dose seasonal 0.7 mL 09/21/2018, 09/20/2019, 09/21/2023    Influenza, seasonal, injectable 09/07/2011, 09/21/2012    Lyme Disease 06/26/2001    Pneumococcal 11/01/2007    Pneumococcal Conjugate 13-Valent 02/03/2016, 02/18/2016, 10/01/2016    Pneumococcal Polysaccharide PPV23 10/01/2007, 11/01/2007, 11/30/2012, 04/09/2018    Tdap 02/11/2003, 08/28/2006, 06/02/2012, 07/17/2019    Zoster 09/27/2012, 10/01/2013    Zoster Vaccine Recombinant 03/14/2019, 07/17/2019, 08/25/2021      Health Maintenance:         Topic Date Due    Hepatitis C Screening  05/23/2026 (Originally 1945)    Colorectal Cancer Screening  02/25/2029         Topic Date Due    IPV Vaccine (2 of 3 - Adult catch-up series) 09/25/2006    COVID-19 Vaccine (5 - 2023-24 season) 09/01/2023      Medicare Screening Tests and Risk Assessments:     Oma is here for his Subsequent Wellness visit.     Health Risk Assessment:   Patient rates overall health as fair. Patient feels that their physical health rating is slightly worse. Patient is satisfied with their life. Eyesight was rated as slightly worse. Hearing was rated as same. Patient feels that their emotional and mental health rating is same. Patients states they are never, rarely angry. Patient states they are often unusually tired/fatigued. Pain experienced in the last 7 days has been some. Patient's pain rating has been 8/10. Patient states that he has experienced no weight loss or gain in last 6 months. Hip     Depression Screening:   PHQ-2 Score: 0      Fall Risk Screening:   In the past year, patient has experienced: no history of falling in past year      Home Safety:  Patient has trouble with stairs inside or outside  of their home. Patient has working smoke alarms and has working carbon monoxide detector. Home safety hazards include: none.     Nutrition:   Current diet is Regular and Unhealthy.     Medications:   Patient is currently taking over-the-counter supplements. OTC medications include: see medication list. Patient is able to manage medications.     Activities of Daily Living (ADLs)/Instrumental Activities of Daily Living (IADLs):   Walk and transfer into and out of bed and chair?: Yes  Dress and groom yourself?: Yes    Bathe or shower yourself?: Yes    Feed yourself? Yes  Do your laundry/housekeeping?: Yes  Manage your money, pay your bills and track your expenses?: Yes  Make your own meals?: Yes    Do your own shopping?: Yes    Previous Hospitalizations:   Any hospitalizations or ED visits within the last 12 months?: No      Advance Care Planning:   Living will: Yes    Advanced directive: Yes    End of Life Decisions reviewed with patient: Yes      Cognitive Screening:   Provider or family/friend/caregiver concerned regarding cognition?: No    PREVENTIVE SCREENINGS      Cardiovascular Screening:    General: Screening Not Indicated and History Lipid Disorder      Diabetes Screening:     General: Screening Not Indicated and History Diabetes      Colorectal Cancer Screening:     General: Screening Current      Prostate Cancer Screening:    General: Screening Not Indicated      Osteoporosis Screening:    General: Screening Not Indicated      Abdominal Aortic Aneurysm (AAA) Screening:    Risk factors include: tobacco use        Lung Cancer Screening:     General: Screening Not Indicated      Hepatitis C Screening:    General: Screening Current    Screening, Brief Intervention, and Referral to Treatment (SBIRT)    Screening  Typical number of drinks in a day: 0  Typical number of drinks in a week: 0  Interpretation: Low risk drinking behavior.    Single Item Drug Screening:  How often have you used an illegal drug (including  "marijuana) or a prescription medication for non-medical reasons in the past year? never    Single Item Drug Screen Score: 0  Interpretation: Negative screen for possible drug use disorder    Brief Intervention  Alcohol & drug use screenings were reviewed. No concerns regarding substance use disorder identified.     Other Counseling Topics:   Regular weightbearing exercise and calcium and vitamin D intake.     No results found.     Physical Exam:     /78   Pulse 75   Resp 16   Ht 5' 6\" (1.676 m)   Wt 84.1 kg (185 lb 6.4 oz)   SpO2 98%   BMI 29.92 kg/m²     Physical Exam  Cardiovascular:      Pulses: Pulses are weak.           Dorsalis pedis pulses are 1+ on the right side and 1+ on the left side.        Posterior tibial pulses are 1+ on the right side and 1+ on the left side.   Feet:      Right foot:      Skin integrity: No ulcer, skin breakdown, erythema, warmth, callus or dry skin.      Left foot:      Skin integrity: No ulcer, skin breakdown, erythema, warmth, callus or dry skin.          Ana Sommer, DO  "

## 2024-04-23 NOTE — PROGRESS NOTES
ASSESSMENT/PLAN:    Left hip pain  Also has right knee pain which could be causing the hip pain but the knee is getting injected and feels better  Check x-ray of hip 1st    Mouth pain  Try [employer] [place] toothpaste    Dyspnea of exertion  Patient no longer complains of this so we did not do the pulmonary function test  He did the stress test that was negative  He says is not really an issue at this time    Type 2 diabetes  A1c is pending  Only diet at this time so we will have to see what his A1c is before deciding on medicine and recheck time      Diabetic nephropathy  Last GFR back to normal greater than 60  New GFR pending needing labs  Continue lisinopril     Osteoarthritis of the knee  Continue injections through Ortho     Esophageal reflux  Only omeprazole works  Continue the same     Hyperlipidemia   Continue simvastatin  Labs are pending patient will get them done soon     Hypertension   Blood pressure good 124/78  Continue lisinopril and HCTZ  EKG at this visit read by me as normal        Recheck in 6 mo  Recheck sooner if needed  Patient to get blood work done this week we will call with results he may need to come in if there is a lot of differences  We we will put an appointment in for 6 months now but we may need to move it depending on what the A1c is  He will also get before next visit his left hip x-rayed and will try [employer]s [employer] toothpaste        Depression Screening and Follow-up Plan: Patient was screened for depression during today's encounter. They screened negative with a PHQ-2 score of 0.           Health Maintenance   Topic Date Due    SLP PLAN OF CARE  Never done    IPV Vaccine (2 of 3 - Adult catch-up series) 09/25/2006    COVID-19 Vaccine (5 - 2023-24 season) 09/01/2023    HEMOGLOBIN A1C  10/20/2023    Kidney Health Evaluation: GFR  04/20/2024    Kidney Health Evaluation: Albumin/Creatinine Ratio  04/20/2024    Medicare Annual Wellness Visit (AWV)  04/19/2024    Hepatitis C Screening   05/23/2026 (Originally 1945)    Fall Risk  04/23/2025    Depression Screening  04/23/2025    Diabetic Foot Exam  04/23/2025    DM Eye Exam  06/20/2025    Colorectal Cancer Screening  02/25/2029    Zoster Vaccine  Completed    Pneumococcal Vaccine: 65+ Years  Completed    Influenza Vaccine  Completed    HIB Vaccine  Aged Out    Hepatitis A Vaccine  Aged Out    Meningococcal ACWY Vaccine  Aged Out    HPV Vaccine  Aged Out         Problem List as of 4/23/2024 Reviewed: 12/1/2023  2:10 PM by Theresa Hernandez MD      Actinic keratosis    Adjustment insomnia    Allergic rhinitis    De Quervain's tenosynovitis, right    Diabetic polyneuropathy associated with type 2 diabetes mellitus (HCC)    Diverticulosis of colon    Dyspnea on exertion    Ectatic abdominal aorta (HCC)    Elastosis of skin    Essential hypertension    Gastroesophageal reflux disease without esophagitis    Hearing loss    Hiatal hernia    Mixed hyperlipidemia    Myopia of both eyes    Osteoarthritis of right knee    Presbyopia    Primary open angle glaucoma (POAG) of both eyes, mild stage    Stage 3 chronic kidney disease, unspecified whether stage 3a or 3b CKD (HCC)    Tinnitus    Trigger middle finger of right hand    Type 2 diabetes mellitus with microalbuminuria, without long-term current use of insulin (HCC)    Vitamin D deficiency    Vitreous degeneration of both eyes         Subjective:   Chief Complaint   Patient presents with    Hypertension    Hyperlipidemia    Diabetes    Vitamin D Deficiency    Medicare Wellness Visit     Patient is here for his diabetic recheck and his 6-month recheck on his blood pressure  Since last visit he saw Ortho for his right knee and the injections do help but now he is having left hip pain    Did get his colonoscopy done in February and he had a stress test done that was normal  He forgot to do his blood work but he will get that done soon    He is complaining of tongue pain ever since having a certain mouth  washer toothpaste couple years ago        patient ID: Oma Cota is a 78 y.o. male.    Patient's past medical history, surgical history, family history, social history, and Tobacco history reviewed with patient.     MED LIST WAS REVIEWED AND UPDATED    ROS  As per HPI  Rest of 12 point review of systems negative     Objective:      VITALS:  Wt Readings from Last 3 Encounters:   04/23/24 84.1 kg (185 lb 6.4 oz)   03/01/24 80.3 kg (177 lb)   02/27/24 80.3 kg (177 lb)     BP Readings from Last 3 Encounters:   04/23/24 124/78   02/27/24 136/67   12/01/23 111/67     Pulse Readings from Last 3 Encounters:   04/23/24 75   02/27/24 90   12/01/23 81     Body mass index is 29.92 kg/m².    Laboratory Results:   All pertinent labs and studies were reviewed with patient during this office visit with highlights of the results contained in this note in the ASSESSMENT AND PLAN section       Physical Exam    Constitutional  Hard of hearing  Appears healthy, Looks well, Appearance consistent with age    Mental Status  Alert, Oriented, Cooperative, Memory function normal , clean, and reasonable    Neck  No neck mass, No thyromegaly, Good carotid upstrokes bilaterally, trachea midline positive click    Respiratory  Breath sounds normal, No rales, No rhonchi, No wheezing, normal palpation    Cardiac  Regular rhythm without ectopy or murmur no S3-S4, no heave lift or thrill to palpation    Vascular  No leg edema, No pedal edema    Muscular skeletal  No clubbing cyanosis , muscle tone normal    Skin  No appreciable rashes or abnormal appearing lesions

## 2024-04-24 ENCOUNTER — HOSPITAL ENCOUNTER (OUTPATIENT)
Dept: RADIOLOGY | Facility: HOSPITAL | Age: 79
Discharge: HOME | End: 2024-04-24
Payer: MEDICARE

## 2024-04-24 ENCOUNTER — LAB (OUTPATIENT)
Dept: LAB | Facility: HOSPITAL | Age: 79
End: 2024-04-24
Payer: MEDICARE

## 2024-04-24 DIAGNOSIS — E55.9 VITAMIN D DEFICIENCY: ICD-10-CM

## 2024-04-24 DIAGNOSIS — E11.29 TYPE 2 DIABETES MELLITUS WITH MICROALBUMINURIA, WITHOUT LONG-TERM CURRENT USE OF INSULIN (HCC): ICD-10-CM

## 2024-04-24 DIAGNOSIS — E78.2 MIXED HYPERLIPIDEMIA: ICD-10-CM

## 2024-04-24 DIAGNOSIS — I10 ESSENTIAL HYPERTENSION: ICD-10-CM

## 2024-04-24 DIAGNOSIS — R80.9 TYPE 2 DIABETES MELLITUS WITH MICROALBUMINURIA, WITHOUT LONG-TERM CURRENT USE OF INSULIN (HCC): ICD-10-CM

## 2024-04-24 DIAGNOSIS — M25.552 PAIN OF LEFT HIP: ICD-10-CM

## 2024-04-24 LAB
25(OH)D3 SERPL-MCNC: 27.6 NG/ML (ref 30–100)
ALBUMIN SERPL BCP-MCNC: 4.2 G/DL (ref 3.5–5)
ALP SERPL-CCNC: 66 U/L (ref 34–104)
ALT SERPL W P-5'-P-CCNC: 19 U/L (ref 7–52)
ANION GAP SERPL CALCULATED.3IONS-SCNC: 7 MMOL/L (ref 4–13)
AST SERPL W P-5'-P-CCNC: 15 U/L (ref 13–39)
BASOPHILS # BLD AUTO: 0.05 THOUSANDS/ÂΜL (ref 0–0.1)
BASOPHILS NFR BLD AUTO: 0 % (ref 0–1)
BILIRUB SERPL-MCNC: 0.74 MG/DL (ref 0.2–1)
BILIRUB UR QL STRIP: NEGATIVE
BUN SERPL-MCNC: 18 MG/DL (ref 5–25)
CALCIUM SERPL-MCNC: 9.7 MG/DL (ref 8.4–10.2)
CHLORIDE SERPL-SCNC: 104 MMOL/L (ref 96–108)
CHOLEST SERPL-MCNC: 147 MG/DL
CLARITY UR: CLEAR
CO2 SERPL-SCNC: 28 MMOL/L (ref 21–32)
COLOR UR: YELLOW
CREAT SERPL-MCNC: 1.08 MG/DL (ref 0.6–1.3)
CREAT UR-MCNC: 86.3 MG/DL
EOSINOPHIL # BLD AUTO: 0.22 THOUSAND/ÂΜL (ref 0–0.61)
EOSINOPHIL NFR BLD AUTO: 2 % (ref 0–6)
ERYTHROCYTE [DISTWIDTH] IN BLOOD BY AUTOMATED COUNT: 13.5 % (ref 11.6–15.1)
EST. AVERAGE GLUCOSE BLD GHB EST-MCNC: 146 MG/DL
GFR SERPL CREATININE-BSD FRML MDRD: 65 ML/MIN/1.73SQ M
GLUCOSE P FAST SERPL-MCNC: 154 MG/DL (ref 65–99)
GLUCOSE UR STRIP-MCNC: NEGATIVE MG/DL
HBA1C MFR BLD: 6.7 %
HCT VFR BLD AUTO: 42.3 % (ref 36.5–49.3)
HDLC SERPL-MCNC: 33 MG/DL
HGB BLD-MCNC: 13.5 G/DL (ref 12–17)
HGB UR QL STRIP.AUTO: NEGATIVE
IMM GRANULOCYTES # BLD AUTO: 0.06 THOUSAND/UL (ref 0–0.2)
IMM GRANULOCYTES NFR BLD AUTO: 1 % (ref 0–2)
KETONES UR STRIP-MCNC: NEGATIVE MG/DL
LDLC SERPL CALC-MCNC: 82 MG/DL (ref 0–100)
LEUKOCYTE ESTERASE UR QL STRIP: NEGATIVE
LYMPHOCYTES # BLD AUTO: 2.06 THOUSANDS/ÂΜL (ref 0.6–4.47)
LYMPHOCYTES NFR BLD AUTO: 17 % (ref 14–44)
MCH RBC QN AUTO: 30 PG (ref 26.8–34.3)
MCHC RBC AUTO-ENTMCNC: 31.9 G/DL (ref 31.4–37.4)
MCV RBC AUTO: 94 FL (ref 82–98)
MICROALBUMIN UR-MCNC: <7 MG/L
MICROALBUMIN/CREAT 24H UR: <8 MG/G CREATININE (ref 0–30)
MONOCYTES # BLD AUTO: 1.27 THOUSAND/ÂΜL (ref 0.17–1.22)
MONOCYTES NFR BLD AUTO: 10 % (ref 4–12)
NEUTROPHILS # BLD AUTO: 8.81 THOUSANDS/ÂΜL (ref 1.85–7.62)
NEUTS SEG NFR BLD AUTO: 70 % (ref 43–75)
NITRITE UR QL STRIP: NEGATIVE
NONHDLC SERPL-MCNC: 114 MG/DL
NRBC BLD AUTO-RTO: 0 /100 WBCS
PH UR STRIP.AUTO: 5.5 [PH]
PLATELET # BLD AUTO: 315 THOUSANDS/UL (ref 149–390)
PMV BLD AUTO: 9.9 FL (ref 8.9–12.7)
POTASSIUM SERPL-SCNC: 4.4 MMOL/L (ref 3.5–5.3)
PROT SERPL-MCNC: 7.7 G/DL (ref 6.4–8.4)
PROT UR STRIP-MCNC: NEGATIVE MG/DL
RBC # BLD AUTO: 4.5 MILLION/UL (ref 3.88–5.62)
SODIUM SERPL-SCNC: 139 MMOL/L (ref 135–147)
SP GR UR STRIP.AUTO: 1.02 (ref 1–1.03)
TRIGL SERPL-MCNC: 162 MG/DL
TSH SERPL DL<=0.05 MIU/L-ACNC: 2.73 UIU/ML (ref 0.45–4.5)
UROBILINOGEN UR STRIP-ACNC: <2 MG/DL
WBC # BLD AUTO: 12.47 THOUSAND/UL (ref 4.31–10.16)

## 2024-04-24 PROCEDURE — 80061 LIPID PANEL: CPT

## 2024-04-24 PROCEDURE — 36415 COLL VENOUS BLD VENIPUNCTURE: CPT

## 2024-04-24 PROCEDURE — 82306 VITAMIN D 25 HYDROXY: CPT

## 2024-04-24 PROCEDURE — 84443 ASSAY THYROID STIM HORMONE: CPT

## 2024-04-24 PROCEDURE — 73502 X-RAY EXAM HIP UNI 2-3 VIEWS: CPT

## 2024-04-24 PROCEDURE — 81003 URINALYSIS AUTO W/O SCOPE: CPT

## 2024-04-24 PROCEDURE — 83036 HEMOGLOBIN GLYCOSYLATED A1C: CPT

## 2024-04-24 PROCEDURE — 82570 ASSAY OF URINE CREATININE: CPT

## 2024-04-24 PROCEDURE — 85025 COMPLETE CBC W/AUTO DIFF WBC: CPT

## 2024-04-24 PROCEDURE — 80053 COMPREHEN METABOLIC PANEL: CPT

## 2024-04-24 PROCEDURE — 82043 UR ALBUMIN QUANTITATIVE: CPT

## 2024-04-24 NOTE — RESULT ENCOUNTER NOTE
Please inform patient that his hip x-ray shows very mild arthritis   We can send him to physical therapy if you would like to help them evaluate and work on his hip pain  Please let me know and I will put in a referral

## 2024-04-25 ENCOUNTER — TELEPHONE (OUTPATIENT)
Dept: FAMILY MEDICINE CLINIC | Facility: CLINIC | Age: 79
End: 2024-04-25

## 2024-04-25 DIAGNOSIS — M25.552 PAIN OF LEFT HIP: Primary | ICD-10-CM

## 2024-04-25 NOTE — TELEPHONE ENCOUNTER
----- Message from Ana Sommer DO sent at 4/24/2024  4:35 PM EDT -----  Patient needs a 15-minute appointment in the next couple weeks to discuss his labs    Patient was informed, he scheduled 05/06/2024

## 2024-04-25 NOTE — TELEPHONE ENCOUNTER
----- Message from Ana Sommer DO sent at 4/24/2024  4:43 PM EDT -----  Please inform patient that his hip x-ray shows very mild arthritis   We can send him to physical therapy if you would like to help them evaluate and work on his hip pain  Please let me know and I will put in a referral      Patient was informed. Please put the referral In , patient will go.

## 2024-05-06 ENCOUNTER — OFFICE VISIT (OUTPATIENT)
Dept: FAMILY MEDICINE CLINIC | Facility: CLINIC | Age: 79
End: 2024-05-06
Payer: MEDICARE

## 2024-05-06 VITALS
BODY MASS INDEX: 29.7 KG/M2 | OXYGEN SATURATION: 97 % | WEIGHT: 184.8 LBS | DIASTOLIC BLOOD PRESSURE: 80 MMHG | SYSTOLIC BLOOD PRESSURE: 122 MMHG | HEART RATE: 70 BPM | HEIGHT: 66 IN | RESPIRATION RATE: 18 BRPM

## 2024-05-06 DIAGNOSIS — H00.012 HORDEOLUM EXTERNUM OF RIGHT LOWER EYELID: ICD-10-CM

## 2024-05-06 DIAGNOSIS — E11.29 TYPE 2 DIABETES MELLITUS WITH MICROALBUMINURIA, WITHOUT LONG-TERM CURRENT USE OF INSULIN (HCC): Primary | ICD-10-CM

## 2024-05-06 DIAGNOSIS — E55.9 VITAMIN D DEFICIENCY: ICD-10-CM

## 2024-05-06 DIAGNOSIS — R80.9 TYPE 2 DIABETES MELLITUS WITH MICROALBUMINURIA, WITHOUT LONG-TERM CURRENT USE OF INSULIN (HCC): Primary | ICD-10-CM

## 2024-05-06 PROCEDURE — G2211 COMPLEX E/M VISIT ADD ON: HCPCS | Performed by: FAMILY MEDICINE

## 2024-05-06 PROCEDURE — 99213 OFFICE O/P EST LOW 20 MIN: CPT | Performed by: FAMILY MEDICINE

## 2024-05-06 NOTE — PATIENT INSTRUCTIONS
A1c 6.7 from 6.3  Patient will concentrate on fruit instead of candy and bread    Vitamin D deficiency  Will take vitamin D every day instead of every other day    Elevated white count  Secondary to eye infection which is on its way out    See you on October 28

## 2024-05-06 NOTE — PROGRESS NOTES
"Name: Oma Cota      : 1945      MRN: [id number]  Encounter Provider: Ana Sommer DO  Encounter Date: 2024   Encounter department: [facility]    Assessment & Plan     A1c 6.7 from 6.3  Patient will concentrate on fruit instead of candy and bread    Vitamin D deficiency  Will take vitamin D every day instead of every other day    Elevated white count  Secondary to eye infection which is on its way up       Subjective      It is here at my request because of multiple lab abnormalities and his labs were not done before his last visit      Review of Systems    Current Outpatient Medications on File Prior to Visit   Medication Sig    albuterol (PROVENTIL HFA,VENTOLIN HFA) 90 mcg/act inhaler Inhale 2 puffs every 6 (six) hours as needed for wheezing or shortness of breath    Alpha-Lipoic Acid 600 MG CAPS Take by mouth    aspirin 81 mg chewable tablet Chew 1 tablet daily    Cholecalciferol (VITAMIN D3) 400 units CAPS Take by mouth    fluticasone (FLONASE) 50 mcg/act nasal spray 1 spray into each nostril daily    glucose blood (Randy Contour Next Test) test strip 1 each by Other route as needed (Use once daily)    hydrochlorothiazide (HYDRODIURIL) 25 mg tablet Take 0.5 tablets (12.5 mg total) by mouth daily    lisinopril (ZESTRIL) 40 mg tablet Take 1 tablet (40 mg total) by mouth daily    MICROLET LANCETS MISC Test sugars twice a day    multivitamin-minerals (CENTRUM ADULTS) tablet Take 1 tablet by mouth daily    ofloxacin (OCUFLOX) 0.3 % ophthalmic solution INSTILL 1 DROP TO OPERATIVE EYE 4 TIMES A DAY AS DIRECTED    omeprazole (PriLOSEC) 40 MG capsule TAKE 1 CAPSULE BY MOUTH DAILY    prednisoLONE acetate (PRED FORTE) 1 % ophthalmic suspension INSTILL 1 DROP TO OPERATIVE EYE 4 TIMES A DAY AS DIRECTED    simvastatin (ZOCOR) 5 MG tablet TAKE 1 TABLET BY MOUTH  DAILY AT BEDTIME       Objective     /80   Pulse 70   Resp 18   Ht 5' 6\" (1.676 m)   Wt 83.8 kg " (184 lb 12.8 oz)   SpO2 97%   BMI 29.83 kg/m²     Physical Exam  General  Patient in no acute distress, well appearing, well nourished and appears stated age    Mental status  Good judgment and insight, oriented to time person and place, recent and remote memory is intact, mood and affect are normal, cooperative, and patient is reasonable.  Ana Sommer, DO

## 2024-05-16 ENCOUNTER — EVALUATION (OUTPATIENT)
Dept: PHYSICAL THERAPY | Facility: CLINIC | Age: 79
End: 2024-05-16
Payer: MEDICARE

## 2024-05-16 DIAGNOSIS — M25.552 PAIN OF LEFT HIP: ICD-10-CM

## 2024-05-16 PROCEDURE — 97162 PT EVAL MOD COMPLEX 30 MIN: CPT | Performed by: PHYSICAL THERAPIST

## 2024-05-16 PROCEDURE — 97110 THERAPEUTIC EXERCISES: CPT | Performed by: PHYSICAL THERAPIST

## 2024-05-16 NOTE — PROGRESS NOTES
PT Evaluation     Today's date: 2024  Patient name: Oma Cota  : 1945  MRN: [id number]  Referring provider: Ana Sommer DO  Dx:   Encounter Diagnosis     ICD-10-CM    1. Pain of left hip  M25.552 Ambulatory referral to Physical Therapy                     Assessment  Impairments: abnormal gait, abnormal or restricted ROM, activity intolerance, impaired balance, impaired physical strength, lacks appropriate home exercise program and pain with function  Symptom irritability: low    Assessment details: Oma Cota is a 78 y.o. male presenting to outpatient physical therapy with chief complaints of strength deficits in the (L) hip. Patient describes insidious onset of (L) hip pain several weeks ago with resolution of pain but persistent hip weakness. Patient displays with abnormal gait, (B) Hip weakness, (+) NELLIE on (L), and functional restrictions.  Patient's symptoms are multifactorial in nature with a primary movement diagnosis of poor hip motor control resulting in pathoanatomical signs and symptoms consistent with Pain of left hip resulting in limitations in his ability to squat and negotiate steps as well as care for his wife.  No further referral appears necessary at this time based upon examination results.    Please contact me if you have any questions ([phone]). Thank you for the opportunity to share in the care of this patient.        Understanding of Dx/Px/POC: good     Prognosis: good  Prognosis details: Positive prognostic indicators include positive attitude toward recovery, motivated to improve, good understanding of condition, realistic expectations.      Goals  STG to be met in 4 weeks:  - Increase (B) Hip strength by 1/2 MMT grade.  - Improve SL balance to 15 seconds.  - I with HEP.  - Patient will be able to squat and go up steps without pain.   LTG to be met in 8 weeks:  - Increase (B) strength to 5/5 all planes.  - Improve SL balance to 30 seconds.   - I with updated  HEP.  - Patient will be able to care for his wife without restrictions.      Plan  Patient would benefit from: skilled physical therapy  Planned modality interventions: cryotherapy and thermotherapy: hydrocollator packs    Planned therapy interventions: activity modification, joint mobilization, manual therapy, balance, neuromuscular re-education, body mechanics training, patient education, postural training, strengthening, stretching, therapeutic activities, therapeutic exercise, functional ROM exercises, home exercise program and gait training    Plan of Care beginning date: 2024  Plan of Care expiration date: 2024  Treatment plan discussed with: patient  Plan details: Prognosis above is given PT services 2x/week tapering to 1x/week over the next 8 weeks and home program adherence.           Subjective Evaluation    History of Present Illness  Mechanism of injury: At Evaluation (May 16, 2024): Patient reports insidious onset of (L) hip and lower back pain about a month ago.  He reports waking up with the pain and had significant difficulty moving.  He contacted his PCP - recommended PT.  Since that time his pain has mostly subsided but his function remains limited.      Red Flag Screen:  Patient denies recent fever, changes in bowel and bladder function, nausea and vomiting, weakness, unexplained weight loss, pain with coughing, or traumatic KERI.  Patient reports neuropathy in both feet.      Greatest concern: not having strength to help his wife if needed    Quality of life: good    Patient Goals  Patient goal: Patient Specific Functional Scale: return to walking over 2 miles without increased pain 0/10, return to going up and down steps without pain 0/10, care for his wife without difficulty 0/10; Total 0/30  Pain  Current pain ratin  At best pain ratin  At worst pain ratin  Location: (L) lateral hip  Quality: sharp  Alleviating factors: Activity modification.  Exacerbated by: Squatting,  ascending steps, walking longer distances.    Social Support  Steps to enter house: yes  Stairs in house: yes   Lives in: multiple-level home  Lives with: significant other    Employment status: not working    Diagnostic Tests  X-ray: normal  Treatments  No previous or current treatments        Objective     Static Posture   General Observations  Symmetrical weight bearing.     Knee   Genu varus.     Palpation     Additional Palpation Details  Minimal TTP throughout (L) Hip    Lumbar Screen  Lumbar range of motion within normal limits.    Active Range of Motion   Left Hip   Flexion: WFL  Extension: 5 degrees   Abduction: 25 degrees   External rotation (90/90): WFL  Internal rotation (90/90): 10 degrees     Right Hip   Flexion: WFL  Extension: 5 degrees   Abduction: 25 degrees   External rotation (90/90): WFL  Internal rotation (90/90): 15 degrees     Strength/Myotome Testing     Left Hip   Planes of Motion   Flexion: 4-  Extension: 3+  Abduction: 3+    Right Hip   Planes of Motion   Flexion: 4  Extension: 4-  Abduction: 4    Left Knee   Flexion: 5  Extension: 5    Right Knee   Flexion: 4+  Extension: 4    Tests     Left Hip   Positive NELLIE.   Negative FADIR and scour.     Right Hip   Negative NELLIE, FADIR and scour.     Ambulation     Observational Gait   Gait: antalgic   Decreased walking speed, right stance time, left step length and right step length.     Functional Assessment        Comments  Squat: good depth - difficulty returning to standing.               Daily Treatment Diary     DX: (L) Hip Pain  EPOC: 7/11/24  Follow Up with Referring Provider:   Precautions:   CO-MORBIDITIES: HTN, DM II,   HEP ACCESS CODE: [id number]     Stage: subacute  Etiology: unknown  Stability of Symptoms:  At Evaluation: stable - unchanged  Global Rating of Change:   Symptom Irritability Level: low  Primary Movement Diagnosis: poor motor control   Primary Pain Phenotype: nociceptive  Patient Specific Functional Scale:   5/16/24:  return to walking over 2 miles without increased pain 0/10, return to going up and down steps without pain 0/10, care for his wife without difficulty 0/10; Total 0/30   Patient Acceptable Symptom State: unacceptable   FOTO Prediction: 66 by visit 12  FOTO Progress: 51 at evaluation   Greatest Concern: not having strength to help his wife if needed  Current Activity Plan: added to HEP (5/16)  Current Educational Needs: progressions       Treatment Diary          Manual Therapy                                                                        Therapeutic Exercise  HEP         Recumbent Bike                    SLR 5/16         S/L Hip ABD 5/16         Bridge 5/16         PHE 5/16                   Knee Ext Machine                    Leg Press                    Neuromuscular Reeducation          TB 4 Way Kicks                    FWD Mini Lunge          LAT Mini Lunge          Mini Squat                    FWD Step Up          LAT Step Up                                        Therapeutic Activity                              Gait Training                                        Modalities

## 2024-05-23 ENCOUNTER — OFFICE VISIT (OUTPATIENT)
Dept: PHYSICAL THERAPY | Facility: CLINIC | Age: 79
End: 2024-05-23
Payer: MEDICARE

## 2024-05-23 DIAGNOSIS — M25.552 PAIN OF LEFT HIP: Primary | ICD-10-CM

## 2024-05-23 PROCEDURE — 97112 NEUROMUSCULAR REEDUCATION: CPT | Performed by: PHYSICAL THERAPIST

## 2024-05-23 PROCEDURE — 97110 THERAPEUTIC EXERCISES: CPT | Performed by: PHYSICAL THERAPIST

## 2024-05-23 NOTE — PROGRESS NOTES
Daily Note     Today's date: 2024  Patient name: Oma Cota  : 1945  MRN: [id number]  Referring provider: Ana Sommer DO  Dx:   Encounter Diagnosis     ICD-10-CM    1. Pain of left hip  M25.552                      Subjective: Patient reports good tolerance to his IE and initial HEP.  He notes slight discomfort with bridge in the lower back but no lasting pain.        Objective: See treatment diary below      Assessment:   Stage: subacute  Etiology: unknown  Stability of Symptoms:  At Evaluation: stable - unchanged  Global Rating of Change:   Symptom Irritability Level: low  Primary Movement Diagnosis: poor motor control   Primary Pain Phenotype: nociceptive  Patient Specific Functional Scale:   24: return to walking over 2 miles without increased pain 0/10, return to going up and down steps without pain 0/10, care for his wife without difficulty 0/10; Total 0/30   Patient Acceptable Symptom State: unacceptable   FOTO Prediction: 66 by visit 12  FOTO Progress: 51 at evaluation   Greatest Concern: not having strength to help his wife if needed  Current Activity Plan: added to HEP ()  Current Educational Needs: progressions     Patient had good form with previously issued HEP.  He noted no increased pain throughout treatment.  He was challenged with weightbearing and machine exercises.  He noted no reproduction of pain post treatment.  Skilled PT continues to be required to guide progression of control and strength to allow him to return to walking longer distances and caring for his wife without difficulty.        Plan: Continue with POC - monitor tolerance to treatment - progress as able NV       Daily Treatment Diary     DX: (L) Hip Pain  EPOC: 24  Follow Up with Referring Provider:   Precautions:   CO-MORBIDITIES: HTN, DM II,   HEP ACCESS CODE: [id number]           Treatment Diary          Manual Therapy                                                                       "  Therapeutic Exercise  HEP         Recumbent Bike  6 min                  SLR 5/16 15x ea        S/L Hip ABD 5/16 15x ea        Bridge 5/16 5\"x15        PHE 5/16 15x ea                  Knee Ext Machine                    Leg Press  125#  30x                  Neuromuscular Reeducation          TB 4 Way Kicks  BTB  10x ea                  FWD Mini Lunge  15x ea        LAT Mini Lunge  15x ea        Mini Squat  15x                  FWD Step Up          LAT Step Up                                        Therapeutic Activity                              Gait Training                                        Modalities                                      "

## 2024-05-30 ENCOUNTER — OFFICE VISIT (OUTPATIENT)
Dept: PHYSICAL THERAPY | Facility: CLINIC | Age: 79
End: 2024-05-30
Payer: MEDICARE

## 2024-05-30 DIAGNOSIS — M25.552 PAIN OF LEFT HIP: Primary | ICD-10-CM

## 2024-05-30 PROCEDURE — 97110 THERAPEUTIC EXERCISES: CPT

## 2024-05-30 NOTE — PROGRESS NOTES
"Daily Note     Today's date: 2024  Patient name: Oma Cota  : 1945  MRN: [id number]  Referring provider: Ana Sommer DO  Dx:   Encounter Diagnosis     ICD-10-CM    1. Pain of left hip  M25.552                      Subjective: Pt reports feeling the same, no changes at this time.       Objective: See treatment diary below      Assessment: Tolerated treatment well. Patient exhibited good technique with therapeutic exercises      Plan: Continue per plan of care.      Daily Treatment Diary     DX: (L) Hip Pain  EPOC: 24  Follow Up with Referring Provider:   Precautions:   CO-MORBIDITIES: HTN, DM II,   HEP ACCESS CODE: [id number]           Treatment Diary         Manual Therapy                                                                        Therapeutic Exercise  HEP         Recumbent Bike  6 min 6 min                 SLR  15x ea 15x ea       S/L Hip ABD  15x ea 15x ea       Bridge  5\"x15 5\"x15       PHE  15x ea 15x ea                 Knee Ext Machine                    Leg Press  125#  30x 135# 30x                 Neuromuscular Reeducation          TB 4 Way Kicks  BTB  10x ea BTB 15x ea                 FWD Mini Lunge  15x ea 15x ea       LAT Mini Lunge  15x ea 15x ea       Mini Squat  15x 20x                 FWD Step Up          LAT Step Up                                        Therapeutic Activity                              Gait Training                                        Modalities                                        "

## 2024-05-31 ENCOUNTER — OFFICE VISIT (OUTPATIENT)
Dept: OBGYN CLINIC | Facility: CLINIC | Age: 79
End: 2024-05-31
Payer: MEDICARE

## 2024-05-31 VITALS
HEART RATE: 80 BPM | WEIGHT: 183 LBS | BODY MASS INDEX: 29.41 KG/M2 | HEIGHT: 66 IN | SYSTOLIC BLOOD PRESSURE: 135 MMHG | DIASTOLIC BLOOD PRESSURE: 74 MMHG

## 2024-05-31 DIAGNOSIS — M17.11 PRIMARY OSTEOARTHRITIS OF RIGHT KNEE: Primary | ICD-10-CM

## 2024-05-31 PROCEDURE — 99214 OFFICE O/P EST MOD 30 MIN: CPT | Performed by: ORTHOPAEDIC SURGERY

## 2024-05-31 PROCEDURE — 20610 DRAIN/INJ JOINT/BURSA W/O US: CPT | Performed by: ORTHOPAEDIC SURGERY

## 2024-05-31 RX ADMIN — BUPIVACAINE HYDROCHLORIDE 1 ML: 2.5 INJECTION, SOLUTION INFILTRATION; PERINEURAL at 11:45

## 2024-05-31 RX ADMIN — KETOROLAC TROMETHAMINE 30 MG: 30 INJECTION, SOLUTION INTRAMUSCULAR; INTRAVENOUS at 11:45

## 2024-05-31 RX ADMIN — METHYLPREDNISOLONE ACETATE 2 ML: 40 INJECTION, SUSPENSION INTRA-ARTICULAR; INTRALESIONAL; INTRAMUSCULAR; SOFT TISSUE at 11:45

## 2024-05-31 NOTE — PROGRESS NOTES
Assessment:   Diagnosis ICD-10-CM Associated Orders   1. Primary osteoarthritis of right knee  M17.11           Plan:  On exam patient maintains full range of motion with most of his pain located on the medial aspect of the knee.  A cortisone injection was provided for the patient today, which she tolerated well.  Today we switched back to Depo-Medrol, where his previous injection was Kenalog.  We will reevaluate how the injections are performing for the patient on his next visit in 3 months.  We will hold off on obtaining radiographs as Mr Cota will be treated conservatively moving forward.      To do next visit:  Return in about 3 months (around 8/31/2024) for right knee.    The above stated was discussed in layman's terms and the patient expressed understanding.  All questions were answered to the patient's satisfaction.       Scribe Attestation      I,:  Joan Dey am acting as a scribe while in the presence of the attending physician.:       I,:  Theresa Hernandez MD personally performed the services described in this documentation    as scribed in my presence.:               Subjective:   Oma Cota is a 78 y.o. male who presents today for 3-month follow-up for his right knee pain due to osteoarthritis.  He has been treating nonoperatively with cortisone injections, last injection was on 3/1/2024. He reports that the injection gives him about 2 1/2 months of relief.       Review of systems negative unless otherwise specified in HPI  Review of Systems   Constitutional:  Negative for chills, fever and unexpected weight change.   HENT:  Negative for hearing loss, nosebleeds and sore throat.    Eyes:  Negative for pain, redness and visual disturbance.   Respiratory:  Negative for cough, shortness of breath and wheezing.    Cardiovascular:  Negative for chest pain, palpitations and leg swelling.   Gastrointestinal:  Negative for abdominal pain, nausea and vomiting.   Endocrine: Negative for polydipsia and  polyuria.   Genitourinary:  Negative for dysuria and hematuria.   Musculoskeletal:  Positive for arthralgias.   Skin:  Negative for rash and wound.   Neurological:  Negative for dizziness, light-headedness and headaches.   Psychiatric/Behavioral:  Negative for decreased concentration, dysphoric mood and suicidal ideas. The patient is not nervous/anxious.        Past Medical History:   Diagnosis Date    Abnormal glucose     last assessed [id number]    Anemia     last assessed [id number]    Benign neoplasm of descending colon 2009    Chondromalacia of patella 2011    unspecified laterality    Colon polyp     Diabetes mellitus (HCC)     Diverticulitis of colon 2011    Ganglion     last assessed [id number]    Gastroenteritis     last assessed [id number]    Glucose intolerance     last assessed [id number]    Hyperesthesia 2010    Hyperlipidemia     Hypertension     Rectal hemorrhage 2010       Past Surgical History:   Procedure Laterality Date    ADENOIDECTOMY  1950    INCISIONAL BREAST BIOPSY Left 1980    L breast did bx     NECK SURGERY  1950    neck gland removed as child     TONSILLECTOMY  1950       Family History   Problem Relation Age of Onset    Alzheimer's disease Mother     Heart attack Father     Alcohol abuse Neg Hx     Substance Abuse Neg Hx        Social History     Occupational History    Not on file   Tobacco Use    Smoking status: Former     Current packs/day: 0.00     Types: Cigarettes     Quit date:      Years since quittin.4    Smokeless tobacco: Never    Tobacco comments:     quit in [id number]   Vaping Use    Vaping status: Never Used   Substance and Sexual Activity    Alcohol use: Yes     Comment: social    Drug use: No    Sexual activity: Not on file         Current Outpatient Medications:     albuterol (PROVENTIL HFA,VENTOLIN HFA) 90 mcg/act inhaler, Inhale 2 puffs every 6 (six) hours as needed for wheezing or  shortness of breath, Disp: 8 g, Rfl: 1    Alpha-Lipoic Acid 600 MG CAPS, Take by mouth, Disp: , Rfl:     aspirin 81 mg chewable tablet, Chew 1 tablet daily, Disp: , Rfl:     Cholecalciferol (VITAMIN D3) 400 units CAPS, Take by mouth, Disp: , Rfl:     fluticasone (FLONASE) 50 mcg/act nasal spray, 1 spray into each nostril daily, Disp: 9.9 mL, Rfl: 1    glucose blood (Randy Contour Next Test) test strip, 1 each by Other route as needed (Use once daily), Disp: 100 each, Rfl: 3    hydrochlorothiazide (HYDRODIURIL) 25 mg tablet, Take 0.5 tablets (12.5 mg total) by mouth daily, Disp: 45 tablet, Rfl: 3    lisinopril (ZESTRIL) 40 mg tablet, Take 1 tablet (40 mg total) by mouth daily, Disp: 90 tablet, Rfl: 1    MICROLET LANCETS MISC, Test sugars twice a day, Disp: 100 each, Rfl: 3    multivitamin-minerals (CENTRUM ADULTS) tablet, Take 1 tablet by mouth daily, Disp: , Rfl:     ofloxacin (OCUFLOX) 0.3 % ophthalmic solution, INSTILL 1 DROP TO OPERATIVE EYE 4 TIMES A DAY AS DIRECTED, Disp: , Rfl:     omeprazole (PriLOSEC) 40 MG capsule, TAKE 1 CAPSULE BY MOUTH DAILY, Disp: 90 capsule, Rfl: 3    prednisoLONE acetate (PRED FORTE) 1 % ophthalmic suspension, INSTILL 1 DROP TO OPERATIVE EYE 4 TIMES A DAY AS DIRECTED, Disp: , Rfl:     simvastatin (ZOCOR) 5 MG tablet, TAKE 1 TABLET BY MOUTH  DAILY AT BEDTIME, Disp: 90 tablet, Rfl: 3    Allergies   Allergen Reactions    Iodine - Food Allergy      Other reaction(s): was told by nurse: heat rash?            Vitals:    05/31/24 1221   BP: 135/74   Pulse: 80       Body mass index is 29.54 kg/m².  Wt Readings from Last 3 Encounters:   05/31/24 83 kg (183 lb)   05/06/24 83.8 kg (184 lb 12.8 oz)   04/23/24 84.1 kg (185 lb 6.4 oz)       Objective:                    Right Knee Exam     Tenderness   The patient is experiencing tenderness in the medial joint line.    Range of Motion   Extension:  0   Flexion:  120     Tests   Varus: negative Valgus: negative  Drawer:  Anterior - negative     "Posterior - negative  Pivot shift: negative    Other   Erythema: absent  Sensation: normal  Pulse: present  Swelling: none  Effusion: no effusion present    Comments:  Varus alignment   Calf is soft and non tender            Diagnostics, reviewed and taken today if performed as documented:      The attending physician has personally reviewed the pertinent films in PACS and interpretation is as follows:  X-rays of the right knee 3 views reviewed from 6/1/2020: Mild to moderate osteoarthritis in the medial compartment    Procedures, if performed today:    Large joint arthrocentesis: R knee  Universal Protocol:  Consent: Verbal consent obtained.  Risks and benefits: risks, benefits and alternatives were discussed  Consent given by: patient  Time out: Immediately prior to procedure a \"time out\" was called to verify the correct patient, procedure, equipment, support staff and site/side marked as required.  Timeout called at: 5/31/2024 12:45 PM.  Patient understanding: patient states understanding of the procedure being performed  Site marked: the operative site was marked  Patient identity confirmed: verbally with patient  Supporting Documentation  Indications: pain   Procedure Details  Location: knee - R knee  Preparation: Patient was prepped and draped in the usual sterile fashion  Needle size: 22 G  Ultrasound guidance: no  Approach: anterolateral  Medications administered: 2 mL methylPREDNISolone acetate 40 mg/mL; 1 mL bupivacaine 0.25 %; 30 mg ketorolac 30 mg/mL    Patient tolerance: patient tolerated the procedure well with no immediate complications  Dressing:  Sterile dressing applied            Portions of the record may have been created with voice recognition software.  Occasional wrong word or \"sound a like\" substitutions may have occurred due to the inherent limitations of voice recognition software.  Read the chart carefully and recognize, using context, where substitutions have occurred.  "

## 2024-06-01 RX ORDER — METHYLPREDNISOLONE ACETATE 40 MG/ML
2 INJECTION, SUSPENSION INTRA-ARTICULAR; INTRALESIONAL; INTRAMUSCULAR; SOFT TISSUE
Status: COMPLETED | OUTPATIENT
Start: 2024-05-31 | End: 2024-05-31

## 2024-06-01 RX ORDER — BUPIVACAINE HYDROCHLORIDE 2.5 MG/ML
1 INJECTION, SOLUTION INFILTRATION; PERINEURAL
Status: COMPLETED | OUTPATIENT
Start: 2024-05-31 | End: 2024-05-31

## 2024-06-01 RX ORDER — KETOROLAC TROMETHAMINE 30 MG/ML
30 INJECTION, SOLUTION INTRAMUSCULAR; INTRAVENOUS
Status: COMPLETED | OUTPATIENT
Start: 2024-05-31 | End: 2024-05-31

## 2024-06-06 ENCOUNTER — OFFICE VISIT (OUTPATIENT)
Dept: PHYSICAL THERAPY | Facility: CLINIC | Age: 79
End: 2024-06-06
Payer: MEDICARE

## 2024-06-06 DIAGNOSIS — M25.552 PAIN OF LEFT HIP: Primary | ICD-10-CM

## 2024-06-06 PROCEDURE — 97110 THERAPEUTIC EXERCISES: CPT

## 2024-06-06 PROCEDURE — 97112 NEUROMUSCULAR REEDUCATION: CPT

## 2024-06-06 NOTE — PROGRESS NOTES
"Daily Note     Today's date: 2024  Patient name: Oma Cota  : 1945  MRN: [id number]  Referring provider: Ana Sommer DO  Dx:   Encounter Diagnosis     ICD-10-CM    1. Pain of left hip  M25.552                      Subjective: Pt reports concerned about his R knee that was just injected.  \"I don't want to mess that up\".      Objective: See treatment diary below      Assessment: Tolerated treatment well w/ progression of ex's.  Patient exhibited good technique with therapeutic exercises and would benefit from continued PT to work on LE strengthening.  Pt's R knee pain monitored t/o session to prevent increased pain.        Plan: Continue per plan of care.  Progress treatment as tolerated.       Daily Treatment Diary     DX: (L) Hip Pain  EPOC: 24  Follow Up with Referring Provider:   Precautions:   CO-MORBIDITIES: HTN, DM II,   HEP ACCESS CODE: [id number]           Treatment Diary   6/6      Manual Therapy                                                                        Therapeutic Exercise  HEP         Recumbent Bike  6 min 6 min 6 min                SLR  15x ea 15x ea 20x ea      S/L Hip ABD  15x ea 15x ea 20x ea      Bridge  5\"x15 5\"x15 5\"x20      PHE  15x ea 15x ea 20x ea                Knee Ext Machine    50# 10x2                Leg Press  125#  30x 135# 30x 135# 30x                Neuromuscular Reeducation          TB 4 Way Kicks  BTB  10x ea BTB 15x ea BTB 15x ea                FWD Mini Lunge  15x ea 15x ea 15x ea      LAT Mini Lunge  15x ea 15x ea 15x ea      Mini Squat  15x 20x 20x                FWD Step Up          LAT Step Up    Dip 6\" x10 BL UE behind on rail                                    Therapeutic Activity                              Gait Training                                        Modalities                                          "

## 2024-06-13 ENCOUNTER — OFFICE VISIT (OUTPATIENT)
Dept: PHYSICAL THERAPY | Facility: CLINIC | Age: 79
End: 2024-06-13
Payer: MEDICARE

## 2024-06-13 DIAGNOSIS — M25.552 PAIN OF LEFT HIP: Primary | ICD-10-CM

## 2024-06-13 PROCEDURE — 97112 NEUROMUSCULAR REEDUCATION: CPT | Performed by: PHYSICAL THERAPY ASSISTANT

## 2024-06-13 PROCEDURE — 97110 THERAPEUTIC EXERCISES: CPT | Performed by: PHYSICAL THERAPY ASSISTANT

## 2024-06-13 NOTE — PROGRESS NOTES
"Daily Note     Today's date: 2024  Patient name: Oma Cota  : 1945  MRN: [id number]  Referring provider: Ana Sommer DO  Dx:   Encounter Diagnosis     ICD-10-CM    1. Pain of left hip  M25.552                      Subjective: Pt is without complaints.     Objective: See treatment diary below      Assessment: Tolerated treatment well .  Patient exhibited good technique with therapeutic exercises and would benefit from continued PT to work on LE strengthening.  No complaints of pain with activity this session.     Plan: Continue per plan of care.  Progress treatment as tolerated.       Daily Treatment Diary     DX: (L) Hip Pain  EPOC: 24  Follow Up with Referring Provider:   Precautions:   CO-MORBIDITIES: HTN, DM II,   HEP ACCESS CODE: [id number]           Treatment Diary       Manual Therapy                                                                        Therapeutic Exercise  HEP         Recumbent Bike  6 min 6 min 6 min 6 min               SLR  15x ea 15x ea 20x ea 20x ea     S/L Hip ABD  15x ea 15x ea 20x ea 20x ea     Bridge  5\"x15 5\"x15 5\"x20 5\"x20     PHE  15x ea 15x ea 20x ea 20x ea               Knee Ext Machine    50# 10x2 50# 10x2               Leg Press  125#  30x 135# 30x 135# 30x 135# x30               Neuromuscular Reeducation          TB 4 Way Kicks  BTB  10x ea BTB 15x ea BTB 15x ea BTB x15 ea               FWD Mini Lunge  15x ea 15x ea 15x ea 15x ea     LAT Mini Lunge  15x ea 15x ea 15x ea 15x ea     Mini Squat  15x 20x 20x x20               FWD Step Up          LAT Step Up    Dip 6\" x10 BL UE behind on rail Dip 6\" x15 BL UE behind on saenz                                   Therapeutic Activity                              Gait Training                                        Modalities                                          "

## 2024-06-20 ENCOUNTER — OFFICE VISIT (OUTPATIENT)
Dept: PHYSICAL THERAPY | Facility: CLINIC | Age: 79
End: 2024-06-20
Payer: MEDICARE

## 2024-06-20 DIAGNOSIS — M25.552 PAIN OF LEFT HIP: Primary | ICD-10-CM

## 2024-06-20 PROCEDURE — 97112 NEUROMUSCULAR REEDUCATION: CPT

## 2024-06-20 PROCEDURE — 97110 THERAPEUTIC EXERCISES: CPT

## 2024-06-20 NOTE — PROGRESS NOTES
"Daily Note     Today's date: 2024  Patient name: Oma Cota  : 1945  MRN: [id number]  Referring provider: Ana Sommer DO  Dx:   Encounter Diagnosis     ICD-10-CM    1. Pain of left hip  M25.552           Start Time: 1115  Stop Time: 1205  Total time in clinic (min): 50 minutes    Subjective: Patient reports that he has continued soreness into left hip but low level. No significant changes to pain levels reported since beginning PT.       Objective: See treatment diary below      Assessment: Increased resistance with Leg Press and increased reps where indicated below. Patient appeared to tolerate treatment well but with appropriate muscular fatigue and challenge. Patient demonstrated fatigue post treatment, exhibited good technique with therapeutic exercises, and would benefit from continued PT      Plan: Continue per plan of care.  Progress treatment as tolerated.       Daily Treatment Diary     DX: (L) Hip Pain  EPOC: 24  Follow Up with Referring Provider:   Precautions:   CO-MORBIDITIES: HTN, DM II,   HEP ACCESS CODE: [id number]           Treatment Diary      Manual Therapy                                                                        Therapeutic Exercise  HEP         Recumbent Bike  6 min 6 min 6 min 6 min L2 6min              SLR  15x ea 15x ea 20x ea 20x ea X25 ea    S/L Hip ABD  15x ea 15x ea 20x ea 20x ea X25 ea    Bridge  5\"x15 5\"x15 5\"x20 5\"x20 5\" x20    PHE  15x ea 15x ea 20x ea 20x ea               Knee Ext Machine    50# 10x2 50# 10x2 50# 3x10              Leg Press  125#  30x 135# 30x 135# 30x 135# x30 145# X30              Neuromuscular Reeducation          TB 4 Way Kicks  BTB  10x ea BTB 15x ea BTB 15x ea BTB x15 ea BTB x20 ea              FWD Mini Lunge  15x ea 15x ea 15x ea 15x ea X15 ea    LAT Mini Lunge  15x ea 15x ea 15x ea 15x ea X15 ea    Mini Squat  15x 20x 20x x20 x20              FWD Step Up          LAT Step Up    Dip 6\" " "x10 BL UE behind on rail Dip 6\" x15 BL UE behind on saenz NV                                  Therapeutic Activity                              Gait Training                                        Modalities                                            "

## 2024-06-27 ENCOUNTER — OFFICE VISIT (OUTPATIENT)
Dept: PHYSICAL THERAPY | Facility: CLINIC | Age: 79
End: 2024-06-27
Payer: MEDICARE

## 2024-06-27 DIAGNOSIS — M25.552 PAIN OF LEFT HIP: Primary | ICD-10-CM

## 2024-06-27 DIAGNOSIS — E78.2 MIXED HYPERLIPIDEMIA: ICD-10-CM

## 2024-06-27 PROCEDURE — 97110 THERAPEUTIC EXERCISES: CPT

## 2024-06-27 PROCEDURE — 97112 NEUROMUSCULAR REEDUCATION: CPT

## 2024-06-27 NOTE — PROGRESS NOTES
"Daily Note     Today's date: 2024  Patient name: Oma Cota  : 1945  MRN: [id number]  Referring provider: Ana Sommer DO  Dx:   Encounter Diagnosis     ICD-10-CM    1. Pain of left hip  M25.552           Start Time: 1355  Stop Time: 1440  Total time in clinic (min): 45 minutes    Subjective: Patient reports that Left hip feels good and he denies any complaints of pain presently. However, he reports that he has increased pain into right knee that has progressively gotten worse.       Objective: See treatment diary below      Assessment: Decreased intensity of program this session due to increased subjective complaints. Added sidestepping and 1# cuff weight to LLE for SLR as indicated for hip strengthening. Patient appeared to tolerate treatment well. No complaints of right knee discomfort during session. Patient demonstrated fatigue post treatment, exhibited good technique with therapeutic exercises, and would benefit from continued PT      Plan: Continue per plan of care.  Progress treatment as tolerated.       Daily Treatment Diary     DX: (L) Hip Pain  EPOC: 24  Follow Up with Referring Provider:   Precautions:   CO-MORBIDITIES: HTN, DM II,   HEP ACCESS CODE: [id number]           Treatment Diary     Manual Therapy                                                                        Therapeutic Exercise  HEP         Recumbent Bike  6 min 6 min 6 min 6 min L2 6min 6 min             SLR  15x ea 15x ea 20x ea 20x ea X25 ea L 1# 2x10   S/L Hip ABD  15x ea 15x ea 20x ea 20x ea X25 ea L 1# 2x10   Bridge 16 5\"x15 5\"x15 5\"x20 5\"x20 5\" x20    PHE  15x ea 15x ea 20x ea 20x ea  Sm ROM x15             Knee Ext Machine    50# 10x2 50# 10x2 50# 3x10 50# 2x10             Leg Press  125#  30x 135# 30x 135# 30x 135# x30 145# X30 145# 2x10   Sidestepping       Green line 2 laps   Neuromuscular Reeducation          TB 4 Way Kicks  BTB  10x ea BTB 15x ea BTB 15x ea BTB " "x15 ea BTB x20 ea BTB x20 ea F/E/Abd             FWD Mini Lunge  15x ea 15x ea 15x ea 15x ea X15 ea L only x20   LAT Mini Lunge  15x ea 15x ea 15x ea 15x ea X15 ea L only x20   Mini Squat  15x 20x 20x x20 x20              FWD Step Up          LAT Step Up    Dip 6\" x10 BL UE behind on rail Dip 6\" x15 BL UE behind on saenz NV NP                                 Therapeutic Activity                              Gait Training                                        Modalities                                              "

## 2024-06-28 RX ORDER — SIMVASTATIN 5 MG
TABLET ORAL
Qty: 90 TABLET | Refills: 1 | Status: SHIPPED | OUTPATIENT
Start: 2024-06-28

## 2024-07-05 ENCOUNTER — OFFICE VISIT (OUTPATIENT)
Dept: PHYSICAL THERAPY | Facility: CLINIC | Age: 79
End: 2024-07-05
Payer: MEDICARE

## 2024-07-05 DIAGNOSIS — M25.552 PAIN OF LEFT HIP: Primary | ICD-10-CM

## 2024-07-05 PROCEDURE — 97110 THERAPEUTIC EXERCISES: CPT | Performed by: PHYSICAL THERAPIST

## 2024-07-05 PROCEDURE — 97112 NEUROMUSCULAR REEDUCATION: CPT | Performed by: PHYSICAL THERAPIST

## 2024-07-05 NOTE — PROGRESS NOTES
PT Re-Evaluation     Today's date: 2024  Patient name: Oma Cota  : 1945  MRN: [id number]  Referring provider: Ana Sommer DO  Dx:   Encounter Diagnosis     ICD-10-CM    1. Pain of left hip  M25.552                      Assessment  Impairments: abnormal gait, abnormal or restricted ROM, activity intolerance, impaired balance, impaired physical strength, lacks appropriate home exercise program and pain with function  Symptom irritability: low    Assessment details: Patient has attended 8 PT treatment sessions from 24 to 24.  Since initial evaluation patient displays with objective improvements with pain levels, strength, ROM, and function.  Patient has achieved a 4 point increase on the Global Rating of Change scale.  Patient demonstrates a 25 point improvement on the Patient Specific Functional Scale and a 24 point improvement on FOTO indicating excellent progress towards his goals.  At this time it is recommended that he continue with an I HEP.  He is to contact me if he has return of symptoms or any questions/ concerns.      Please contact me if you have any questions ([phone]). Thank you for the opportunity to share in the care of this patient.            Understanding of Dx/Px/POC: good     Prognosis: good  Prognosis details: Positive prognostic indicators include positive attitude toward recovery, motivated to improve, good understanding of condition, realistic expectations.      Goals  STG to be met in 4 weeks:  - Increase (B) Hip strength by 1/2 MMT grade. - met  - Improve SL balance to 15 seconds. - met  - I with HEP. - met  - Patient will be able to squat and go up steps without pain. - met  LTG to be met in 8 weeks:  - Increase (B) strength to 5/5 all planes. - met  - Improve SL balance to 30 seconds.  - met  - I with updated HEP. - met  - Patient will be able to care for his wife without restrictions.  - met    Plan  Patient would benefit from: skilled physical  therapy  Planned modality interventions: cryotherapy and thermotherapy: hydrocollator packs    Planned therapy interventions: activity modification, joint mobilization, manual therapy, balance, neuromuscular re-education, body mechanics training, patient education, postural training, strengthening, stretching, therapeutic activities, therapeutic exercise, functional ROM exercises, home exercise program and gait training    Plan of Care beginning date: 5/16/2024  Plan of Care expiration date: 7/11/2024  Treatment plan discussed with: patient  Plan details: Prognosis above is given PT services 2x/week tapering to 1x/week over the next 8 weeks and home program adherence.         Subjective Evaluation    History of Present Illness  Mechanism of injury: At Evaluation (May 16, 2024): Patient reports insidious onset of (L) hip and lower back pain about a month ago.  He reports waking up with the pain and had significant difficulty moving.  He contacted his PCP - recommended PT.  Since that time his pain has mostly subsided but his function remains limited.      Red Flag Screen:  Patient denies recent fever, changes in bowel and bladder function, nausea and vomiting, weakness, unexplained weight loss, pain with coughing, or traumatic KERI.  Patient reports neuropathy in both feet.      Greatest concern: not having strength to help his wife if needed    (7/5/24): Patient reports since starting PT he sees some improvement in pain levels, strength, and function.  Global Rating of Change: +4.  Patient reports walking is much easier - even up hills.  Going up and down steps is getting easier as well but remains challenging. He notes no difficulty caring for his wife.    Quality of life: good    Patient Goals  Patient goal: Patient Specific Functional Scale: return to walking over 2 miles without increased pain 10/10, return to going up and down steps without pain 5/10, care for his wife without difficulty 10/10; Total  25/30  Pain  Current pain ratin  At best pain ratin  At worst pain ratin  Location: (L) lateral hip  Quality: sharp  Alleviating factors: Activity modification.  Exacerbated by: Squatting, ascending steps, walking longer distances.    Social Support  Steps to enter house: yes  Stairs in house: yes   Lives in: multiple-level home  Lives with: significant other    Employment status: not working    Diagnostic Tests  X-ray: normal  Treatments  No previous or current treatments      Objective     Static Posture   General Observations  Symmetrical weight bearing.     Knee   Genu varus.     Palpation     Additional Palpation Details  Minimal TTP throughout (L) Hip    Lumbar Screen  Lumbar range of motion within normal limits.    Active Range of Motion   Left Hip   Flexion: WFL  Extension: 5 degrees   Abduction: 25 degrees   External rotation (90/90): WFL  Internal rotation (90/90): 10 degrees     Right Hip   Flexion: WFL  Extension: 5 degrees   Abduction: 25 degrees   External rotation (90/90): WFL  Internal rotation (90/90): 15 degrees     Strength/Myotome Testing     Left Hip   Planes of Motion   Flexion: 4  Extension: 4  Abduction: 4    Right Hip   Planes of Motion   Flexion: 4+  Extension: 4  Abduction: 4    Left Knee   Flexion: 5  Extension: 5    Right Knee   Flexion: 5  Extension: 4+    Tests     Left Hip   Positive NELLIE.   Negative FADIR and scour.     Right Hip   Negative NELLIE, FADIR and scour.     Ambulation     Observational Gait   Gait: within functional limits   Walking speed, right stance time, left step length and right step length within functional limits.     Functional Assessment        Comments  Squat: good depth and control             Daily Treatment Diary     DX: (L) Hip Pain  EPOC: 24  Follow Up with Referring Provider:   Precautions:   CO-MORBIDITIES: HTN, DM II,   HEP ACCESS CODE: [id number]     Stage: subacute  Etiology: unknown  Stability of Symptoms:  At Evaluation: stable -  "unchanged  Global Rating of Change: +4  Symptom Irritability Level: low  Primary Movement Diagnosis: poor motor control   Primary Pain Phenotype: nociceptive  Patient Specific Functional Scale:   5/16/24: return to walking over 2 miles without increased pain 0/10, return to going up and down steps without pain 0/10, care for his wife without difficulty 0/10; Total 0/30 7/5/24: return to walking over 2 miles without increased pain 10/10, return to going up and down steps without pain 5/10, care for his wife without difficulty 10/10; Total 25/30   Patient Acceptable Symptom State: acceptable   FOTO Prediction: 66 by visit 12  FOTO Progress: 51 at evaluation   Greatest Concern: not having strength to help his wife if needed  Current Activity Plan: added to HEP (5/16)  Current Educational Needs: progressions      Treatment Diary  6/13 6/20 6/27 7/5     Manual Therapy                                                                        Therapeutic Exercise  HEP         Recumbent Bike  6 min L2 6min 6 min 6 min               SLR 5/16 20x ea X25 ea L 1# 2x10      S/L Hip ABD 5/16 20x ea X25 ea L 1# 2x10      Bridge 5/16 5\"x20 5\" x20       PHE 5/16 20x ea  Sm ROM x15                Knee Ext Machine  50# 10x2 50# 3x10 50# 2x10 50# 20x               Leg Press  135# x30 145# X30 145# 2x10 145#   20x     Sidestepping    Green line 2 laps      Neuromuscular Reeducation          TB 4 Way Kicks  BTB x15 ea BTB x20 ea BTB x20 ea F/E/Abd BTB  15x ea               FWD Mini Lunge  15x ea X15 ea L only x20      LAT Mini Lunge  15x ea X15 ea L only x20      Mini Squat  x20 x20                 FWD Step Up          LAT Step Up  Dip 6\" x15 BL UE behind on saenz NV NP                                    Therapeutic Activity                              Gait Training                                        Modalities                                        "

## 2024-08-06 ENCOUNTER — OFFICE VISIT (OUTPATIENT)
Dept: FAMILY MEDICINE CLINIC | Facility: CLINIC | Age: 79
End: 2024-08-06
Payer: MEDICARE

## 2024-08-06 VITALS
BODY MASS INDEX: 28.72 KG/M2 | RESPIRATION RATE: 18 BRPM | HEIGHT: 66 IN | TEMPERATURE: 98 F | OXYGEN SATURATION: 97 % | SYSTOLIC BLOOD PRESSURE: 140 MMHG | HEART RATE: 76 BPM | DIASTOLIC BLOOD PRESSURE: 80 MMHG | WEIGHT: 178.7 LBS

## 2024-08-06 DIAGNOSIS — M70.21 OLECRANON BURSITIS OF RIGHT ELBOW: Primary | ICD-10-CM

## 2024-08-06 PROBLEM — M65.331 TRIGGER MIDDLE FINGER OF RIGHT HAND: Status: RESOLVED | Noted: 2017-05-19 | Resolved: 2024-08-06

## 2024-08-06 PROBLEM — K14.6 TONGUE PAIN: Status: RESOLVED | Noted: 2024-04-23 | Resolved: 2024-08-06

## 2024-08-06 PROCEDURE — 99213 OFFICE O/P EST LOW 20 MIN: CPT | Performed by: FAMILY MEDICINE

## 2024-08-06 PROCEDURE — G2211 COMPLEX E/M VISIT ADD ON: HCPCS | Performed by: FAMILY MEDICINE

## 2024-08-06 NOTE — PATIENT INSTRUCTIONS
Called olecranon bursitis  Leave it alone try not to lean on it and it will go away    See you as scheduled or needed

## 2024-08-06 NOTE — PROGRESS NOTES
ASSESSMENT/PLAN:    Olecranon bursitis  Discussed pathophysiology  Leave it alone try not to lean on it and it will go away       Health Maintenance   Topic Date Due    SLP PLAN OF CARE  Never done    RSV Vaccine Age 60+ Years (1 - 1-dose 60+ series) Never done    IPV Vaccine (2 of 3 - Adult catch-up series) 09/25/2006    COVID-19 Vaccine (5 - 2023-24 season) 09/01/2023    PT PLAN OF CARE  06/15/2024    Influenza Vaccine (1) 09/01/2024    HEMOGLOBIN A1C  10/24/2024    Hepatitis C Screening  05/23/2026 (Originally 1945)    Depression Screening  04/23/2025    Medicare Annual Wellness Visit (AWV)  04/23/2025    Diabetic Foot Exam  04/23/2025    Kidney Health Evaluation: GFR  04/24/2025    Kidney Health Evaluation: Albumin/Creatinine Ratio  04/24/2025    Fall Risk  05/16/2025    DM Eye Exam  06/20/2025    Colorectal Cancer Screening  02/25/2029    Zoster Vaccine  Completed    Pneumococcal Vaccine: 65+ Years  Completed    RSV Vaccine age 0-20 Months  Aged Out    HIB Vaccine  Aged Out    Hepatitis A Vaccine  Aged Out    Meningococcal ACWY Vaccine  Aged Out    HPV Vaccine  Aged Out         Problem List as of 8/6/2024 Reviewed: 5/6/2024 11:08 AM by Ana Sommer,       Actinic keratosis    Adjustment insomnia    Allergic rhinitis    De Quervain's tenosynovitis, right    Diabetic polyneuropathy associated with type 2 diabetes mellitus (HCC)    Diverticulosis of colon    Ectatic abdominal aorta (HCC)    Elastosis of skin    Essential hypertension    Gastroesophageal reflux disease without esophagitis    Hearing loss    Hiatal hernia    Mixed hyperlipidemia    Myopia of both eyes    Osteoarthritis of right knee    Pain of left hip    Presbyopia    Primary open angle glaucoma (POAG) of both eyes, mild stage    Stage 3 chronic kidney disease, unspecified whether stage 3a or 3b CKD (HCC)    Tinnitus    Tongue pain    Trigger middle finger of right hand    Type 2 diabetes mellitus with microalbuminuria, without long-term  current use of insulin (HCC)    Vitamin D deficiency    Vitreous degeneration of both eyes         Subjective:   Chief Complaint   Patient presents with    Joint Swelling     Elbow, since Sunday afternoon     Here for egg on elbow after coming out of a meeting.  Patient does not recall any pain or banging it or any trauma to the area        patient ID: Oma Cota is a 78 y.o. male.    Patient's past medical history, surgical history, family history, social history, and Tobacco history reviewed with patient.     MED LIST WAS REVIEWED AND UPDATED    ROS  As per HPI  Rest of 12 point review of systems negative     Objective:      VITALS:  Wt Readings from Last 3 Encounters:   08/06/24 81.1 kg (178 lb 11.2 oz)   07/05/24 78.9 kg (174 lb)   05/31/24 83 kg (183 lb)     BP Readings from Last 3 Encounters:   08/06/24 140/80   05/31/24 135/74   05/06/24 122/80     Pulse Readings from Last 3 Encounters:   08/06/24 76   05/31/24 80   05/06/24 70     Body mass index is 28.84 kg/m².    Laboratory Results:   All pertinent labs and studies were reviewed with patient during this office visit with highlights of the results contained in this note in the ASSESSMENT AND PLAN section       Physical Exam    General  Patient in no acute distress, well appearing, well nourished and appears stated age    Mental status  Good judgment and insight, oriented to time person and place, recent and remote memory is intact, mood and affect are normal, cooperative, and patient is reasonable.    Right elbow  Mildly swollen olecranon bursitis about half a centimeter up from his elbow

## 2024-08-31 DIAGNOSIS — I10 ESSENTIAL HYPERTENSION: ICD-10-CM

## 2024-09-02 RX ORDER — HYDROCHLOROTHIAZIDE 25 MG/1
37.5 TABLET ORAL DAILY
Qty: 45 TABLET | Refills: 5 | Status: SHIPPED | OUTPATIENT
Start: 2024-09-02

## 2024-09-06 ENCOUNTER — OFFICE VISIT (OUTPATIENT)
Dept: OBGYN CLINIC | Facility: CLINIC | Age: 79
End: 2024-09-06
Payer: MEDICARE

## 2024-09-06 VITALS
WEIGHT: 180 LBS | SYSTOLIC BLOOD PRESSURE: 150 MMHG | HEIGHT: 66 IN | BODY MASS INDEX: 28.93 KG/M2 | DIASTOLIC BLOOD PRESSURE: 74 MMHG

## 2024-09-06 DIAGNOSIS — M17.11 PRIMARY OSTEOARTHRITIS OF RIGHT KNEE: Primary | ICD-10-CM

## 2024-09-06 DIAGNOSIS — M25.561 CHRONIC PAIN OF RIGHT KNEE: ICD-10-CM

## 2024-09-06 DIAGNOSIS — N18.30 STAGE 3 CHRONIC KIDNEY DISEASE, UNSPECIFIED WHETHER STAGE 3A OR 3B CKD (HCC): ICD-10-CM

## 2024-09-06 DIAGNOSIS — G89.29 CHRONIC PAIN OF RIGHT KNEE: ICD-10-CM

## 2024-09-06 PROCEDURE — 20610 DRAIN/INJ JOINT/BURSA W/O US: CPT | Performed by: ORTHOPAEDIC SURGERY

## 2024-09-06 PROCEDURE — 99213 OFFICE O/P EST LOW 20 MIN: CPT | Performed by: ORTHOPAEDIC SURGERY

## 2024-09-06 RX ORDER — KETOROLAC TROMETHAMINE 30 MG/ML
30 INJECTION, SOLUTION INTRAMUSCULAR; INTRAVENOUS
Status: COMPLETED | OUTPATIENT
Start: 2024-09-06 | End: 2024-09-06

## 2024-09-06 RX ORDER — BUPIVACAINE HYDROCHLORIDE 2.5 MG/ML
1 INJECTION, SOLUTION INFILTRATION; PERINEURAL
Status: COMPLETED | OUTPATIENT
Start: 2024-09-06 | End: 2024-09-06

## 2024-09-06 RX ORDER — METHYLPREDNISOLONE ACETATE 40 MG/ML
40 INJECTION, SUSPENSION INTRA-ARTICULAR; INTRALESIONAL; INTRAMUSCULAR; SOFT TISSUE
Status: COMPLETED | OUTPATIENT
Start: 2024-09-06 | End: 2024-09-06

## 2024-09-06 RX ADMIN — BUPIVACAINE HYDROCHLORIDE 1 ML: 2.5 INJECTION, SOLUTION INFILTRATION; PERINEURAL at 09:45

## 2024-09-06 RX ADMIN — KETOROLAC TROMETHAMINE 30 MG: 30 INJECTION, SOLUTION INTRAMUSCULAR; INTRAVENOUS at 09:45

## 2024-09-06 RX ADMIN — METHYLPREDNISOLONE ACETATE 40 MG: 40 INJECTION, SUSPENSION INTRA-ARTICULAR; INTRALESIONAL; INTRAMUSCULAR; SOFT TISSUE at 09:45

## 2024-09-06 NOTE — PROGRESS NOTES
Assessment:  1. Primary osteoarthritis of right knee        2. Chronic pain of right knee            Plan:    Patient with chronic right knee pain due to osteoarthritis   Weightbearing as tolerated   Patient received a right knee steroid injection in the office today   Provided patient with VMO strengthening exercises   Follow up in 3 months for repeat CSI         The above stated was discussed in layman's terms and the patient expressed understanding.  All questions were answered to the patient's satisfaction.         Subjective: KRISTIN Cota is a 78 y.o. male who presents right knee pain due to osteoarthritis. He had a right knee steroid injection on 5/31/24 and states he had relief for one month only due to doing increase activities. He is having pan over the anterior aspect of the right knee. His pain is worse with weightbearing.He denies numbness or tingling.       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    unspecified laterality    Colon polyp     Diabetes mellitus (HCC)     Diverticulitis of colon 02/22/2011    Ganglion     last assessed [id number]    Gastroenteritis     last assessed [id number]    Glucose intolerance     last assessed [id number]    Hyperesthesia 11/30/2010    Hyperlipidemia     Hypertension     Rectal hemorrhage 06/01/2010    Tongue pain 04/23/2024    Trigger middle finger of right hand 05/19/2017       Past Surgical History:   Procedure Laterality Date    ADENOIDECTOMY  01/01/1950 1950    INCISIONAL BREAST BIOPSY Left 01/01/1980    L breast did bx 1980    NECK SURGERY  01/01/1950    neck gland removed as child 1950    TONSILLECTOMY  01/01/1950 1950       Family History   Problem Relation Age of Onset    Alzheimer's disease Mother     Heart attack  Father     Alcohol abuse Neg Hx     Substance Abuse Neg Hx        Social History     Occupational History    Not on file   Tobacco Use    Smoking status: Former     Current packs/day: 0.00     Types: Cigarettes     Quit date:      Years since quittin.7    Smokeless tobacco: Never    Tobacco comments:     quit in [id number]   Vaping Use    Vaping status: Never Used   Substance and Sexual Activity    Alcohol use: Yes     Comment: social    Drug use: No    Sexual activity: Not on file         Current Outpatient Medications:     albuterol (PROVENTIL HFA,VENTOLIN HFA) 90 mcg/act inhaler, Inhale 2 puffs every 6 (six) hours as needed for wheezing or shortness of breath, Disp: 8 g, Rfl: 1    Alpha-Lipoic Acid 600 MG CAPS, Take by mouth, Disp: , Rfl:     aspirin 81 mg chewable tablet, Chew 1 tablet daily, Disp: , Rfl:     Cholecalciferol (VITAMIN D3) 400 units CAPS, Take by mouth, Disp: , Rfl:     famotidine (PEPCID) 40 MG tablet, Take 1 tablet (40 mg total) by mouth daily at bedtime, Disp: 30 tablet, Rfl: 3    fluticasone (FLONASE) 50 mcg/act nasal spray, 1 spray into each nostril daily, Disp: 9.9 mL, Rfl: 1    glucose blood (Randy Contour Next Test) test strip, 1 each by Other route as needed (Use once daily), Disp: 100 each, Rfl: 3    hydroCHLOROthiazide 25 mg tablet, TAKE ONE-HALF TABLET BY MOUTH  DAILY, Disp: 45 tablet, Rfl: 5    lisinopril (ZESTRIL) 40 mg tablet, Take 1 tablet (40 mg total) by mouth daily, Disp: 90 tablet, Rfl: 1    MICROLET LANCETS MISC, Test sugars twice a day, Disp: 100 each, Rfl: 3    multivitamin-minerals (CENTRUM ADULTS) tablet, Take 1 tablet by mouth daily, Disp: , Rfl:     ofloxacin (OCUFLOX) 0.3 % ophthalmic solution, INSTILL 1 DROP TO OPERATIVE EYE 4 TIMES A DAY AS DIRECTED, Disp: , Rfl:     pantoprazole (PROTONIX) 40 mg tablet, Take 1 tablet (40 mg total) by mouth daily 30 min or more prior to eating/drinking in the morning, Disp: 30 tablet, Rfl: 3    prednisoLONE acetate (PRED  FORTE) 1 % ophthalmic suspension, INSTILL 1 DROP TO OPERATIVE EYE 4 TIMES A DAY AS DIRECTED, Disp: , Rfl:     simvastatin (ZOCOR) 5 MG tablet, TAKE 1 TABLET BY MOUTH DAILY AT  BEDTIME, Disp: 90 tablet, Rfl: 1    Allergies   Allergen Reactions    Iodine - Food Allergy      Other reaction(s): was told by nurse: heat rash?            Vitals:    09/06/24 0946   BP: 150/74     Body mass index is 29.05 kg/m².  Wt Readings from Last 3 Encounters:   09/06/24 81.6 kg (180 lb)   08/09/24 80.8 kg (178 lb 3.2 oz)   08/06/24 81.1 kg (178 lb 11.2 oz)       Objective:            Physical Exam  Physical Exam:      General Appearance:    Alert, cooperative, no distress, appears stated age   Head:    Normocephalic, without obvious abnormality, atraumatic   Eyes:    conjunctiva/corneas clear, both eyes         Nose:   Nares normal, septum midline, no drainage    Throat:   Lips normal; teeth and gums normal   Neck:    symmetrical, trachea midline, ;     thyroid:  no enlargement/   Back:     Symmetric, no curvature, ROM normal   Lungs:   No audible wheezing or labored breathing   Chest Wall:    No tenderness or deformity    Heart:    Regular rate and rhythm                         Pulses:   2+ and symmetric all extremities   Skin:   Skin color, texture, turgor normal, no rashes or lesions   Neurologic:   normal strength, sensation and reflexes     throughout                       Ortho Exam  Right  Knee  Skin intact  No erythema or open wounds  No effusion  Tenderness palpation of medial joint line  No lateral joint line tenderness  Near full extension  Full flexion  Patellar grind test +/-  Stable to varus and valgus stress  Negative posterior drawer  Negative Lachman test  Neurovascular intact distally       Diagnostics, reviewed and taken today if performed as documented:    None performed      The attending physician has personally reviewed the pertinent films in PACS and interpretation is as follows:      Procedures, if performed  "today:    Large joint arthrocentesis: R knee  [place] Protocol:  procedure performed by consultantConsent: Verbal consent obtained.  Risks and benefits: risks, benefits and alternatives were discussed  Consent given by: patient  Time out: Immediately prior to procedure a \"time out\" was called to verify the correct patient, procedure, equipment, support staff and site/side marked as required.  Timeout called at: 9/6/2024 10:34 AM.  Patient understanding: patient states understanding of the procedure being performed  Site marked: the operative site was marked  Supporting Documentation  Indications: pain   Procedure Details  Location: knee - R knee  Needle size: 22 G  Approach: lateral  Medications administered: 1 mL bupivacaine 0.25 %; 40 mg methylPREDNISolone acetate 40 mg/mL; 30 mg ketorolac 30 mg/mL    Patient tolerance: patient tolerated the procedure well with no immediate complications  Dressing:  Sterile dressing applied            Scribe Attestation      I,:  Unique Chatman MA am acting as a scribe while in the presence of the attending physician.:       I,:  Theresa Hernandez MD personally performed the services described in this documentation    as scribed in my presence.:               Portions of the record may have been created with voice recognition software.  Occasional wrong word or \"sound a like\" substitutions may have occurred due to the inherent limitations of voice recognition software.  Read the chart carefully and recognize, using context, where substitutions have occurred.  "

## 2024-09-10 DIAGNOSIS — I10 ESSENTIAL HYPERTENSION: ICD-10-CM

## 2024-09-11 RX ORDER — LISINOPRIL 40 MG/1
40 TABLET ORAL DAILY
Qty: 90 TABLET | Refills: 1 | Status: SHIPPED | OUTPATIENT
Start: 2024-09-11

## 2024-09-30 ENCOUNTER — IMMUNIZATIONS (OUTPATIENT)
Dept: FAMILY MEDICINE CLINIC | Facility: CLINIC | Age: 79
End: 2024-09-30
Payer: MEDICARE

## 2024-09-30 DIAGNOSIS — Z23 ENCOUNTER FOR IMMUNIZATION: Primary | ICD-10-CM

## 2024-09-30 PROCEDURE — 90662 IIV NO PRSV INCREASED AG IM: CPT

## 2024-09-30 PROCEDURE — G0008 ADMIN INFLUENZA VIRUS VAC: HCPCS

## 2024-10-14 ENCOUNTER — APPOINTMENT (OUTPATIENT)
Dept: LAB | Facility: HOSPITAL | Age: 79
End: 2024-10-14
Payer: MEDICARE

## 2024-10-14 DIAGNOSIS — E11.29 TYPE 2 DIABETES MELLITUS WITH MICROALBUMINURIA, WITHOUT LONG-TERM CURRENT USE OF INSULIN (HCC): ICD-10-CM

## 2024-10-14 DIAGNOSIS — R80.9 TYPE 2 DIABETES MELLITUS WITH MICROALBUMINURIA, WITHOUT LONG-TERM CURRENT USE OF INSULIN (HCC): ICD-10-CM

## 2024-10-14 LAB
EST. AVERAGE GLUCOSE BLD GHB EST-MCNC: 148 MG/DL
HBA1C MFR BLD: 6.8 %

## 2024-10-14 PROCEDURE — 83036 HEMOGLOBIN GLYCOSYLATED A1C: CPT

## 2024-10-14 PROCEDURE — 36415 COLL VENOUS BLD VENIPUNCTURE: CPT

## 2024-10-28 ENCOUNTER — OFFICE VISIT (OUTPATIENT)
Dept: FAMILY MEDICINE CLINIC | Facility: CLINIC | Age: 79
End: 2024-10-28
Payer: MEDICARE

## 2024-10-28 VITALS
WEIGHT: 181.2 LBS | BODY MASS INDEX: 29.12 KG/M2 | SYSTOLIC BLOOD PRESSURE: 132 MMHG | DIASTOLIC BLOOD PRESSURE: 82 MMHG | HEART RATE: 85 BPM | HEIGHT: 66 IN | RESPIRATION RATE: 18 BRPM | TEMPERATURE: 98 F | OXYGEN SATURATION: 97 %

## 2024-10-28 DIAGNOSIS — K21.9 GASTROESOPHAGEAL REFLUX DISEASE WITHOUT ESOPHAGITIS: ICD-10-CM

## 2024-10-28 DIAGNOSIS — R80.9 TYPE 2 DIABETES MELLITUS WITH MICROALBUMINURIA, WITHOUT LONG-TERM CURRENT USE OF INSULIN (HCC): Primary | ICD-10-CM

## 2024-10-28 DIAGNOSIS — E55.9 VITAMIN D DEFICIENCY: ICD-10-CM

## 2024-10-28 DIAGNOSIS — N18.30 STAGE 3 CHRONIC KIDNEY DISEASE, UNSPECIFIED WHETHER STAGE 3A OR 3B CKD (HCC): ICD-10-CM

## 2024-10-28 DIAGNOSIS — E11.29 TYPE 2 DIABETES MELLITUS WITH MICROALBUMINURIA, WITHOUT LONG-TERM CURRENT USE OF INSULIN (HCC): Primary | ICD-10-CM

## 2024-10-28 DIAGNOSIS — F51.02 ADJUSTMENT INSOMNIA: ICD-10-CM

## 2024-10-28 DIAGNOSIS — E78.2 MIXED HYPERLIPIDEMIA: ICD-10-CM

## 2024-10-28 DIAGNOSIS — Z23 NEED FOR COVID-19 VACCINE: ICD-10-CM

## 2024-10-28 DIAGNOSIS — I10 ESSENTIAL HYPERTENSION: ICD-10-CM

## 2024-10-28 DIAGNOSIS — E11.42 DIABETIC POLYNEUROPATHY ASSOCIATED WITH TYPE 2 DIABETES MELLITUS (HCC): ICD-10-CM

## 2024-10-28 PROBLEM — H40.1231 LOW-TENSION GLAUCOMA, BILATERAL, MILD STAGE: Status: ACTIVE | Noted: 2024-10-28

## 2024-10-28 PROCEDURE — 90480 ADMN SARSCOV2 VAC 1/ONLY CMP: CPT

## 2024-10-28 PROCEDURE — 99214 OFFICE O/P EST MOD 30 MIN: CPT | Performed by: FAMILY MEDICINE

## 2024-10-28 PROCEDURE — 91320 SARSCV2 VAC 30MCG TRS-SUC IM: CPT

## 2024-10-28 RX ORDER — SIMVASTATIN 5 MG
5 TABLET ORAL
Qty: 90 TABLET | Refills: 3 | Status: SHIPPED | OUTPATIENT
Start: 2024-10-28

## 2024-10-28 NOTE — PATIENT INSTRUCTIONS
Family is considering taking time for yourself and getting your knees repaired if Dr. Dr. Horner says you need to    See you back in 6 months hopefully with the better A1c  I will be see you back sooner if needed    You will need fasting blood work and urine before next visit by 2 weeks
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
## 2024-12-06 ENCOUNTER — OFFICE VISIT (OUTPATIENT)
Dept: OBGYN CLINIC | Facility: CLINIC | Age: 79
End: 2024-12-06
Payer: MEDICARE

## 2024-12-06 VITALS
BODY MASS INDEX: 29.09 KG/M2 | DIASTOLIC BLOOD PRESSURE: 76 MMHG | HEIGHT: 66 IN | WEIGHT: 181 LBS | SYSTOLIC BLOOD PRESSURE: 135 MMHG | HEART RATE: 87 BPM

## 2024-12-06 DIAGNOSIS — M17.11 PRIMARY OSTEOARTHRITIS OF RIGHT KNEE: Primary | ICD-10-CM

## 2024-12-06 DIAGNOSIS — E11.29 TYPE 2 DIABETES MELLITUS WITH MICROALBUMINURIA, WITHOUT LONG-TERM CURRENT USE OF INSULIN (HCC): ICD-10-CM

## 2024-12-06 DIAGNOSIS — R80.9 TYPE 2 DIABETES MELLITUS WITH MICROALBUMINURIA, WITHOUT LONG-TERM CURRENT USE OF INSULIN (HCC): ICD-10-CM

## 2024-12-06 PROCEDURE — 20610 DRAIN/INJ JOINT/BURSA W/O US: CPT | Performed by: ORTHOPAEDIC SURGERY

## 2024-12-06 PROCEDURE — 99213 OFFICE O/P EST LOW 20 MIN: CPT | Performed by: ORTHOPAEDIC SURGERY

## 2024-12-06 RX ORDER — BUPIVACAINE HYDROCHLORIDE 2.5 MG/ML
1 INJECTION, SOLUTION INFILTRATION; PERINEURAL
Status: COMPLETED | OUTPATIENT
Start: 2024-12-06 | End: 2024-12-06

## 2024-12-06 RX ORDER — METHYLPREDNISOLONE ACETATE 40 MG/ML
2 INJECTION, SUSPENSION INTRA-ARTICULAR; INTRALESIONAL; INTRAMUSCULAR; SOFT TISSUE
Status: COMPLETED | OUTPATIENT
Start: 2024-12-06 | End: 2024-12-06

## 2024-12-06 RX ORDER — TRIAMCINOLONE ACETONIDE 40 MG/ML
40 INJECTION, SUSPENSION INTRA-ARTICULAR; INTRAMUSCULAR
Status: COMPLETED | OUTPATIENT
Start: 2024-12-06 | End: 2024-12-06

## 2024-12-06 RX ADMIN — METHYLPREDNISOLONE ACETATE 2 ML: 40 INJECTION, SUSPENSION INTRA-ARTICULAR; INTRALESIONAL; INTRAMUSCULAR; SOFT TISSUE at 09:45

## 2024-12-06 RX ADMIN — TRIAMCINOLONE ACETONIDE 40 MG: 40 INJECTION, SUSPENSION INTRA-ARTICULAR; INTRAMUSCULAR at 09:45

## 2024-12-06 RX ADMIN — BUPIVACAINE HYDROCHLORIDE 1 ML: 2.5 INJECTION, SOLUTION INFILTRATION; PERINEURAL at 09:45

## 2024-12-06 NOTE — PROGRESS NOTES
Assessment:   Diagnosis ICD-10-CM Associated Orders   1. Primary osteoarthritis of right knee  M17.11 Large joint arthrocentesis: R knee          Plan:  Patient with chronic right knee pain due to osteoarthritis   Weightbearing as tolerated   Patient received a right knee steroid injection in the office today   Provided patient with VMO strengthening exercises   Follow up in 3 months for repeat CSI     To do next visit:  Return in about 3 months (around 3/6/2025).    The above stated was discussed in layman's terms and the patient expressed understanding.  All questions were answered to the patient's satisfaction.       Scribe Attestation      I,:  Benny Mace am acting as a scribe while in the presence of the attending physician.:       I,:  Theresa Hernandez MD personally performed the services described in this documentation    as scribed in my presence.:               Subjective:   Oma Cota is a 79 y.o. male who presents today for follow up for right knee primary osteoarthritis. He states he is doing well overall at this time. He reports he got about 2 months of relief from his previous cortisone injection. He has been doing some at home exercises on occasion. He does have the most pain when going up and down stairs. He is here today for a repeat cortisone injection.      Review of systems negative unless otherwise specified in HPI  Review of Systems   Constitutional:  Negative for chills and fever.   HENT:  Negative for ear pain and sore throat.    Eyes:  Negative for pain and visual disturbance.   Respiratory:  Negative for cough and shortness of breath.    Cardiovascular:  Negative for chest pain and palpitations.   Gastrointestinal:  Negative for abdominal pain and vomiting.   Genitourinary:  Negative for dysuria and hematuria.   Musculoskeletal:  Negative for arthralgias and back pain.   Skin:  Negative for color change and rash.   Neurological:  Negative for seizures and syncope.   All other  systems reviewed and are negative.      Past Medical History:   Diagnosis Date    Abnormal glucose     last assessed [id number]    Anemia     last assessed [id number]    Benign neoplasm of descending colon 2009    Chondromalacia of patella 2011    unspecified laterality    Colon polyp     Diabetes mellitus (HCC)     Diverticulitis of colon 2011    Ganglion     last assessed [id number]    Gastroenteritis     last assessed [id number]    Glucose intolerance     last assessed [id number]    Hyperesthesia 2010    Hyperlipidemia     Hypertension     Rectal hemorrhage 2010    Tongue pain 2024    Trigger middle finger of right hand 2017       Past Surgical History:   Procedure Laterality Date    ADENOIDECTOMY  1950    INCISIONAL BREAST BIOPSY Left 1980    L breast did bx     NECK SURGERY  1950    neck gland removed as child     TONSILLECTOMY  1950       Family History   Problem Relation Age of Onset    Alzheimer's disease Mother     Heart attack Father     Alcohol abuse Neg Hx     Substance Abuse Neg Hx        Social History     Occupational History    Not on file   Tobacco Use    Smoking status: Former     Current packs/day: 0.00     Types: Cigarettes     Quit date:      Years since quittin.9    Smokeless tobacco: Never    Tobacco comments:     quit in [id number]   Vaping Use    Vaping status: Never Used   Substance and Sexual Activity    Alcohol use: Yes     Comment: social    Drug use: No    Sexual activity: Not on file         Current Outpatient Medications:     Alpha-Lipoic Acid 600 MG CAPS, Take by mouth, Disp: , Rfl:     aspirin 81 mg chewable tablet, Chew 1 tablet daily, Disp: , Rfl:     Cholecalciferol (VITAMIN D3) 400 units CAPS, Take by mouth, Disp: , Rfl:     famotidine (PEPCID) 40 MG tablet, Take 1 tablet (40 mg total) by mouth 2 (two) times a day, Disp: 180 tablet, Rfl: 1    glucose blood (Randy Contour Next Test) test  strip, 1 each by Other route as needed (Use once daily), Disp: 100 each, Rfl: 3    hydroCHLOROthiazide 25 mg tablet, TAKE ONE-HALF TABLET BY MOUTH  DAILY, Disp: 45 tablet, Rfl: 5    lisinopril (ZESTRIL) 40 mg tablet, TAKE 1 TABLET BY MOUTH DAILY, Disp: 90 tablet, Rfl: 1    MICROLET LANCETS MISC, Test sugars twice a day, Disp: 100 each, Rfl: 3    multivitamin-minerals (CENTRUM ADULTS) tablet, Take 1 tablet by mouth daily, Disp: , Rfl:     ofloxacin (OCUFLOX) 0.3 % ophthalmic solution, INSTILL 1 DROP TO OPERATIVE EYE 4 TIMES A DAY AS DIRECTED, Disp: , Rfl:     pantoprazole (PROTONIX) 40 mg tablet, take 1 tablet by mouth 30 MINUTES OR MORE PRIOR TO EATING/DRINKING IN THE MORNING, Disp: 90 tablet, Rfl: 3    prednisoLONE acetate (PRED FORTE) 1 % ophthalmic suspension, INSTILL 1 DROP TO OPERATIVE EYE 4 TIMES A DAY AS DIRECTED, Disp: , Rfl:     simvastatin (ZOCOR) 5 MG tablet, Take 1 tablet (5 mg total) by mouth daily at bedtime, Disp: 90 tablet, Rfl: 3    Allergies   Allergen Reactions    Other Allergic Rhinitis     Seasoal    Iodine - Food Allergy      Other reaction(s): was told by nurse: heat rash?            Vitals:    12/06/24 0953   BP: 135/76   Pulse: 87       Body mass index is 29.21 kg/m².  Wt Readings from Last 3 Encounters:   12/06/24 82.1 kg (181 lb)   10/28/24 82.2 kg (181 lb 3.2 oz)   09/06/24 81.6 kg (180 lb)       Objective:                    Right Knee Exam     Tenderness   The patient is experiencing tenderness in the medial joint line.    Range of Motion   Extension:  0   Flexion:  120     Tests   Varus: negative Valgus: negative    Other   Erythema: absent  Scars: absent  Sensation: normal  Pulse: present  Swelling: none  Effusion: no effusion present            Diagnostics, reviewed and taken today if performed as documented:    None performed        Procedures, if performed today:    Large joint arthrocentesis: R knee  Universal Protocol:  Consent: Verbal consent obtained.  Risks and benefits:  "risks, benefits and alternatives were discussed  Consent given by: patient  Patient understanding: patient states understanding of the procedure being performed  Site marked: the operative site was marked  Patient identity confirmed: verbally with patient  Supporting Documentation  Indications: pain   Procedure Details  Location: knee - R knee  Needle size: 25 G  Ultrasound guidance: no  Approach: anterolateral  Medications administered: 1 mL bupivacaine 0.25 %; 2 mL methylPREDNISolone acetate 40 mg/mL; 40 mg triamcinolone acetonide 40 mg/mL    Patient tolerance: patient tolerated the procedure well with no immediate complications  Dressing:  Sterile dressing applied          None performed      Portions of the record may have been created with voice recognition software.  Occasional wrong word or \"sound a like\" substitutions may have occurred due to the inherent limitations of voice recognition software.  Read the chart carefully and recognize, using context, where substitutions have occurred.   "

## 2025-02-03 DIAGNOSIS — I10 ESSENTIAL HYPERTENSION: ICD-10-CM

## 2025-02-04 RX ORDER — LISINOPRIL 40 MG/1
40 TABLET ORAL DAILY
Qty: 90 TABLET | Refills: 1 | Status: SHIPPED | OUTPATIENT
Start: 2025-02-04

## 2025-03-07 ENCOUNTER — OFFICE VISIT (OUTPATIENT)
Dept: OBGYN CLINIC | Facility: CLINIC | Age: 80
End: 2025-03-07
Payer: MEDICARE

## 2025-03-07 VITALS — BODY MASS INDEX: 30.05 KG/M2 | WEIGHT: 187 LBS | HEIGHT: 66 IN

## 2025-03-07 DIAGNOSIS — R80.9 TYPE 2 DIABETES MELLITUS WITH MICROALBUMINURIA, WITHOUT LONG-TERM CURRENT USE OF INSULIN (HCC): ICD-10-CM

## 2025-03-07 DIAGNOSIS — G89.29 CHRONIC PAIN OF RIGHT KNEE: ICD-10-CM

## 2025-03-07 DIAGNOSIS — M17.11 PRIMARY OSTEOARTHRITIS OF RIGHT KNEE: Primary | ICD-10-CM

## 2025-03-07 DIAGNOSIS — M25.561 CHRONIC PAIN OF RIGHT KNEE: ICD-10-CM

## 2025-03-07 DIAGNOSIS — E11.29 TYPE 2 DIABETES MELLITUS WITH MICROALBUMINURIA, WITHOUT LONG-TERM CURRENT USE OF INSULIN (HCC): ICD-10-CM

## 2025-03-07 PROCEDURE — 20610 DRAIN/INJ JOINT/BURSA W/O US: CPT | Performed by: ORTHOPAEDIC SURGERY

## 2025-03-07 PROCEDURE — 99213 OFFICE O/P EST LOW 20 MIN: CPT | Performed by: ORTHOPAEDIC SURGERY

## 2025-03-07 RX ORDER — TRIAMCINOLONE ACETONIDE 40 MG/ML
40 INJECTION, SUSPENSION INTRA-ARTICULAR; INTRAMUSCULAR
Status: COMPLETED | OUTPATIENT
Start: 2025-03-07 | End: 2025-03-07

## 2025-03-07 RX ORDER — BUPIVACAINE HYDROCHLORIDE 2.5 MG/ML
1 INJECTION, SOLUTION INFILTRATION; PERINEURAL
Status: COMPLETED | OUTPATIENT
Start: 2025-03-07 | End: 2025-03-07

## 2025-03-07 RX ORDER — KETOROLAC TROMETHAMINE 30 MG/ML
30 INJECTION, SOLUTION INTRAMUSCULAR; INTRAVENOUS
Status: COMPLETED | OUTPATIENT
Start: 2025-03-07 | End: 2025-03-07

## 2025-03-07 RX ADMIN — BUPIVACAINE HYDROCHLORIDE 1 ML: 2.5 INJECTION, SOLUTION INFILTRATION; PERINEURAL at 10:15

## 2025-03-07 RX ADMIN — KETOROLAC TROMETHAMINE 30 MG: 30 INJECTION, SOLUTION INTRAMUSCULAR; INTRAVENOUS at 10:15

## 2025-03-07 RX ADMIN — TRIAMCINOLONE ACETONIDE 40 MG: 40 INJECTION, SUSPENSION INTRA-ARTICULAR; INTRAMUSCULAR at 10:15

## 2025-03-07 NOTE — ASSESSMENT & PLAN NOTE
Due to patient not having significant relief with the last cortisone injection, today we changed the cortisone type to Kenalog for his injection.  Encouraged the patient to watch how this injection does for him in regards to his pain and discomfort.  We will pre-CERT Visco injections for his right knee osteoarthritis.  We do encourage the patient to work on his quad exercises which can help him with going downstairs.  Orders:    Large joint arthrocentesis: R knee    Injection Procedure Prior Authorization; Future

## 2025-03-07 NOTE — PROGRESS NOTES
Assessment:  Assessment & Plan  Primary osteoarthritis of right knee  Due to patient not having significant relief with the last cortisone injection, today we changed the cortisone type to Kenalog for his injection.  Encouraged the patient to watch how this injection does for him in regards to his pain and discomfort.  We will pre-CERT Visco injections for his right knee osteoarthritis.  We do encourage the patient to work on his quad exercises which can help him with going downstairs.  Orders:    Large joint arthrocentesis: R knee    Injection Procedure Prior Authorization; Future    Type 2 diabetes mellitus with microalbuminuria, without long-term current use of insulin ([facility])  Reminded to the patient that cortisone injections can transiently elevate his daily sugars.  Lab Results   Component Value Date    HGBA1C 6.8 (H) 10/14/2024            Chronic pain of right knee           To do next visit:  Return for Follow up Visco injections.    The above stated was discussed in layman's terms and the patient expressed understanding.  All questions were answered to the patient's satisfaction.       Scribe Attestation      I,:  Joan Dey am acting as a scribe while in the presence of the attending physician.:       I,:  Theresa Hernandez MD personally performed the services described in this documentation    as scribed in my presence.:               Subjective:   Oma Cota is a 79 y.o. male who presents today for a follow-up for his right knee pain due to osteoarthritis.  Patient gets significant relief with periodic cortisone injections, his last injections were performed on 12/6/2024.  Patient reports that the last injection did not give him any significant relief.  He notes that it has been colder than usual and is not sure if the weather had an effect on his knee pain.  He still wants to treat conservatively due to being the sole caretaker of his wife.  Most of his pain is dull and achy in nature on the  medial aspect of the right knee.  He does have trouble going up and down stairs.  He notes he has not been consistent with his home exercise program.      Review of systems negative unless otherwise specified in HPI  Review of Systems   Constitutional:  Negative for chills, fever and unexpected weight change.   HENT:  Negative for hearing loss, nosebleeds and sore throat.    Eyes:  Negative for pain, redness and visual disturbance.   Respiratory:  Negative for cough, shortness of breath and wheezing.    Cardiovascular:  Negative for chest pain, palpitations and leg swelling.   Gastrointestinal:  Negative for abdominal pain, nausea and vomiting.   Endocrine: Negative for polydipsia and polyuria.   Genitourinary:  Negative for dysuria and hematuria.   Musculoskeletal:  Positive for arthralgias.   Skin:  Negative for rash and wound.   Neurological:  Negative for dizziness, light-headedness and headaches.   Psychiatric/Behavioral:  Negative for decreased concentration, dysphoric mood and suicidal ideas. The patient is not nervous/anxious.        Past Medical History:   Diagnosis Date    Abnormal glucose     last assessed [id number]    Anemia     last assessed [id number]    Benign neoplasm of descending colon 12/01/2009    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    unspecified laterality    Colon polyp     Diabetes mellitus (HCC)     Diverticulitis of colon 02/22/2011    Ganglion     last assessed [id number]    Gastroenteritis     last assessed [id number]    Glucose intolerance     last assessed [id number]    Hyperesthesia 11/30/2010    Hyperlipidemia     Hypertension     Rectal hemorrhage 06/01/2010    Tongue pain 04/23/2024    Trigger middle finger of right hand 05/19/2017       Past Surgical History:   Procedure Laterality Date    ADENOIDECTOMY  01/01/1950 1950    INCISIONAL BREAST BIOPSY Left 01/01/1980    L breast did bx 1980    NECK SURGERY  01/01/1950    neck gland removed as child 1950    TONSILLECTOMY  01/01/1950     1950       Family History   Problem Relation Age of Onset    Alzheimer's disease Mother     Heart attack Father     Alcohol abuse Neg Hx     Substance Abuse Neg Hx        Social History     Occupational History    Not on file   Tobacco Use    Smoking status: Former     Current packs/day: 0.00     Types: Cigarettes     Quit date:      Years since quittin.2    Smokeless tobacco: Never    Tobacco comments:     quit in [id number]   Vaping Use    Vaping status: Never Used   Substance and Sexual Activity    Alcohol use: Yes     Comment: social    Drug use: No    Sexual activity: Not on file         Current Outpatient Medications:     Alpha-Lipoic Acid 600 MG CAPS, Take by mouth, Disp: , Rfl:     aspirin 81 mg chewable tablet, Chew 1 tablet daily, Disp: , Rfl:     Cholecalciferol (VITAMIN D3) 400 units CAPS, Take by mouth, Disp: , Rfl:     famotidine (PEPCID) 40 MG tablet, Take 1 tablet (40 mg total) by mouth 2 (two) times a day, Disp: 180 tablet, Rfl: 1    glucose blood (Randy Contour Next Test) test strip, 1 each by Other route as needed (Use once daily), Disp: 100 each, Rfl: 3    hydroCHLOROthiazide 25 mg tablet, TAKE ONE-HALF TABLET BY MOUTH  DAILY, Disp: 45 tablet, Rfl: 5    lisinopril (ZESTRIL) 40 mg tablet, TAKE 1 TABLET BY MOUTH DAILY, Disp: 90 tablet, Rfl: 1    MICROLET LANCETS MISC, Test sugars twice a day, Disp: 100 each, Rfl: 3    multivitamin-minerals (CENTRUM ADULTS) tablet, Take 1 tablet by mouth daily, Disp: , Rfl:     ofloxacin (OCUFLOX) 0.3 % ophthalmic solution, INSTILL 1 DROP TO OPERATIVE EYE 4 TIMES A DAY AS DIRECTED, Disp: , Rfl:     pantoprazole (PROTONIX) 40 mg tablet, take 1 tablet by mouth 30 MINUTES OR MORE PRIOR TO EATING/DRINKING IN THE MORNING, Disp: 90 tablet, Rfl: 3    prednisoLONE acetate (PRED FORTE) 1 % ophthalmic suspension, INSTILL 1 DROP TO OPERATIVE EYE 4 TIMES A DAY AS DIRECTED, Disp: , Rfl:     simvastatin (ZOCOR) 5 MG tablet, Take 1 tablet (5 mg total) by mouth daily at  "bedtime, Disp: 90 tablet, Rfl: 3    Allergies   Allergen Reactions    Other Allergic Rhinitis     Seasoal    Iodine - Food Allergy      Other reaction(s): was told by nurse: heat rash?          There were no vitals filed for this visit.    Body mass index is 30.18 kg/m².  Wt Readings from Last 3 Encounters:   03/07/25 84.8 kg (187 lb)   12/06/24 82.1 kg (181 lb)   10/28/24 82.2 kg (181 lb 3.2 oz)       Objective:                    Right Knee Exam     Tenderness   The patient is experiencing tenderness in the medial joint line.    Range of Motion   Extension:  0   Flexion:  120     Tests   Varus: negative Valgus: negative  Patellar apprehension: negative    Other   Erythema: absent  Sensation: normal  Pulse: present  Swelling: none  Effusion: no effusion present    Comments:  Calf is soft and non tender        Distally neurovascularly intact     Diagnostics, reviewed and taken today if performed as documented:    None performed      The attending physician has personally reviewed the pertinent films in PACS and interpretation is as follows:      Procedures, if performed today:    Large joint arthrocentesis: R knee  Universal Protocol:  Consent: Verbal consent obtained.  Risks and benefits: risks, benefits and alternatives were discussed  Consent given by: patient  Time out: Immediately prior to procedure a \"time out\" was called to verify the correct patient, procedure, equipment, support staff and site/side marked as required.  Timeout called at: 3/7/2025 11:02 AM.  Patient understanding: patient states understanding of the procedure being performed  Site marked: the operative site was marked  Patient identity confirmed: verbally with patient  Supporting Documentation  Indications: pain   Procedure Details  Location: knee - R knee  Preparation: Patient was prepped and draped in the usual sterile fashion  Needle size: 22 G  Ultrasound guidance: no  Approach: anterolateral  Medications administered: 1 mL bupivacaine " "0.25 %; 30 mg ketorolac 30 mg/mL; 40 mg triamcinolone acetonide 40 mg/mL    Patient tolerance: patient tolerated the procedure well with no immediate complications  Dressing:  Sterile dressing applied          Portions of the record may have been created with voice recognition software.  Occasional wrong word or \"sound a like\" substitutions may have occurred due to the inherent limitations of voice recognition software.  Read the chart carefully and recognize, using context, where substitutions have occurred.  "

## 2025-03-07 NOTE — ASSESSMENT & PLAN NOTE
Reminded to the patient that cortisone injections can transiently elevate his daily sugars.  Lab Results   Component Value Date    HGBA1C 6.8 (H) 10/14/2024

## 2025-04-17 ENCOUNTER — APPOINTMENT (OUTPATIENT)
Dept: LAB | Facility: HOSPITAL | Age: 80
End: 2025-04-17
Payer: MEDICARE

## 2025-04-17 DIAGNOSIS — I10 ESSENTIAL HYPERTENSION: ICD-10-CM

## 2025-04-17 DIAGNOSIS — E11.29 TYPE 2 DIABETES MELLITUS WITH MICROALBUMINURIA, WITHOUT LONG-TERM CURRENT USE OF INSULIN (HCC): ICD-10-CM

## 2025-04-17 DIAGNOSIS — E55.9 VITAMIN D DEFICIENCY: ICD-10-CM

## 2025-04-17 DIAGNOSIS — R80.9 TYPE 2 DIABETES MELLITUS WITH MICROALBUMINURIA, WITHOUT LONG-TERM CURRENT USE OF INSULIN (HCC): ICD-10-CM

## 2025-04-17 DIAGNOSIS — E78.2 MIXED HYPERLIPIDEMIA: ICD-10-CM

## 2025-04-17 LAB
25(OH)D3 SERPL-MCNC: 60.9 NG/ML (ref 30–100)
ALBUMIN SERPL BCG-MCNC: 4.2 G/DL (ref 3.5–5)
ALP SERPL-CCNC: 70 U/L (ref 34–104)
ALT SERPL W P-5'-P-CCNC: 25 U/L (ref 7–52)
ANION GAP SERPL CALCULATED.3IONS-SCNC: 8 MMOL/L (ref 4–13)
AST SERPL W P-5'-P-CCNC: 15 U/L (ref 13–39)
BASOPHILS # BLD AUTO: 0.06 THOUSANDS/ÂΜL (ref 0–0.1)
BASOPHILS NFR BLD AUTO: 1 % (ref 0–1)
BILIRUB SERPL-MCNC: 0.68 MG/DL (ref 0.2–1)
BILIRUB UR QL STRIP: NEGATIVE
BUN SERPL-MCNC: 23 MG/DL (ref 5–25)
CALCIUM SERPL-MCNC: 9.8 MG/DL (ref 8.4–10.2)
CHLORIDE SERPL-SCNC: 104 MMOL/L (ref 96–108)
CHOLEST SERPL-MCNC: 157 MG/DL (ref ?–200)
CLARITY UR: CLEAR
CO2 SERPL-SCNC: 27 MMOL/L (ref 21–32)
COLOR UR: YELLOW
CREAT SERPL-MCNC: 1.22 MG/DL (ref 0.6–1.3)
CREAT UR-MCNC: 147.4 MG/DL
EOSINOPHIL # BLD AUTO: 0.41 THOUSAND/ÂΜL (ref 0–0.61)
EOSINOPHIL NFR BLD AUTO: 4 % (ref 0–6)
ERYTHROCYTE [DISTWIDTH] IN BLOOD BY AUTOMATED COUNT: 13.2 % (ref 11.6–15.1)
EST. AVERAGE GLUCOSE BLD GHB EST-MCNC: 163 MG/DL
GFR SERPL CREATININE-BSD FRML MDRD: 56 ML/MIN/1.73SQ M
GLUCOSE P FAST SERPL-MCNC: 154 MG/DL (ref 65–99)
GLUCOSE UR STRIP-MCNC: NEGATIVE MG/DL
HBA1C MFR BLD: 7.3 %
HCT VFR BLD AUTO: 40.4 % (ref 36.5–49.3)
HDLC SERPL-MCNC: 36 MG/DL
HGB BLD-MCNC: 13.1 G/DL (ref 12–17)
HGB UR QL STRIP.AUTO: NEGATIVE
IMM GRANULOCYTES # BLD AUTO: 0.07 THOUSAND/UL (ref 0–0.2)
IMM GRANULOCYTES NFR BLD AUTO: 1 % (ref 0–2)
KETONES UR STRIP-MCNC: NEGATIVE MG/DL
LDLC SERPL CALC-MCNC: 96 MG/DL (ref 0–100)
LEUKOCYTE ESTERASE UR QL STRIP: NEGATIVE
LYMPHOCYTES # BLD AUTO: 1.35 THOUSANDS/ÂΜL (ref 0.6–4.47)
LYMPHOCYTES NFR BLD AUTO: 13 % (ref 14–44)
MCH RBC QN AUTO: 30.5 PG (ref 26.8–34.3)
MCHC RBC AUTO-ENTMCNC: 32.4 G/DL (ref 31.4–37.4)
MCV RBC AUTO: 94 FL (ref 82–98)
MICROALBUMIN UR-MCNC: 14.1 MG/L
MICROALBUMIN/CREAT 24H UR: 10 MG/G CREATININE (ref 0–30)
MONOCYTES # BLD AUTO: 1.25 THOUSAND/ÂΜL (ref 0.17–1.22)
MONOCYTES NFR BLD AUTO: 12 % (ref 4–12)
NEUTROPHILS # BLD AUTO: 7.2 THOUSANDS/ÂΜL (ref 1.85–7.62)
NEUTS SEG NFR BLD AUTO: 69 % (ref 43–75)
NITRITE UR QL STRIP: NEGATIVE
NONHDLC SERPL-MCNC: 121 MG/DL
NRBC BLD AUTO-RTO: 0 /100 WBCS
PH UR STRIP.AUTO: 5.5 [PH]
PLATELET # BLD AUTO: 294 THOUSANDS/UL (ref 149–390)
PMV BLD AUTO: 9.6 FL (ref 8.9–12.7)
POTASSIUM SERPL-SCNC: 4.6 MMOL/L (ref 3.5–5.3)
PROT SERPL-MCNC: 7.6 G/DL (ref 6.4–8.4)
PROT UR STRIP-MCNC: NEGATIVE MG/DL
RBC # BLD AUTO: 4.29 MILLION/UL (ref 3.88–5.62)
SODIUM SERPL-SCNC: 139 MMOL/L (ref 135–147)
SP GR UR STRIP.AUTO: 1.02 (ref 1–1.03)
TRIGL SERPL-MCNC: 124 MG/DL (ref ?–150)
TSH SERPL DL<=0.05 MIU/L-ACNC: 1.16 UIU/ML (ref 0.45–4.5)
UROBILINOGEN UR STRIP-ACNC: <2 MG/DL
WBC # BLD AUTO: 10.34 THOUSAND/UL (ref 4.31–10.16)

## 2025-04-17 PROCEDURE — 83036 HEMOGLOBIN GLYCOSYLATED A1C: CPT

## 2025-04-17 PROCEDURE — 80061 LIPID PANEL: CPT

## 2025-04-17 PROCEDURE — 84443 ASSAY THYROID STIM HORMONE: CPT

## 2025-04-17 PROCEDURE — 82570 ASSAY OF URINE CREATININE: CPT

## 2025-04-17 PROCEDURE — 81003 URINALYSIS AUTO W/O SCOPE: CPT

## 2025-04-17 PROCEDURE — 82306 VITAMIN D 25 HYDROXY: CPT

## 2025-04-17 PROCEDURE — 80053 COMPREHEN METABOLIC PANEL: CPT

## 2025-04-17 PROCEDURE — 36415 COLL VENOUS BLD VENIPUNCTURE: CPT

## 2025-04-17 PROCEDURE — 82043 UR ALBUMIN QUANTITATIVE: CPT

## 2025-04-17 PROCEDURE — 85025 COMPLETE CBC W/AUTO DIFF WBC: CPT

## 2025-04-29 ENCOUNTER — OFFICE VISIT (OUTPATIENT)
Dept: FAMILY MEDICINE CLINIC | Facility: CLINIC | Age: 80
End: 2025-04-29
Payer: MEDICARE

## 2025-04-29 VITALS
DIASTOLIC BLOOD PRESSURE: 80 MMHG | HEART RATE: 99 BPM | BODY MASS INDEX: 29.8 KG/M2 | HEIGHT: 66 IN | WEIGHT: 185.4 LBS | RESPIRATION RATE: 15 BRPM | TEMPERATURE: 97.7 F | SYSTOLIC BLOOD PRESSURE: 122 MMHG | OXYGEN SATURATION: 98 %

## 2025-04-29 DIAGNOSIS — E78.2 MIXED HYPERLIPIDEMIA: ICD-10-CM

## 2025-04-29 DIAGNOSIS — K20.90 ESOPHAGITIS: ICD-10-CM

## 2025-04-29 DIAGNOSIS — E55.9 VITAMIN D DEFICIENCY: ICD-10-CM

## 2025-04-29 DIAGNOSIS — E11.29 TYPE 2 DIABETES MELLITUS WITH MICROALBUMINURIA, WITHOUT LONG-TERM CURRENT USE OF INSULIN (HCC): Primary | ICD-10-CM

## 2025-04-29 DIAGNOSIS — K21.9 LARYNGOPHARYNGEAL REFLUX (LPR): ICD-10-CM

## 2025-04-29 DIAGNOSIS — N18.30 STAGE 3 CHRONIC KIDNEY DISEASE, UNSPECIFIED WHETHER STAGE 3A OR 3B CKD (HCC): ICD-10-CM

## 2025-04-29 DIAGNOSIS — R80.9 TYPE 2 DIABETES MELLITUS WITH MICROALBUMINURIA, WITHOUT LONG-TERM CURRENT USE OF INSULIN (HCC): Primary | ICD-10-CM

## 2025-04-29 DIAGNOSIS — I10 ESSENTIAL HYPERTENSION: ICD-10-CM

## 2025-04-29 DIAGNOSIS — E11.42 DIABETIC POLYNEUROPATHY ASSOCIATED WITH TYPE 2 DIABETES MELLITUS (HCC): ICD-10-CM

## 2025-04-29 DIAGNOSIS — Z00.00 MEDICARE ANNUAL WELLNESS VISIT, SUBSEQUENT: ICD-10-CM

## 2025-04-29 DIAGNOSIS — K21.9 GASTROESOPHAGEAL REFLUX DISEASE WITHOUT ESOPHAGITIS: ICD-10-CM

## 2025-04-29 PROCEDURE — 93000 ELECTROCARDIOGRAM COMPLETE: CPT | Performed by: FAMILY MEDICINE

## 2025-04-29 PROCEDURE — G0439 PPPS, SUBSEQ VISIT: HCPCS | Performed by: FAMILY MEDICINE

## 2025-04-29 PROCEDURE — 99214 OFFICE O/P EST MOD 30 MIN: CPT | Performed by: FAMILY MEDICINE

## 2025-04-29 PROCEDURE — G2211 COMPLEX E/M VISIT ADD ON: HCPCS | Performed by: FAMILY MEDICINE

## 2025-04-29 RX ORDER — FAMOTIDINE 40 MG/1
40 TABLET, FILM COATED ORAL 2 TIMES DAILY
Qty: 180 TABLET | Refills: 1 | Status: SHIPPED | OUTPATIENT
Start: 2025-04-29 | End: 2025-04-29

## 2025-04-29 RX ORDER — METFORMIN HYDROCHLORIDE 500 MG/1
500 TABLET, EXTENDED RELEASE ORAL
Qty: 90 TABLET | Refills: 1 | Status: SHIPPED | OUTPATIENT
Start: 2025-04-29

## 2025-04-29 RX ORDER — OMEPRAZOLE 40 MG/1
40 CAPSULE, DELAYED RELEASE ORAL DAILY
Qty: 90 CAPSULE | Refills: 3 | Status: SHIPPED | OUTPATIENT
Start: 2025-04-29

## 2025-04-29 RX ORDER — HYDROCHLOROTHIAZIDE 25 MG/1
12.5 TABLET ORAL DAILY
Start: 2025-04-29

## 2025-04-29 NOTE — PROGRESS NOTES
Diabetic Foot Exam    Patient's shoes and socks removed.    Right Foot/Ankle   Right Foot Inspection  Skin Exam: skin normal and skin intact. No dry skin, no warmth, no callus, no erythema, no maceration, no abnormal color, no pre-ulcer, no ulcer and no callus.     Toe Exam: ROM and strength within normal limits.     Sensory   Monofilament testing: intact    Vascular  The right DP pulse is 1+. The right PT pulse is 1+.     Left Foot/Ankle  Left Foot Inspection  Skin Exam: skin normal and skin intact. No dry skin, no warmth, no erythema, no maceration, normal color, no pre-ulcer, no ulcer and no callus.     Toe Exam: ROM and strength within normal limits.     Sensory   Monofilament testing: intact    Vascular  The left DP pulse is 1+. The left PT pulse is 1+.     Assign Risk Category  No deformity present  Loss of protective sensation  Weak pulses  Risk: 2  Name: Oma Cota      : 1945      MRN: [id number]  Encounter Provider: Ana Sommer DO  Encounter Date: 2025   Encounter department: [facility]  :  Begin metformin 500 mg and take it with your evening meal  Cut down on your bread and crackers and pretzels and chips and anything made with flour or sugar including sweets    Make sure you are walking every day to use up your sugar    You have your eye exam in May and let them know it is for diabetic eye exam exam and to send us the note    Go back on hydrochlorothiazide half pill daily    See you back in 3-1/2 months with a nonfasting A1c in 3 months  Assessment & Plan  Diabetic polyneuropathy associated with type 2 diabetes mellitus (HCC)  Doing better with alpha lipoic acid continue the same  Does seem to have a little bit more of a loss of sensation in his feet as result of the diabetes  Lab Results   Component Value Date    HGBA1C 7.3 (H) 2025            Essential hypertension  Blood pressure good 122/80  Please go back on the hydrochlorothiazide  half pill daily  EKG today  Orders:    POCT ECG    hydroCHLOROthiazide 25 mg tablet; Take 0.5 tablets (12.5 mg total) by mouth daily    Gastroesophageal reflux disease without esophagitis  Failed pantoprazole and failed famotidine twice a day  Back to omeprazole 40 mg once daily       Mixed hyperlipidemia  Continue simvastatin and diet  Check cholesterol values before next visit         Stage 3 chronic kidney disease, unspecified whether stage 3a or 3b CKD ([facility])  Lab Results   Component Value Date    EGFR 56 04/17/2025    EGFR 65 04/24/2024    EGFR 53 04/20/2023    CREATININE 1.22 04/17/2025    CREATININE 1.08 04/24/2024    CREATININE 1.29 04/20/2023   GFR staying stable continue lisinopril         Type 2 diabetes mellitus with microalbuminuria, without long-term current use of insulin ([facility])    Lab Results   Component Value Date    HGBA1C 7.3 (H) 04/17/2025   A1c 6.8 and now 7.3  Begin metformin 500 mg with evening meal  Recheck A1c in 3 months    Orders:    metFORMIN (GLUCOPHAGE-XR) 500 mg 24 hr tablet; Take 1 tablet (500 mg total) by mouth daily with dinner    Hemoglobin A1C; Future    Vitamin D deficiency  Vitamin D is good at 60  Continue vitamin D daily       Laryngopharyngeal reflux (LPR)  Failed pantoprazole and Pepcid  Continue omeprazole daily       Esophagitis    Orders:    omeprazole (PriLOSEC) 40 MG capsule; Take 1 capsule (40 mg total) by mouth daily    Medicare annual wellness visit, subsequent           Depression Screening and Follow-up Plan: Patient was screened for depression during today's encounter. They screened negative with a PHQ-2 score of 0.        Preventive health issues were discussed with patient, and age appropriate screening tests were ordered as noted in patient's After Visit Summary. Personalized health advice and appropriate referrals for health education or preventive services given if needed, as noted in patient's After Visit Summary.    History of Present Illness     Patient is  here for his AWV and his recheck and his diabetic foot exam and also go over blood work  Since last visit he is seeing Ortho regarding his knee and has not found a successful injection to help his pain  Followed with your knee nose and throat because of laryngeal reflux was put on Pepcid which did not work  Also on pantoprazole which did not work  Patient back on omeprazole once a day which does work       Patient Care Team:  Ana Sommer DO as PCP - General    Review of Systems  Negative x 12  Medical History Reviewed by provider this encounter:       Annual Wellness Visit Questionnaire   Byard is here for his Subsequent Wellness visit.     Health Risk Assessment:   Patient rates overall health as good. Patient feels that their physical health rating is same. Patient is very satisfied with their life. Eyesight was rated as same. Hearing was rated as same. Patients states they are never, rarely angry. Patient states they are often unusually tired/fatigued. Pain experienced in the last 7 days has been some. Patient's pain rating has been 9/10. Patient states that he has experienced no weight loss or gain in last 6 months. Right knee    Depression Screening:   PHQ-2 Score: 0      Fall Risk Screening:   In the past year, patient has experienced: no history of falling in past year      Home Safety:  Patient has trouble with stairs inside or outside of their home. Patient has working smoke alarms and has working carbon monoxide detector. Home safety hazards include: none. Knee pain     Nutrition:   Current diet is Regular.     Medications:   Patient is currently taking over-the-counter supplements. OTC medications include: see medication list. Patient is able to manage medications.     Activities of Daily Living (ADLs)/Instrumental Activities of Daily Living (IADLs):   Walk and transfer into and out of bed and chair?: Yes  Dress and groom yourself?: Yes    Bathe or shower yourself?: Yes    Feed yourself? Yes  Do your  laundry/housekeeping?: Yes  Manage your money, pay your bills and track your expenses?: Yes  Make your own meals?: Yes    Do your own shopping?: Yes    Previous Hospitalizations:   Any hospitalizations or ED visits within the last 12 months?: No      Advance Care Planning:   Living will: Yes    Advanced directive: Yes    End of Life Decisions reviewed with patient: Yes      Cognitive Screening:   Provider or family/friend/caregiver concerned regarding cognition?: No    Preventive Screenings      Cardiovascular Screening:    General: Screening Not Indicated and History Lipid Disorder      Diabetes Screening:     General: Screening Not Indicated and History Diabetes      Colorectal Cancer Screening:     General: Screening Current      Prostate Cancer Screening:    General: Screening Not Indicated      Osteoporosis Screening:    General: Screening Not Indicated      Abdominal Aortic Aneurysm (AAA) Screening:    Risk factors include: tobacco use        General: Screening Not Indicated      Lung Cancer Screening:     General: Screening Not Indicated      Hepatitis C Screening:    General: Screening Current    Screening, Brief Intervention, and Referral to Treatment (SBIRT)     Screening  Typical number of drinks in a day: 0  Typical number of drinks in a week: 0  Interpretation: Low risk drinking behavior.    Single Item Drug Screening:  How often have you used an illegal drug (including marijuana) or a prescription medication for non-medical reasons in the past year? never    Single Item Drug Screen Score: 0  Interpretation: Negative screen for possible drug use disorder    Brief Intervention  Alcohol & drug use screenings were reviewed. No concerns regarding substance use disorder identified.     Other Counseling Topics:   Regular weightbearing exercise.     Social Drivers of Health     Financial Resource Strain: Patient Declined (4/16/2023)    Overall Financial Resource Strain (CARDIA)     Difficulty of Paying Living  Expenses: Patient declined   Food Insecurity: No Food Insecurity (4/29/2025)    Hunger Vital Sign     Worried About Running Out of Food in the Last Year: Never true     Ran Out of Food in the Last Year: Never true   Transportation Needs: No Transportation Needs (4/29/2025)    PRAPARE - Transportation     Lack of Transportation (Medical): No     Lack of Transportation (Non-Medical): No   Housing Stability: Low Risk  (4/29/2025)    Housing Stability Vital Sign     Unable to Pay for Housing in the Last Year: No     Number of Times Moved in the Last Year: 1     Homeless in the Last Year: No   Utilities: Not At Risk (4/29/2025)    [facility] Utilities     Threatened with loss of utilities: No     No results found.    Objective   There were no vitals taken for this visit.    Physical Exam  Cardiovascular:      Pulses: Pulses are weak.           Dorsalis pedis pulses are 1+ on the right side and 1+ on the left side.        Posterior tibial pulses are 1+ on the right side and 1+ on the left side.   Feet:      Right foot:      Skin integrity: No ulcer, skin breakdown, erythema, warmth, callus or dry skin.      Left foot:      Skin integrity: No ulcer, skin breakdown, erythema, warmth, callus or dry skin.     Constitutional  Appears healthy, Looks well, Appearance consistent with age    Mental Status  Alert, Oriented, Cooperative, Memory function normal , clean, and reasonable    Neck  No neck mass, No thyromegaly, Good carotid upstrokes bilaterally, trachea midline positive click    Respiratory  Breath sounds normal, No rales, No rhonchi, No wheezing, normal palpation    Cardiac  Regular rhythm without ectopy or murmur no S3-S4, no heave lift or thrill to palpation    Vascular  No leg edema, No pedal edema    Muscular skeletal  No clubbing cyanosis , muscle tone normal    Skin  No appreciable rashes or abnormal appearing lesions

## 2025-04-29 NOTE — ASSESSMENT & PLAN NOTE
Blood pressure good 122/80  Please go back on the hydrochlorothiazide half pill daily  EKG today  Orders:    POCT ECG    hydroCHLOROthiazide 25 mg tablet; Take 0.5 tablets (12.5 mg total) by mouth daily

## 2025-04-29 NOTE — ASSESSMENT & PLAN NOTE
Lab Results   Component Value Date    HGBA1C 7.3 (H) 04/17/2025   A1c 6.8 and now 7.3  Begin metformin 500 mg with evening meal  Recheck A1c in 3 months    Orders:    metFORMIN (GLUCOPHAGE-XR) 500 mg 24 hr tablet; Take 1 tablet (500 mg total) by mouth daily with dinner    Hemoglobin A1C; Future

## 2025-04-29 NOTE — ASSESSMENT & PLAN NOTE
Lab Results   Component Value Date    EGFR 56 04/17/2025    EGFR 65 04/24/2024    EGFR 53 04/20/2023    CREATININE 1.22 04/17/2025    CREATININE 1.08 04/24/2024    CREATININE 1.29 04/20/2023   GFR staying stable continue lisinopril

## 2025-04-29 NOTE — PATIENT INSTRUCTIONS
Begin metformin 500 mg and take it with your evening meal  Cut down on your bread and crackers and pretzels and chips and anything made with flour or sugar including sweets    Make sure you are walking every day to use up your sugar    You have your eye exam in May and let them know it is for diabetic eye exam exam and to send us the note    Go back on hydrochlorothiazide half pill daily    See you back in 3-1/2 months with a nonfasting A1c in 3 months    Medicare Preventive Visit Patient Instructions  Thank you for completing your Welcome to Medicare Visit or Medicare Annual Wellness Visit today. Your next wellness visit will be due in one year (4/30/2026).  The screening/preventive services that you may require over the next 5-10 years are detailed below. Some tests may not apply to you based off risk factors and/or age. Screening tests ordered at today's visit but not completed yet may show as past due. Also, please note that scanned in results may not display below.  Preventive Screenings:  Service Recommendations Previous Testing/Comments   Colorectal Cancer Screening  Colonoscopy    Fecal Occult Blood Test (FOBT)/Fecal Immunochemical Test (FIT)  Fecal DNA/Cologuard Test  Flexible Sigmoidoscopy Age: 45-75 years old   Colonoscopy: every 10 years (May be performed more frequently if at higher risk)  OR  FOBT/FIT: every 1 year  OR  Cologuard: every 3 years  OR  Sigmoidoscopy: every 5 years  Screening may be recommended earlier than age 45 if at higher risk for colorectal cancer. Also, an individualized decision between you and your healthcare provider will decide whether screening between the ages of 76-85 would be appropriate. Colonoscopy: 02/27/2024  FOBT/FIT: Not on file  Cologuard: Not on file  Sigmoidoscopy: Not on file    Screening Current     Prostate Cancer Screening Individualized decision between patient and health care provider in men between ages of 55-69   Medicare will cover every 12 months beginning  on the day after your 50th birthday PSA: 1.3 ng/mL     Screening Not Indicated     Hepatitis C Screening Once for adults born between 1945 and 1965  More frequently in patients at high risk for Hepatitis C Hep C Antibody: Not on file    Screening Current   Diabetes Screening 1-2 times per year if you're at risk for diabetes or have pre-diabetes Fasting glucose: 154 mg/dL (4/17/2025)  A1C: 7.3 % (4/17/2025)  Screening Not Indicated  History Diabetes   Cholesterol Screening Once every 5 years if you don't have a lipid disorder. May order more often based on risk factors. Lipid panel: 04/17/2025  Screening Not Indicated  History Lipid Disorder      Other Preventive Screenings Covered by Medicare:  Abdominal Aortic Aneurysm (AAA) Screening: covered once if your at risk. You're considered to be at risk if you have a family history of AAA or a male between the age of 65-75 who smoking at least 100 cigarettes in your lifetime.  Lung Cancer Screening: covers low dose CT scan once per year if you meet all of the following conditions: (1) Age 55-77; (2) No signs or symptoms of lung cancer; (3) Current smoker or have quit smoking within the last 15 years; (4) You have a tobacco smoking history of at least 20 pack years (packs per day x number of years you smoked); (5) You get a written order from a healthcare provider.  Glaucoma Screening: covered annually if you're considered high risk: (1) You have diabetes OR (2) Family history of glaucoma OR (3)  aged 50 and older OR (4)  American aged 65 and older  Osteoporosis Screening: covered every 2 years if you meet one of the following conditions: (1) Have a vertebral abnormality; (2) On glucocorticoid therapy for more than 3 months; (3) Have primary hyperparathyroidism; (4) On osteoporosis medications and need to assess response to drug therapy.  HIV Screening: covered annually if you're between the age of 15-65. Also covered annually if you are younger  than 15 and older than 65 with risk factors for HIV infection. For pregnant patients, it is covered up to 3 times per pregnancy.    Immunizations:  Immunization Recommendations   Influenza Vaccine Annual influenza vaccination during flu season is recommended for all persons aged >= 6 months who do not have contraindications   Pneumococcal Vaccine   * Pneumococcal conjugate vaccine = PCV13 (Prevnar 13), PCV15 (Vaxneuvance), PCV20 (Prevnar 20)  * Pneumococcal polysaccharide vaccine = PPSV23 (Pneumovax) Adults 19-65 yo with certain risk factors or if 65+ yo  If never received any pneumonia vaccine: recommend Prevnar 20 (PCV20)  Give PCV20 if previously received 1 dose of PCV13 or PPSV23   Hepatitis B Vaccine 3 dose series if at intermediate or high risk (ex: diabetes, end stage renal disease, liver disease)   Respiratory syncytial virus (RSV) Vaccine - COVERED BY MEDICARE PART D  * RSVPreF3 (Arexvy) CDC recommends that adults 60 years of age and older may receive a single dose of RSV vaccine using shared clinical decision-making (SCDM)   Tetanus (Td) Vaccine - COST NOT COVERED BY MEDICARE PART B Following completion of primary series, a booster dose should be given every 10 years to maintain immunity against tetanus. Td may also be given as tetanus wound prophylaxis.   Tdap Vaccine - COST NOT COVERED BY MEDICARE PART B Recommended at least once for all adults. For pregnant patients, recommended with each pregnancy.   Shingles Vaccine (Shingrix) - COST NOT COVERED BY MEDICARE PART B  2 shot series recommended in those 19 years and older who have or will have weakened immune systems or those 50 years and older     Health Maintenance Due:      Topic Date Due    Hepatitis C Screening  05/23/2026 (Originally 1945)    Colorectal Cancer Screening  02/25/2029     Immunizations Due:      Topic Date Due    IPV Vaccine (2 of 3 - Adult catch-up series) 09/25/2006    COVID-19 Vaccine (6 - 2024-25 season) 04/28/2025     Advance  Directives   What are advance directives?  Advance directives are legal documents that state your wishes and plans for medical care. These plans are made ahead of time in case you lose your ability to make decisions for yourself. Advance directives can apply to any medical decision, such as the treatments you want, and if you want to donate organs.   What are the types of advance directives?  There are many types of advance directives, and each state has rules about how to use them. You may choose a combination of any of the following:  Living will:  This is a written record of the treatment you want. You can also choose which treatments you do not want, which to limit, and which to stop at a certain time. This includes surgery, medicine, IV fluid, and tube feedings.   Durable power of  for healthcare (DPAHC):  This is a written record that states who you want to make healthcare choices for you when you are unable to make them for yourself. This person, called a proxy, is usually a family member or a friend. You may choose more than 1 proxy.  Do not resuscitate (DNR) order:  A DNR order is used in case your heart stops beating or you stop breathing. It is a request not to have certain forms of treatment, such as CPR. A DNR order may be included in other types of advance directives.  Medical directive:  This covers the care that you want if you are in a coma, near death, or unable to make decisions for yourself. You can list the treatments you want for each condition. Treatment may include pain medicine, surgery, blood transfusions, dialysis, IV or tube feedings, and a ventilator (breathing machine).  Values history:  This document has questions about your views, beliefs, and how you feel and think about life. This information can help others choose the care that you would choose.  Why are advance directives important?  An advance directive helps you control your care. Although spoken wishes may be used, it  is better to have your wishes written down. Spoken wishes can be misunderstood, or not followed. Treatments may be given even if you do not want them. An advance directive may make it easier for your family to make difficult choices about your care.   Weight Management   Why it is important to manage your weight:  Being overweight increases your risk of health conditions such as heart disease, high blood pressure, type 2 diabetes, and certain types of cancer. It can also increase your risk for osteoarthritis, sleep apnea, and other respiratory problems. Aim for a slow, steady weight loss. Even a small amount of weight loss can lower your risk of health problems.  How to lose weight safely:  A safe and healthy way to lose weight is to eat fewer calories and get regular exercise. You can lose up about 1 pound a week by decreasing the number of calories you eat by 500 calories each day.   Healthy meal plan for weight management:  A healthy meal plan includes a variety of foods, contains fewer calories, and helps you stay healthy. A healthy meal plan includes the following:  Eat whole-grain foods more often.  A healthy meal plan should contain fiber. Fiber is the part of grains, fruits, and vegetables that is not broken down by your body. Whole-grain foods are healthy and provide extra fiber in your diet. Some examples of whole-grain foods are whole-wheat breads and pastas, oatmeal, brown rice, and bulgur.  Eat a variety of vegetables every day.  Include dark, leafy greens such as spinach, kale, tayla greens, and mustard greens. Eat yellow and orange vegetables such as carrots, sweet potatoes, and winter squash.   Eat a variety of fruits every day.  Choose fresh or canned fruit (canned in its own juice or light syrup) instead of juice. Fruit juice has very little or no fiber.  Eat low-fat dairy foods.  Drink fat-free (skim) milk or 1% milk. Eat fat-free yogurt and low-fat cottage cheese. Try low-fat cheeses such as  mozzarella and other reduced-fat cheeses.  Choose meat and other protein foods that are low in fat.  Choose beans or other legumes such as split peas or lentils. Choose fish, skinless poultry (chicken or turkey), or lean cuts of red meat (beef or pork). Before you cook meat or poultry, cut off any visible fat.   Use less fat and oil.  Try baking foods instead of frying them. Add less fat, such as margarine, sour cream, regular salad dressing and mayonnaise to foods. Eat fewer high-fat foods. Some examples of high-fat foods include french fries, doughnuts, ice cream, and cakes.  Eat fewer sweets.  Limit foods and drinks that are high in sugar. This includes candy, cookies, regular soda, and sweetened drinks.  Exercise:  Exercise at least 30 minutes per day on most days of the week. Some examples of exercise include walking, biking, dancing, and swimming. You can also fit in more physical activity by taking the stairs instead of the elevator or parking farther away from stores. Ask your healthcare provider about the best exercise plan for you.      © Copyright [employer] 2018 Information is for End User's use only and may not be sold, redistributed or otherwise used for commercial purposes. All illustrations and images included in CareNotes® are the copyrighted property of [employer] or [employer]

## 2025-04-29 NOTE — ASSESSMENT & PLAN NOTE
Doing better with alpha lipoic acid continue the same  Does seem to have a little bit more of a loss of sensation in his feet as result of the diabetes  Lab Results   Component Value Date    HGBA1C 7.3 (H) 04/17/2025

## 2025-06-20 ENCOUNTER — PROCEDURE VISIT (OUTPATIENT)
Dept: OBGYN CLINIC | Facility: CLINIC | Age: 80
End: 2025-06-20
Payer: MEDICARE

## 2025-06-20 VITALS — HEIGHT: 66 IN | BODY MASS INDEX: 29.73 KG/M2 | WEIGHT: 185 LBS

## 2025-06-20 DIAGNOSIS — M17.11 PRIMARY OSTEOARTHRITIS OF RIGHT KNEE: Primary | ICD-10-CM

## 2025-06-20 PROCEDURE — 20610 DRAIN/INJ JOINT/BURSA W/O US: CPT | Performed by: PHYSICIAN ASSISTANT

## 2025-06-20 NOTE — PROGRESS NOTES
"Name: Oma oCta      : 1945      MRN: [id number]  Encounter Provider: Theresa Hernandez MD  Encounter Date: 2025   Encounter department: [facility]  :  Assessment & Plan  Primary osteoarthritis of right knee  Weightbearing as tolerated right lower extremity  Right knee intra-articular Durolane injection administered as noted above  Advised no significant activity or increased ambulation over the next 24-48 hours and warm compresses to the knee no greater 20 minutes 3-4 times 24 hours following the injection.  Patient advised should they develop any increasing pain, redness, drainage, numbness, tingling or swelling surrounding the injection sight, they are to contact our office or present to the emergency department.  Follow-up in 3 months time advised patient should he fail conservative management be candidate for total knee arthroplasty  Orders:    Large joint arthrocentesis: R knee        History of Present Illness   HPI  Oma Cota is a 79 y.o. male who presents to the office today regarding right knee pain due to arthritis.  Patient is had multiple intra-articular corticosteroid injections.  Patient states the injections initially helped however the pain has been worsening.  Pain limits his activities of daily living rates the pain in her from a 2-5 out of 10 is worse activity worse weightbearing mildly relieved with rest.  States the pain is localized to his right knee.  States anti-inflammatory medication, minimal pain relief     Objective   Ht 5' 6\" (1.676 m)   Wt 83.9 kg (185 lb)   BMI 29.86 kg/m²                    Review Of Systems:   Skin: Normal  Neuro: See HPI  Musculoskeletal: See HPI  All other systems reviewed and are negative    Past Medical History:   Past Medical History[1]    Past Surgical History:   Past Surgical History[2]    Family History:  Family history reviewed and non-contributory  Family History[3]      Social History:  Social " History[4]    Allergies:   Allergies[5]    Labs:  0   Lab Value Date/Time    HCT 40.4 04/17/2025 1021    HCT 42.3 04/24/2024 0753    HCT 43.5 04/20/2023 1015    HCT 40.2 08/10/2015 0946    HCT 41.2 07/02/2014 0805    HGB 13.1 04/17/2025 1021    HGB 13.5 04/24/2024 0753    HGB 14.3 04/20/2023 1015    HGB 14.0 08/10/2015 0946    HGB 14.3 07/02/2014 0805    WBC 10.34 (H) 04/17/2025 1021    WBC 12.47 (H) 04/24/2024 0753    WBC 8.79 04/20/2023 1015    WBC 7.24 08/10/2015 0946    WBC 6.44 07/02/2014 0805       Meds:  Current Medications[6]    Body mass index is 29.86 kg/m².  Wt Readings from Last 3 Encounters:   06/20/25 83.9 kg (185 lb)   04/29/25 84.1 kg (185 lb 6.4 oz)   03/07/25 84.8 kg (187 lb)     Physical Exam:   There were no vitals filed for this visit.    General Appearance:    Alert, cooperative, no distress, appears stated age   Head:    Normocephalic, without obvious abnormality, atraumatic   Eyes:    conjunctiva/corneas clear, both eyes        Nose:   Nares normal, septum midline, no drainage    Throat:   Lips normal; teeth and gums normal   Neck:    symmetrical, trachea midline, ;     thyroid:  no enlargement/   Back:     Symmetric, no curvature, ROM normal   Lungs:   No audible wheezing or labored breathing   Chest Wall:    No tenderness or deformity    Heart:    Regular rate and rhythm               Pulses:   2+ and symmetric all extremities   Skin:   Skin color, texture, turgor normal, no rashes or lesions   Neurologic:   normal strength, sensation and reflexes     throughout       Musculoskeletal: right lower extremity  On examination of the right knee there is no effusion, no erythema.  Range of motion to full active extension and flexion to greater than 120°.  Pain on palpation medial and lateral joint lines.  There is crepitus with range of motion, no warmth to palpation, bony enlargement noted. No pain on palpation pes anserine bursa region or distal iliotibial band.  Stable to varus and valgus  stress without pain or gapping.  Negative anterior and posterior drawer testing.  Sensation intact distal pulses present.        _*_*_*_*_*_*_*_*_*_*_*_*_*_*_*_*_*_*_*_*_*_*_*_*_*_*_*_*_*_*_*_*_*_*_*_*_*_*_*_*_*            Large joint arthrocentesis: R knee    Performed by: Mikey Andrade PA-C  Authorized by: Mikey Andrade PA-C    [place] Protocol:  Consent: Verbal consent obtained  Consent given by: patient  Patient understanding: patient states understanding of the procedure being performed  Site marked: the operative site was marked  Patient identity confirmed: verbally with patient  Supporting Documentation  Indications: pain     Is this a Visco injection? 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    unspecified laterality    Colon polyp     Diabetes mellitus (HCC)     Diverticulitis of colon 02/22/2011    Ganglion     last assessed [id number]    Gastroenteritis     last assessed [id number]    Glucose intolerance     last assessed [id number]    Hyperesthesia 11/30/2010    Hyperlipidemia     Hypertension     Rectal hemorrhage 06/01/2010    Tongue pain 04/23/2024    Trigger middle finger of right hand 05/19/2017   [2]   Past Surgical History:  Procedure Laterality Date    ADENOIDECTOMY  01/01/1950    1950    INCISIONAL BREAST BIOPSY Left 01/01/1980    L breast did bx 1980    NECK SURGERY  01/01/1950    neck gland removed as child 1950     TONSILLECTOMY  1950    1950   [3]   Family History  Problem Relation Name Age of Onset    Alzheimer's disease Mother      Heart attack Father      Alcohol abuse Neg Hx      Substance Abuse Neg Hx     [4]   Social History  Socioeconomic History    Marital status: /Civil Union   Tobacco Use    Smoking status: Former     Current packs/day: 0.00     Types: Cigarettes     Quit date:      Years since quittin.4    Smokeless tobacco: Never    Tobacco comments:     quit in [id number]   Vaping Use    Vaping status: Never Used   Substance and Sexual Activity    Alcohol use: Yes     Comment: social    Drug use: No   Social History Narrative    Active directive in chart    Always uses seatbelt    Drinks caffeinated tea, 4-6 cups hot tea/day     Social Drivers of Health     Financial Resource Strain: Patient Declined (2023)    Overall Financial Resource Strain (CARDIA)     Difficulty of Paying Living Expenses: Patient declined   Food Insecurity: No Food Insecurity (2025)    Nursing - Inadequate Food Risk Classification     Worried About Running Out of Food in the Last Year: Never true     Ran Out of Food in the Last Year: Never true   Transportation Needs: No Transportation Needs (2025)    PRAPARE - Transportation     Lack of Transportation (Medical): No     Lack of Transportation (Non-Medical): No   Housing Stability: Low Risk  (2025)    Housing Stability Vital Sign     Unable to Pay for Housing in the Last Year: No     Number of Times Moved in the Last Year: 1     Homeless in the Last Year: No   [5]   Allergies  Allergen Reactions    Other Allergic Rhinitis     Seasoal    Iodine - Food Allergy      Other reaction(s): was told by nurse: heat rash?   [6]   Current Outpatient Medications:     Alpha-Lipoic Acid 600 MG CAPS, Take by mouth, Disp: , Rfl:     aspirin 81 mg chewable tablet, Chew 1 tablet daily, Disp: , Rfl:     Cholecalciferol (VITAMIN D3) 400 units CAPS, Take by mouth, Disp:  , Rfl:     glucose blood (Randy Contour Next Test) test strip, 1 each by Other route as needed (Use once daily), Disp: 100 each, Rfl: 3    hydroCHLOROthiazide 25 mg tablet, Take 0.5 tablets (12.5 mg total) by mouth daily, Disp: , Rfl:     lisinopril (ZESTRIL) 40 mg tablet, TAKE 1 TABLET BY MOUTH DAILY, Disp: 90 tablet, Rfl: 1    metFORMIN (GLUCOPHAGE-XR) 500 mg 24 hr tablet, Take 1 tablet (500 mg total) by mouth daily with dinner, Disp: 90 tablet, Rfl: 1    MICROLET LANCETS MISC, Test sugars twice a day, Disp: 100 each, Rfl: 3    multivitamin-minerals (CENTRUM ADULTS) tablet, Take 1 tablet by mouth daily, Disp: , Rfl:     ofloxacin (OCUFLOX) 0.3 % ophthalmic solution, INSTILL 1 DROP TO OPERATIVE EYE 4 TIMES A DAY AS DIRECTED, Disp: , Rfl:     omeprazole (PriLOSEC) 40 MG capsule, Take 1 capsule (40 mg total) by mouth daily, Disp: 90 capsule, Rfl: 3    prednisoLONE acetate (PRED FORTE) 1 % ophthalmic suspension, INSTILL 1 DROP TO OPERATIVE EYE 4 TIMES A DAY AS DIRECTED, Disp: , Rfl:     simvastatin (ZOCOR) 5 MG tablet, Take 1 tablet (5 mg total) by mouth daily at bedtime, Disp: 90 tablet, Rfl: 3

## 2025-06-20 NOTE — ASSESSMENT & PLAN NOTE
Weightbearing as tolerated right lower extremity  Right knee intra-articular Durolane injection administered as noted above  Advised no significant activity or increased ambulation over the next 24-48 hours and warm compresses to the knee no greater 20 minutes 3-4 times 24 hours following the injection.  Patient advised should they develop any increasing pain, redness, drainage, numbness, tingling or swelling surrounding the injection sight, they are to contact our office or present to the emergency department.  Follow-up in 3 months time advised patient should he fail conservative management be candidate for total knee arthroplasty  Orders:    Large joint arthrocentesis: R knee

## 2025-07-01 DIAGNOSIS — I10 ESSENTIAL HYPERTENSION: ICD-10-CM

## 2025-07-02 RX ORDER — LISINOPRIL 40 MG/1
40 TABLET ORAL DAILY
Qty: 90 TABLET | Refills: 3 | Status: SHIPPED | OUTPATIENT
Start: 2025-07-02

## 2025-07-30 ENCOUNTER — APPOINTMENT (OUTPATIENT)
Dept: LAB | Facility: HOSPITAL | Age: 80
End: 2025-07-30
Payer: MEDICARE

## 2025-07-30 DIAGNOSIS — E11.29 TYPE 2 DIABETES MELLITUS WITH MICROALBUMINURIA, WITHOUT LONG-TERM CURRENT USE OF INSULIN (HCC): ICD-10-CM

## 2025-07-30 DIAGNOSIS — R80.9 TYPE 2 DIABETES MELLITUS WITH MICROALBUMINURIA, WITHOUT LONG-TERM CURRENT USE OF INSULIN (HCC): ICD-10-CM

## 2025-07-30 LAB
EST. AVERAGE GLUCOSE BLD GHB EST-MCNC: 140 MG/DL
HBA1C MFR BLD: 6.5 %

## 2025-07-30 PROCEDURE — 83036 HEMOGLOBIN GLYCOSYLATED A1C: CPT

## 2025-07-30 PROCEDURE — 36415 COLL VENOUS BLD VENIPUNCTURE: CPT

## 2025-08-12 ENCOUNTER — OFFICE VISIT (OUTPATIENT)
Dept: FAMILY MEDICINE CLINIC | Facility: CLINIC | Age: 80
End: 2025-08-12
Payer: MEDICARE

## 2025-08-13 ENCOUNTER — TELEPHONE (OUTPATIENT)
Dept: ADMINISTRATIVE | Facility: OTHER | Age: 80
End: 2025-08-13